# Patient Record
Sex: MALE | Race: WHITE | Employment: OTHER | URBAN - NONMETROPOLITAN AREA
[De-identification: names, ages, dates, MRNs, and addresses within clinical notes are randomized per-mention and may not be internally consistent; named-entity substitution may affect disease eponyms.]

---

## 2020-10-12 ENCOUNTER — HOSPITAL ENCOUNTER (OUTPATIENT)
Dept: MRI IMAGING | Age: 50
Discharge: HOME | End: 2020-10-12

## 2020-10-12 ENCOUNTER — HOSPITAL ENCOUNTER (OUTPATIENT)
Dept: GENERAL RADIOLOGY | Age: 50
Discharge: HOME | End: 2020-10-12

## 2020-10-19 ENCOUNTER — OFFICE VISIT (OUTPATIENT)
Dept: PHYSICAL MEDICINE AND REHAB | Age: 50
End: 2020-10-19
Payer: MEDICAID

## 2020-10-19 VITALS
WEIGHT: 169.5 LBS | SYSTOLIC BLOOD PRESSURE: 130 MMHG | BODY MASS INDEX: 23.73 KG/M2 | DIASTOLIC BLOOD PRESSURE: 78 MMHG | HEIGHT: 71 IN

## 2020-10-19 PROCEDURE — 99244 OFF/OP CNSLTJ NEW/EST MOD 40: CPT | Performed by: NURSE PRACTITIONER

## 2020-10-19 RX ORDER — BACLOFEN 10 MG/1
10 TABLET ORAL 3 TIMES DAILY
COMMUNITY
Start: 2020-09-17 | End: 2021-10-20

## 2020-10-19 RX ORDER — ZOLPIDEM TARTRATE 5 MG/1
5 TABLET ORAL NIGHTLY
Status: ON HOLD | COMMUNITY
Start: 2020-10-06 | End: 2021-05-26

## 2020-10-19 RX ORDER — ATORVASTATIN CALCIUM 20 MG/1
TABLET, FILM COATED ORAL
Status: ON HOLD | COMMUNITY
Start: 2020-09-29 | End: 2021-03-26

## 2020-10-19 RX ORDER — ETODOLAC 400 MG/1
400 TABLET, FILM COATED ORAL 3 TIMES DAILY
COMMUNITY
Start: 2020-09-17 | End: 2020-12-10

## 2020-10-19 RX ORDER — TAMSULOSIN HYDROCHLORIDE 0.4 MG/1
0.4 CAPSULE ORAL 2 TIMES DAILY
COMMUNITY
Start: 2020-08-20 | End: 2022-05-05 | Stop reason: ALTCHOICE

## 2020-10-19 ASSESSMENT — ENCOUNTER SYMPTOMS
EYE ITCHING: 0
APNEA: 1
VOMITING: 0
CHEST TIGHTNESS: 0
NAUSEA: 0
COUGH: 0
BACK PAIN: 1
EYE REDNESS: 0
SINUS PRESSURE: 0
SHORTNESS OF BREATH: 0
RHINORRHEA: 0
ABDOMINAL PAIN: 0
CONSTIPATION: 0
COLOR CHANGE: 0
DIARRHEA: 0
SINUS PAIN: 0
EYE PAIN: 0
SORE THROAT: 0

## 2020-10-19 NOTE — PROGRESS NOTES
[facility]  [address]  Dept: [phone]  Dept Fax: [phone]: [phone]    Visit Date: 10/19/2020    Maynor Bruce is a 45 y.o. male who is referred for pain management evaluation and treatment per Dr. Jadiel Rosen. CAGE and CAGE-AID Questions   1. In the last three months, have you felt you should cut down or stop drinking or using drugs? Yes []        No [x]     2. In the last three months, has anyone annoyed you or gotten on your nerves by telling you to cut down or stop drinking or using drugs? Yes []        No [x]     3. In the last three months, have you felt guilty or bad about how much you drink or use drugs? Yes []        No [x]     4. In the last three months, have you been waking up wanting to have an alcoholic drink or use drugs? Yes []        No [x]        Opioid Risk Tool:  Clinician Form       1. Family History of Substance Abuse: Female Male    Alcohol   []1   []3    Illegal drugs   []2   [x]3    Prescription drugs     []4   []4   2. Personal History of Substance Abuse:          Alcohol   []3   []3    Illegal drugs   []4   [x]4    Prescription drugs     []5   []5   3. Age (haley box if between 12 and 39):     []1   []1   4. History of Preadolescent Sexual Abuse:     []3   []0   5. Psychological Disease:      Attention deficit disorder, obsessive-compulsive disorder, bipolar, schizophrenia   []2   []2      Depression, Intermittent explosive disorder     []1   [x]1    Scoring Totals  8     Total Score  Low Risk  Moderate Risk  High Risk   Risk Category   0 - 3   4 - 7   8 or Above      Patient states symptoms interfere with:  A.  General Activity:  yes   B. Mood: yes    C. Walking Ability:   yes   D. Normal Work (Includes both work outside the home and housework):   yes    E.  Relations with Other People:  yes   F. Sleep:   yes   G.  Enjoyment of Life:  yes HPI:     ChiefComplaint: Low back pain, Right leg pain and Left leg pain    HPI    Patient here today for new patient evaluation for low back and leg pain. Patient is ACTIVE with Dr Consuelo Gross, pain management, in [address]. Patient with one visit to PT, 9/15/2020, which made his pain worse. Patient currently on baclofen and etodolac from Dr Consuelo Gross. Xr and MRI reviewed from [facility]. Patient is to have steroid injection tomorrow but is unsure what it is. Patient doesn't feel that he is being listened to as he has had injections in the past and they haven't worked. Patient has seen Dr Mariana Palencia, neurosurgery at [facility], and is set up for some testing for his back next week. Patient with previous pain management with a Dr Sreekanth Crisostomo in [place], [place] with procedures at [facility]. Will request these records also. Patient with previous CVA in 2003, patient states memory issues were progressively worse after the CVA, but did do drugs when he was younger and unsure if that may contribute also. Patient presents for evaluation of low back and leg pain. Symptoms have been present for 30 years. Patient reports inury at 22years old with a fork lift bruce to the back. Patient was incarcerated at the time and on work release and was not taken to receive care. Patient describes symptoms as pain in low back and posterior legs (aching, sharp, shooting and bilateral leg cramping in character; 9/10 in severity). Pain at best is 3-4 out of 10. Symptoms are worst: activity. Alleviating factors identifiable by patient are changing positions. Exacerbating factors identifiable by patient are bending forwards, standing, walking and one position for too long. Patient states that that th feeling he gets in the low back is like when you touch a battery to your tongue. Saw Dr Anastasia Turk maybe 30 years ago for his back.   Treatments so far initiated by patient include injections, TENS, baclofen, menthol cream, Physical therapy, ice, heat, home exercises, NSAIDs and tylenol. Treatments that were helpful were short term releiv with : Medical marijuana, heat, NSAIDs and tylenol. Last THC use was a couple weeks ago. EXAM: MRI LUMBAR SPINE WITHOUT CONTRAST    HISTORY: Back pain, > 6wks conservative tx, persistent-progressive sx, surgical candidate; ;COMMENT: History of annular tear on previous MRI. COMPARISON: Lumbar spine x-ray from 6/5/2020. No previous MRIs available. .    TECHNIQUE: MRI of the lumbar spine without contrast was performed. FINDINGS:    Number of non-rib bearing lumbar vertebral bodies: 5. Alignment: Normal lordosis. Soft tissues/paraspinal muscles: No acute abnormalities. Conus/cauda equina: Normal conus. No clumping of cauda equina nerve roots. Vertebrae/bones: No fractures. Degenerative changes:  L1-L2, L2-L3 and L3-L4: Normal disc and facets. No spinal canal or foraminal stenosis. L4-L5: Small symmetric 2 mm AP dimension disc bulge and mild facet arthropathy with trace facet effusions. No spinal canal stenosis. Mild bilateral foraminal stenosis. L5-S1: Mild disc degeneration and small 3 mm AP dimension asymmetric right disc bulge. The disc contacts the bilateral traversing S1 nerves, slightly more than the right than the left where there may be slight compression of the traversing right S1   nerve. Mild facet arthropathy. No foraminal stenosis. IMPRESSION  IMPRESSION:  1. Mild degenerative changes in the lower lumbar spine. 2. An asymmetric right disc bulge at L5-S1 contacts the bilateral traversing S1 nerves, more so on the right than the left. EMG with Dr Iona Rose 2/11/2020  EMG of bilateral lower extremities and nerve conduction studies of 6 nerves were performed. ICD-10-CM   1. Chronic bilateral low back pain with bilateral sciatica  Please refer to the EMG/NCS report for details of this study.    The study of bilateral lower extremities was normal. There was no evidence of focal neuropathy, radiculopathy or myopathy. There were prominent pain behaviors. Mechanical pain would be high in the differential.         The patient has No Known Allergies. PastMedical History  Karina Manriquez  has no past medical history on file. Past Surgical History  The patient  has no past surgical history on file. Family History  This patient's family history is not on file. Social History  Karina Manriquez  reports that he has been smoking. He has never used smokeless tobacco. He reports current alcohol use. Medications    Current Outpatient Medications:     baclofen (LIORESAL) 10 MG tablet, Take 10 mg by mouth 3 times daily, Disp: , Rfl:     etodolac (LODINE) 400 MG tablet, Take 400 mg by mouth 3 times daily, Disp: , Rfl:     tamsulosin (FLOMAX) 0.4 MG capsule, Take 0.4 mg by mouth daily, Disp: , Rfl:     zolpidem (AMBIEN) 5 MG tablet, Take 5 mg by mouth once., Disp: , Rfl:     atorvastatin (LIPITOR) 20 MG tablet, , Disp: , Rfl:     Subjective:      Review of Systems   Constitutional: Positive for activity change and fatigue. Negative for chills, diaphoresis and fever. HENT: Negative for congestion, ear discharge, ear pain, mouth sores, nosebleeds, postnasal drip, rhinorrhea, sinus pressure, sinus pain and sore throat. Eyes: Negative for pain, redness and itching. Respiratory: Positive for apnea (getting work up). Negative for cough, chest tightness and shortness of breath. Cardiovascular: Positive for chest pain. Negative for palpitations and leg swelling. Gastrointestinal: Negative for abdominal pain, constipation, diarrhea, nausea and vomiting. Endocrine: Negative for cold intolerance and heat intolerance. Genitourinary: Positive for difficulty urinating and urgency. Negative for frequency. Musculoskeletal: Positive for arthralgias, back pain and myalgias. Negative for gait problem, neck pain and neck stiffness.    Skin: Positive for wound (small scab on right forearm). Negative for color change and rash. Allergic/Immunologic: Negative for environmental allergies and food allergies. Neurological: Positive for seizures (was told in grade school ), light-headedness and numbness. Negative for dizziness and headaches. Hematological: Does not bruise/bleed easily. Psychiatric/Behavioral: Positive for sleep disturbance. The patient is nervous/anxious. Objective:     Vitals:    10/19/20 1330   BP: 130/78   Weight: 169 lb 8 oz (76.9 kg)   Height: 5' 11\" (1.803 m)       Physical Exam  Vitals signs reviewed. Constitutional:       General: He is not in acute distress. Appearance: He is well-developed. He is not diaphoretic. HENT:      Head: Normocephalic and atraumatic. Not macrocephalic and not microcephalic. Right Ear: External ear normal.      Left Ear: External ear normal.   Eyes:      General:         Right eye: No discharge. Left eye: No discharge. Conjunctiva/sclera: Conjunctivae normal.   Neck:      Trachea: No tracheal deviation. Pulmonary:      Effort: Pulmonary effort is normal. No respiratory distress. Musculoskeletal:         General: Tenderness present. Lumbar back: He exhibits decreased range of motion, tenderness, pain and spasm. Back:         Legs:    Skin:     General: Skin is warm and dry. Coloration: Skin is not pale. Findings: No rash. Neurological:      Mental Status: He is alert and oriented to person, place, and time. Cranial Nerves: No cranial nerve deficit. Psychiatric:         Attention and Perception: He is attentive. Speech: Speech normal.         Behavior: Behavior normal.         Thought Content: Thought content normal.         Judgment: Judgment normal.          Assessment:     1. Facet arthropathy of spine    2. Lumbar foraminal stenosis    3. Chronic pain syndrome    4. Spasm of muscle    5. Lumbar pain    6.  Lumbar radiculopathy            Plan:      · Patient read and signed orientation and opioid agreement. · OARRS reviewed. Current MED: 0  · Patient was not offered naloxone for home. · Discussed long term side effects of medications, tolerance, dependency and addiction. · UDS preformed today. likely THC a couple weeks ago  · Patient told can not receive any pain medications from any other source. · No evidence of abuse, diversion or aberrant behavior. · Prescription Needs: No prescription pain medications at this time   Medications and/or procedures to improve function and quality of life- patient understanding with this and that may not be pain free   Discussed possible weaning of medication dosing dependent on treatment/procedure results.  Discussed with patient about safe storage of medications at home   Testing: none - Dr Bronwyn Wallace myelogram next week   Procedures: await outcome with Dr Elisa Issa tomorrow   Discussed Facet JESS ROMERO TFESI    Will request records from [place] and Dr Coco Hamilton Discussed with patient about risks with procedure including infection, reaction to medication, increased pain, or bleeding.  Medications: none at this time. Patient to decide whether he wishes to follow thorough with our office. Discussed with patient about trial gabapentin in future   No narcotics - THC use, history drug uses when younger. Previous Treatments tried:  · PT: Yes,  any benefit? No, how many weeks? 2+, last date done: 2020  · NSAIDs: Yes,  any benefit? No  · Chiropractic: No  · Muscle relaxants: Yes,  any benefit? No  · Narcotics: No  · Spine surgeon consult: Yes  · Any Implants: No    Meds. Prescribed:   No orders of the defined types were placed in this encounter. Return if would like to proceed with our office. Time spent with patient was 60 minutes more than 50% was spent  Counseling/coordinated the patient'scare.     Electronically signed by ANGELINA Sorensen CNP on 10/19/2020 at 2:51 PM

## 2020-10-19 NOTE — PATIENT INSTRUCTIONS
 Testing: none - Dr Arvind Irvin CT myelogram next week - request these once completed   Procedures: await outcome with Dr Lefty Davalos Will request records from [place] and Dr Bi Toure Medications: none at this time. Patient to decide whether he wishes to follow thorough with our office. Discussed with patient about trial gabapentin in future   No narcotics - THC use, history drug uses when younger.

## 2020-10-28 RX ORDER — SODIUM CHLORIDE 450 MG/100ML
INJECTION, SOLUTION INTRAVENOUS CONTINUOUS
Status: CANCELLED | OUTPATIENT
Start: 2020-10-28

## 2020-10-29 ENCOUNTER — HOSPITAL ENCOUNTER (OUTPATIENT)
Dept: CT IMAGING | Age: 50
Discharge: HOME | End: 2020-10-29
Payer: MEDICAID

## 2020-10-29 ENCOUNTER — HOSPITAL ENCOUNTER (OUTPATIENT)
Dept: INTERVENTIONAL RADIOLOGY/VASCULAR | Age: 50
Discharge: HOME | End: 2020-10-29
Payer: MEDICAID

## 2020-10-29 VITALS
DIASTOLIC BLOOD PRESSURE: 86 MMHG | BODY MASS INDEX: 23.32 KG/M2 | SYSTOLIC BLOOD PRESSURE: 150 MMHG | OXYGEN SATURATION: 99 % | HEART RATE: 68 BPM | WEIGHT: 167.2 LBS | RESPIRATION RATE: 18 BRPM | TEMPERATURE: 98.7 F

## 2020-10-29 LAB — INR BLD: 1.03 (ref 0.85–1.13)

## 2020-10-29 PROCEDURE — 6360000004 HC RX CONTRAST MEDICATION: Performed by: RADIOLOGY

## 2020-10-29 PROCEDURE — 36415 COLL VENOUS BLD VENIPUNCTURE: CPT

## 2020-10-29 PROCEDURE — 2500000003 HC RX 250 WO HCPCS

## 2020-10-29 PROCEDURE — 2709999900 HC NON-CHARGEABLE SUPPLY

## 2020-10-29 PROCEDURE — 6370000000 HC RX 637 (ALT 250 FOR IP)

## 2020-10-29 PROCEDURE — 85610 PROTHROMBIN TIME: CPT

## 2020-10-29 PROCEDURE — 6360000002 HC RX W HCPCS

## 2020-10-29 PROCEDURE — 2580000003 HC RX 258: Performed by: RADIOLOGY

## 2020-10-29 PROCEDURE — 62290 NJX PX DISCOGRAPHY LUMBAR: CPT | Performed by: RADIOLOGY

## 2020-10-29 PROCEDURE — 72132 CT LUMBAR SPINE W/DYE: CPT

## 2020-10-29 PROCEDURE — 72295 X-RAY OF LOWER SPINE DISK: CPT | Performed by: RADIOLOGY

## 2020-10-29 RX ORDER — BACITRACIN, NEOMYCIN, POLYMYXIN B 400; 3.5; 5 [USP'U]/G; MG/G; [USP'U]/G
OINTMENT TOPICAL ONCE
Status: COMPLETED | OUTPATIENT
Start: 2020-10-29 | End: 2020-10-29

## 2020-10-29 RX ORDER — SODIUM CHLORIDE 450 MG/100ML
INJECTION, SOLUTION INTRAVENOUS CONTINUOUS
Status: DISCONTINUED | OUTPATIENT
Start: 2020-10-29 | End: 2020-10-30 | Stop reason: HOSPADM

## 2020-10-29 RX ORDER — FENTANYL CITRATE 50 UG/ML
50 INJECTION, SOLUTION INTRAMUSCULAR; INTRAVENOUS ONCE
Status: COMPLETED | OUTPATIENT
Start: 2020-10-29 | End: 2020-10-29

## 2020-10-29 RX ORDER — ONDANSETRON 2 MG/ML
4 INJECTION INTRAMUSCULAR; INTRAVENOUS EVERY 6 HOURS PRN
Status: DISCONTINUED | OUTPATIENT
Start: 2020-10-29 | End: 2020-10-30 | Stop reason: HOSPADM

## 2020-10-29 RX ADMIN — FENTANYL CITRATE 50 MCG: 50 INJECTION, SOLUTION INTRAMUSCULAR; INTRAVENOUS at 08:41

## 2020-10-29 RX ADMIN — IOHEXOL 4 ML: 180 INJECTION INTRAVENOUS at 08:40

## 2020-10-29 RX ADMIN — SODIUM CHLORIDE: 4.5 INJECTION, SOLUTION INTRAVENOUS at 07:08

## 2020-10-29 RX ADMIN — BACITRACIN, NEOMYCIN, POLYMYXIN B 0.9 G: 400; 3.5; 5 OINTMENT TOPICAL at 08:44

## 2020-10-29 ASSESSMENT — PAIN DESCRIPTION - LOCATION
LOCATION: BACK

## 2020-10-29 ASSESSMENT — PAIN SCALES - GENERAL
PAINLEVEL_OUTOF10: 8

## 2020-10-29 ASSESSMENT — PAIN DESCRIPTION - DIRECTION
RADIATING_TOWARDS: LEFT LEG

## 2020-10-29 ASSESSMENT — PAIN DESCRIPTION - DESCRIPTORS: DESCRIPTORS: CRAMPING

## 2020-10-29 ASSESSMENT — PAIN - FUNCTIONAL ASSESSMENT: PAIN_FUNCTIONAL_ASSESSMENT: 0-10

## 2020-10-29 ASSESSMENT — PAIN DESCRIPTION - ORIENTATION
ORIENTATION: LEFT;LOWER
ORIENTATION: LEFT;LOWER
ORIENTATION: LOWER;LEFT

## 2020-10-29 ASSESSMENT — PAIN DESCRIPTION - PAIN TYPE: TYPE: CHRONIC PAIN

## 2020-10-29 NOTE — PROGRESS NOTES
[address] ADMITTED FOR DISCOGRAM PROCEDURE REVIEWED WITH PT VERBALIZED UNDERSTANDING. Pt rights and responsibilities offered to pt to read. PT TAKES NO BLOOD THINNERS. HAND GRASP STRONG AND EQUAL BILATERALLY.  PEDAL PUSH AND PULL STRONG AND EQUAL BILATERALLY.    __M__ Safety:       (Environmental)   [place] to environment   Ensure ID band is correct and in place/ allergy band as needed   Assess for fall risk   Initiate fall precautions as applicable (fall band, side rails, etc.)   Call light within reach   Bed in low position/ wheels locked    __M__ Pain:        Assess pain level and characteristics   Administer analgesics as ordered   Assess effectiveness of pain management and report to MD as needed    _M___ Knowledge Deficit:   Assess baseline knowledge   Provide teaching at level of understanding   Provide teaching via preferred learning method   Evaluate teaching effectiveness    __M__ Hemodynamic/Respiratory Status:       (Pre and Post Procedure Monitoring)   Assess/Monitor vital signs and LOC   Assess Baseline SpO2 prior to any sedation   Obtain weight/height   Assess vital signs/ LOC until patient meets discharge criteria   Monitor procedure site and notify MD of any issues    ___

## 2020-10-29 NOTE — H&P
Formulation and discussion of sedation / procedure plans, risks, benefits, side effects and alternatives with patient and/or responsible adult completed.     Electronically signed by Bhargavi Case MD on 10/29/2020 at 8:43 AM

## 2020-10-29 NOTE — OP NOTE
Department of Radiology  Post Procedure Progress Note      Pre-Procedure Diagnosis:  Back pain    Procedure Performed:  discogram    Anesthesia: local / fentanyl post procedure    Findings: positive at L5-S1     Immediate Complications:  None    Estimated Blood Loss: minimal    SEE DICTATED PROCEDURE NOTE FOR COMPLETE DETAILS.     Bhargavi Case MD   10/29/2020 8:44 AM

## 2020-10-29 NOTE — PROGRESS NOTES
0930 NO CHANGE IN PAIN.  0945 NO CHANGE IN PAIN.   1000 INJECTION SITE SOFT AND DRY. PAIN REMAINS AT 8. GAIT STEADY WHEN UP. HOME INSTRUCTIONS TO PT VERBALIZED UNDERSTANDING. 0440 Blake Hernandez WITH FAMILY.

## 2020-10-29 NOTE — PROGRESS NOTES
0759 Pt in specials radiology for discogram. Explained procedure to pt and pt verbalizes understanding. Consent signed. 0323 Dr Jake Aguiar to speak to pt.  0840 Discogram complete. Pt tolerated well. 0844 Pt positioned on cart for comfort. Triple antibiotic ointment applied to sites with band-aids. Sites without redness, swelling or hematoma. 0850 Pt transferred to CT per cart. Report called [place].

## 2020-10-29 NOTE — H&P
[address] [address]  Sedation/Analgesia History & Physical    Pt Name: Frida Chew  MRN: [id number]  YOB: 1970  Provider Performing Procedure: Montez Saunders MD  Primary Care Physician: ANGELINA Gupta NP    PRE-PROCEDURE   DNR-CCA/DNR-CC []Yes [x]No  Brief History/Pre-Procedure Diagnosis: back pain          MEDICAL HISTORY  []CAD/Valve  []Liver Disease  []Lung Disease []Diabetes  []Hypertension []Renal Disease  []Additional information:       has a past medical history of Cerebral artery occlusion with cerebral infarction [facility]), DDD (degenerative disc disease), lumbar, Hyperlipidemia, Hypertension, Kidney stones, and Lumbago. SURGICAL HISTORY   has a past surgical history that includes rhinoplasty (2017); Colonoscopy; and Lithotripsy. Additional information:       ALLERGIES   Allergies as of 10/29/2020    (No Known Allergies)     Additional information:       MEDICATIONS   Coumadin Use Last 5 Days [x]No []Yes  Antiplatelet drug therapy use last 5 days  [x]No []Yes  Other anticoagulant use last 5 days  [x]No []Yes    Current Outpatient Medications:     baclofen (LIORESAL) 10 MG tablet, Take 10 mg by mouth 3 times daily, Disp: , Rfl:     etodolac (LODINE) 400 MG tablet, Take 400 mg by mouth 3 times daily, Disp: , Rfl:     tamsulosin (FLOMAX) 0.4 MG capsule, Take 0.4 mg by mouth daily, Disp: , Rfl:     atorvastatin (LIPITOR) 20 MG tablet, , Disp: , Rfl:     zolpidem (AMBIEN) 5 MG tablet, Take 5 mg by mouth once., Disp: , Rfl:     Current Facility-Administered Medications:     0.45 % sodium chloride infusion, , Intravenous, Continuous, Juanjose Dukes MD, Last Rate: 20 mL/hr at 10/29/20 0708    iohexol (OMNIPAQUE 180) injection 20 mL, 20 mL, Other, Once, Juanjose Dukes MD    fentaNYL (SUBLIMAZE) injection 50 mcg, 50 mcg, Intravenous, Once, Juanjose Dukes MD  Prior to Admission medications    Medication Sig Start Date End Date Taking?  Authorizing Provider   baclofen (LIORESAL) 10 responsible adult completed. [x]Patient examined immediately prior to the procedure.  (Refer to nursing sedation/analgesia documentation record)    Hermelinda Lindquist MD  Electronically signed 10/29/2020 at 8:43 AM

## 2020-12-10 ENCOUNTER — OFFICE VISIT (OUTPATIENT)
Dept: PHYSICAL MEDICINE AND REHAB | Age: 50
End: 2020-12-10
Payer: MEDICAID

## 2020-12-10 VITALS
SYSTOLIC BLOOD PRESSURE: 138 MMHG | TEMPERATURE: 97.6 F | BODY MASS INDEX: 23.38 KG/M2 | HEIGHT: 71 IN | DIASTOLIC BLOOD PRESSURE: 78 MMHG | WEIGHT: 167 LBS

## 2020-12-10 PROCEDURE — G8427 DOCREV CUR MEDS BY ELIG CLIN: HCPCS | Performed by: NURSE PRACTITIONER

## 2020-12-10 PROCEDURE — G8484 FLU IMMUNIZE NO ADMIN: HCPCS | Performed by: NURSE PRACTITIONER

## 2020-12-10 PROCEDURE — 99214 OFFICE O/P EST MOD 30 MIN: CPT | Performed by: NURSE PRACTITIONER

## 2020-12-10 PROCEDURE — 4004F PT TOBACCO SCREEN RCVD TLK: CPT | Performed by: NURSE PRACTITIONER

## 2020-12-10 PROCEDURE — 3017F COLORECTAL CA SCREEN DOC REV: CPT | Performed by: NURSE PRACTITIONER

## 2020-12-10 PROCEDURE — G8420 CALC BMI NORM PARAMETERS: HCPCS | Performed by: NURSE PRACTITIONER

## 2020-12-10 RX ORDER — ETODOLAC 400 MG/1
400 TABLET, FILM COATED ORAL 3 TIMES DAILY
Qty: 90 TABLET | Refills: 2 | Status: SHIPPED | OUTPATIENT
Start: 2020-12-10 | End: 2021-03-15

## 2020-12-10 ASSESSMENT — ENCOUNTER SYMPTOMS
COUGH: 0
SHORTNESS OF BREATH: 0
SORE THROAT: 0
CONSTIPATION: 0
SINUS PRESSURE: 0
ABDOMINAL PAIN: 0
EYE ITCHING: 0
EYE REDNESS: 0
DIARRHEA: 0
VOMITING: 0
BACK PAIN: 1
EYE PAIN: 0
RHINORRHEA: 0
APNEA: 1
NAUSEA: 0
COLOR CHANGE: 0
SINUS PAIN: 0
CHEST TIGHTNESS: 0

## 2020-12-10 NOTE — PROGRESS NOTES
[address] [facility]  [address]  Dept: [phone]  Dept Fax: [phone]: [phone]    Visit Date: 12/10/2020    Functionality Assessment/Goals Worksheet     On a scale of 0 (Does not Interfere) to 10 (Completely Interferes)     1. Which number describes how during the past week pain has interfered with       the following:  A. General Activity:  9  B. Mood: 8  C. Walking Ability:  10  D. Normal Work (Includes both work outside the home and housework):  10  E. Relations with Other People:   8  F. Sleep:   9  G. Enjoyment of Life:   9    2. Patient Prefers to Take their Pain Medications:     []  On a regular basis   [x]  Only when necessary    []  Does not take pain medications    3. What are the Patient's Goals/Expectations for Visiting Pain Management? [x]  Learn about my pain    []  Receive Medication   []  Physical Therapy     []  Treat Depression   []  Receive Injections    []  Treat Sleep   [x]  Deal with Anxiety and Stress   []  Treat Opoid Dependence/Addiction   [x]  Other:  Options other than surgery      HPI:   Amaury Brar is a 45 y.o. male is here today for    Chief Complaint: Low back pain, Right leg pain and Left leg pain    HPI     Patient here for follow up. Patient follows with Dr Claire Valladares in [address] [place] [address] [address]. Also sees Dr Teresa Martinez, surgeon. Patient with TFNB bilateral S1 with Dr THEODORE without good results. Previous PT unsuccessful. Patient here today for second opinion for other options than surgery. Patient with follow up coming up with Dr Teresa Martinez. Reviewed CT lumbar spine today. Patient with ongoing low back pain with radicular symptoms.  Discussed LESI at L5      PROCEDURE: CT LUMBAR SPINE W CONTRAST         CLINICAL INFORMATION: disc degeneration lumbar.         COMPARISON: MRI lumbar spine dated 7/29/2020.         TECHNIQUE: Helical CT of the lumbar spine following discography at the L3-4 through L5-S1 levels with sagittal and coronal reconstructions. Angled images were reconstructed through the L3-4, L4-5 and L5-S1 disc levels.         All CT scans at this facility use dose modulation, iterative reconstruction, and/or weight-based dosing when appropriate to reduce radiation dose to as low as reasonably achievable.         FINDINGS:         There is anatomic vertebral body height and alignment. No fracture is evident. There is disc space narrowing at the L5-S1 level, similar to prior MRI. There is contrast material within the central aspects of the L3-4 through L5-S1 intervertebral discs    from preceding discography. There is communication of contrast in the nucleus pulposus at L5-S1  with the posterior annulus fibrosis over a broad posterior area as evidence for an annular fissure. There is also small communication between the nucleus    pulposus and annulus fibrosis at the left neural foramen at L4-5. There is small amount of contrast in the epidural space of L3-4 and L4-5 likely from spillage of contrast from the discogram. Paraspinal soft tissues are unremarkable. At T12-L1 through L2-3 there is no significant spinal canal or neuroforaminal stenosis. At L3-4 there is no significant spinal canal stenosis. There is mild bilateral neuroforaminal stenosis in association with mild facet hypertrophy and ligamentum flavum thickening. At L4-5 there is a minimal disc bulge without significant spinal canal stenosis and mild bilateral neural foraminal stenosis in association with facet hypertrophy and ligamentum flavum thickening.     At L5-S1 there is a broad-based protrusion which contributes to mild spinal canal stenosis and mild bilateral neural foraminal stenosis in association with facet hypertrophy and ligament flavum thickening.                   Impression         Post discogram CT of the lumbar spine demonstrating small left foraminal annular fissure at L4-5 and large posterior annular fissure at L5-S1. Medications reviewed. UDS + THC and previous hx of drug use      The patienthas No Known Allergies. Past Medical History  [address]  has a past medical history of Cerebral artery occlusion with cerebral infarction ([address] DDD (degenerative disc disease), lumbar, Hyperlipidemia, Hypertension, Kidney stones, and Lumbago. Past Surgical History  The patient  has a past surgical history that includes rhinoplasty (2017); Colonoscopy; and Lithotripsy. Family History  This patient's family history is not on file. Social History  [address]  reports that he has been smoking. He has never used smokeless tobacco. He reports current alcohol use. Medications    Current Outpatient Medications:     etodolac (LODINE) 400 MG tablet, Take 1 tablet by mouth 3 times daily, Disp: 90 tablet, Rfl: 2    atorvastatin (LIPITOR) 20 MG tablet, , Disp: , Rfl:     baclofen (LIORESAL) 10 MG tablet, Take 10 mg by mouth 3 times daily, Disp: , Rfl:     tamsulosin (FLOMAX) 0.4 MG capsule, Take 0.4 mg by mouth daily, Disp: , Rfl:     zolpidem (AMBIEN) 5 MG tablet, Take 5 mg by mouth once., Disp: , Rfl:     Subjective:      Review of Systems   Constitutional: Positive for activity change and fatigue. Negative for chills, diaphoresis and fever. HENT: Negative for congestion, ear discharge, ear pain, mouth sores, nosebleeds, postnasal drip, rhinorrhea, sinus pressure, sinus pain and sore throat. Eyes: Negative for pain, redness and itching. Respiratory: Positive for apnea (getting work up). Negative for cough, chest tightness and shortness of breath. Cardiovascular: Positive for chest pain. Negative for palpitations and leg swelling. Gastrointestinal: Negative for abdominal pain, constipation, diarrhea, nausea and vomiting. Endocrine: Negative for cold intolerance and heat intolerance. Genitourinary: Positive for difficulty urinating and urgency. Negative for frequency. Musculoskeletal: Positive for arthralgias, back pain and myalgias. Negative for gait problem, neck pain and neck stiffness. Skin: Positive for wound (small scab on right forearm). Negative for color change and rash. Allergic/Immunologic: Negative for environmental allergies and food allergies. Neurological: Positive for seizures (was told in grade school ), light-headedness and numbness. Negative for dizziness and headaches. Hematological: Does not bruise/bleed easily. Psychiatric/Behavioral: Positive for sleep disturbance. The patient is nervous/anxious. Objective:     Vitals:    12/10/20 1009   BP: 138/78   Temp: 97.6 °F (36.4 °C)   Weight: 167 lb (75.8 kg)   Height: 5' 11\" (1.803 m)       Physical Exam  Vitals signs reviewed. Constitutional:       General: He is not in acute distress. Appearance: He is well-developed. He is not diaphoretic. HENT:      Head: Normocephalic and atraumatic. Not macrocephalic and not microcephalic. Right Ear: External ear normal.      Left Ear: External ear normal.   Eyes:      General:         Right eye: No discharge. Left eye: No discharge. Conjunctiva/sclera: Conjunctivae normal.   Neck:      Trachea: No tracheal deviation. Pulmonary:      Effort: Pulmonary effort is normal. No respiratory distress. Musculoskeletal:         General: Tenderness present. Lumbar back: He exhibits decreased range of motion, tenderness, pain and spasm. Back:         Legs:    Skin:     General: Skin is warm and dry. Coloration: Skin is not pale. Findings: No rash. Neurological:      Mental Status: He is alert and oriented to person, place, and time. Cranial Nerves: No cranial nerve deficit. Psychiatric:         Attention and Perception: He is attentive. Speech: Speech normal.         Behavior: Behavior normal.         Thought Content:  Thought content normal.         Judgment: Judgment normal.            Assessment: 1. Annular tear of lumbar disc    2. Lumbar stenosis with neurogenic claudication    3. Lumbar foraminal stenosis    4. Lumbar radiculopathy    5. Lumbar pain    6. Facet arthropathy of spine    7. Chronic pain syndrome    8. Spasm of muscle            Plan:      · OARRS reviewed. Current MED: 0  · Patient was not offered naloxone for home. · Discussed long term side effects of medications, tolerance, dependency and addiction. · Previous UDS reviewed  · Patient told can not receive any pain medications from any other source. · No evidence of abuse, diversion or aberrant behavior.  Medications and/or procedures to improve function and quality of life- patient understanding with this and that may not be pain free   Discussed with patient about safe storage of medications at home   Discussed possible weaning of medication dosing dependent on treatment/procedure results.  Testing: none    Procedures: LESI L5 #1   Discussed with patient about risks with procedure including infection, reaction to medication, increased pain, or bleeding.  Medications: etodolac 400 mg TID PRN - goal to decrease after LESI       Meds. Prescribed:   Orders Placed This Encounter   Medications    etodolac (LODINE) 400 MG tablet     Sig: Take 1 tablet by mouth 3 times daily     Dispense:  90 tablet     Refill:  2       Return for LESI L5 #1, follow up after procedure with [facility].            Electronically signed by ANGELINA Chapman CNP on12/10/2020 at 10:42 AM

## 2020-12-10 NOTE — PATIENT INSTRUCTIONS
· Testing: none   · Procedures: LESI L5 #1  · Discussed with patient about risks with procedure including infection, reaction to medication, increased pain, or bleeding.   · Medications: etodolac 400 mg TID PRN - goal to decrease after LESI

## 2021-01-05 ENCOUNTER — APPOINTMENT (OUTPATIENT)
Dept: GENERAL RADIOLOGY | Age: 51
End: 2021-01-05
Attending: PAIN MEDICINE
Payer: MEDICAID

## 2021-01-05 ENCOUNTER — HOSPITAL ENCOUNTER (OUTPATIENT)
Age: 51
Setting detail: OUTPATIENT SURGERY
Discharge: HOME | End: 2021-01-05
Attending: PAIN MEDICINE | Admitting: PAIN MEDICINE
Payer: MEDICAID

## 2021-01-05 ENCOUNTER — ANESTHESIA (OUTPATIENT)
Dept: OPERATING ROOM | Age: 51
End: 2021-01-05
Payer: MEDICAID

## 2021-01-05 ENCOUNTER — ANESTHESIA EVENT (OUTPATIENT)
Dept: OPERATING ROOM | Age: 51
End: 2021-01-05
Payer: MEDICAID

## 2021-01-05 VITALS
BODY MASS INDEX: 24.92 KG/M2 | OXYGEN SATURATION: 96 % | DIASTOLIC BLOOD PRESSURE: 58 MMHG | SYSTOLIC BLOOD PRESSURE: 99 MMHG | HEART RATE: 60 BPM | HEIGHT: 71 IN | RESPIRATION RATE: 16 BRPM | WEIGHT: 178 LBS | TEMPERATURE: 96.9 F

## 2021-01-05 VITALS
SYSTOLIC BLOOD PRESSURE: 121 MMHG | OXYGEN SATURATION: 99 % | DIASTOLIC BLOOD PRESSURE: 77 MMHG | RESPIRATION RATE: 18 BRPM

## 2021-01-05 PROCEDURE — 6360000002 HC RX W HCPCS: Performed by: PAIN MEDICINE

## 2021-01-05 PROCEDURE — 7100000011 HC PHASE II RECOVERY - ADDTL 15 MIN: Performed by: PAIN MEDICINE

## 2021-01-05 PROCEDURE — 3209999900 FLUORO FOR SURGICAL PROCEDURES

## 2021-01-05 PROCEDURE — 3700000000 HC ANESTHESIA ATTENDED CARE: Performed by: PAIN MEDICINE

## 2021-01-05 PROCEDURE — 62323 NJX INTERLAMINAR LMBR/SAC: CPT | Performed by: PAIN MEDICINE

## 2021-01-05 PROCEDURE — 3600000054 HC PAIN LEVEL 3 BASE: Performed by: PAIN MEDICINE

## 2021-01-05 PROCEDURE — 6360000004 HC RX CONTRAST MEDICATION: Performed by: PAIN MEDICINE

## 2021-01-05 PROCEDURE — 6360000002 HC RX W HCPCS: Performed by: NURSE ANESTHETIST, CERTIFIED REGISTERED

## 2021-01-05 PROCEDURE — 7100000010 HC PHASE II RECOVERY - FIRST 15 MIN: Performed by: PAIN MEDICINE

## 2021-01-05 PROCEDURE — 2580000003 HC RX 258: Performed by: PAIN MEDICINE

## 2021-01-05 PROCEDURE — 2500000003 HC RX 250 WO HCPCS: Performed by: PAIN MEDICINE

## 2021-01-05 RX ORDER — LIDOCAINE HYDROCHLORIDE 10 MG/ML
INJECTION, SOLUTION INFILTRATION; PERINEURAL PRN
Status: DISCONTINUED | OUTPATIENT
Start: 2021-01-05 | End: 2021-01-05 | Stop reason: ALTCHOICE

## 2021-01-05 RX ORDER — DEXAMETHASONE SODIUM PHOSPHATE 4 MG/ML
INJECTION, SOLUTION INTRA-ARTICULAR; INTRALESIONAL; INTRAMUSCULAR; INTRAVENOUS; SOFT TISSUE PRN
Status: DISCONTINUED | OUTPATIENT
Start: 2021-01-05 | End: 2021-01-05 | Stop reason: ALTCHOICE

## 2021-01-05 RX ORDER — PROPOFOL 10 MG/ML
INJECTION, EMULSION INTRAVENOUS PRN
Status: DISCONTINUED | OUTPATIENT
Start: 2021-01-05 | End: 2021-01-05 | Stop reason: SDUPTHER

## 2021-01-05 RX ORDER — SODIUM CHLORIDE 9 MG/ML
INJECTION INTRAVENOUS PRN
Status: DISCONTINUED | OUTPATIENT
Start: 2021-01-05 | End: 2021-01-05 | Stop reason: ALTCHOICE

## 2021-01-05 RX ADMIN — PROPOFOL 50 MG: 10 INJECTION, EMULSION INTRAVENOUS at 10:56

## 2021-01-05 ASSESSMENT — PULMONARY FUNCTION TESTS
PIF_VALUE: 0

## 2021-01-05 ASSESSMENT — PAIN DESCRIPTION - DESCRIPTORS: DESCRIPTORS: OTHER (COMMENT)

## 2021-01-05 ASSESSMENT — LIFESTYLE VARIABLES: SMOKING_STATUS: 1

## 2021-01-05 ASSESSMENT — PAIN - FUNCTIONAL ASSESSMENT: PAIN_FUNCTIONAL_ASSESSMENT: 0-10

## 2021-01-05 NOTE — ANESTHESIA POSTPROCEDURE EVALUATION
Department of Anesthesiology  Postprocedure Note    Patient: Gus Brandt  MRN: [id number]  [place]: 1970  Date of evaluation: 1/5/2021  Time:  11:23 AM     Procedure Summary     Date: 01/05/21 Room / Location: [address] [place] [address] 03 [address] [facility]    Anesthesia Start: 1053 Anesthesia Stop: 1103    Procedure: LESI L5 #1 (N/A ) Diagnosis: (Annular tear of lumbar disc)    Surgeons: Rogelio Moncada MD Responsible Provider: Karissa Carreon MD    Anesthesia Type: MAC ASA Status: 2          Anesthesia Type: MAC    Gerard Phase I:      Gerard Phase II: Gerard Score: 9    Last vitals: Reviewed and per EMR flowsheets. Anesthesia Post Evaluation    Patient location during evaluation: PACU  Patient participation: complete - patient participated  Level of consciousness: awake and alert  Airway patency: patent  Nausea & Vomiting: no nausea and no vomiting  Complications: no  Cardiovascular status: hemodynamically stable  Respiratory status: acceptable  Hydration status: euvolemic      [address]  POST-ANESTHESIA NOTE       Name:  Gus Brandt                                         Age:  45 y.o.   MRN:  [id number]      Last Vitals:  BP (!) 99/58   Pulse 60   Temp 96.9 °F (36.1 °C) (Infrared)   Resp 16   Ht 5' 11\" (1.803 m)   Wt 178 lb (80.7 kg)   SpO2 96%   BMI 24.83 kg/m²   Patient Vitals for the past 4 hrs:   BP Temp Temp src Pulse Resp SpO2 Height Weight   01/05/21 1104 (!) 99/58   60  96 %     01/05/21 1101 94/60 96.9 °F (36.1 °C) Infrared 59 16 96 %     01/05/21 1026 129/72 97.2 °F (36.2 °C) Temporal 51 16 97 % 5' 11\" (1.803 m) 178 lb (80.7 kg)       Level of Consciousness:  Awake    Respiratory:  Stable    Oxygen Saturation:  Stable    Cardiovascular:  Stable    Hydration:  Adequate    PONV:  Stable    Post-op Pain:  Adequate analgesia    Post-op Assessment:  No apparent anesthetic complications    Additional Follow-Up / Treatment / Comment:  None Selena Mcfadden MD  January 5, 2021   11:23 AM

## 2021-01-05 NOTE — H&P
[facility]  History and Physical Update    Pt Name: Meche Salazar  MRN: [id number]  YOB: 1970  Date of evaluation: 1/5/2021      I have examined the patient and reviewed the H&P/Consult and there are no changes to the patient or plans.         Electronically signed by Sherrell Riley MD on 1/5/2021 at 10:18 AM

## 2021-01-05 NOTE — PROGRESS NOTES
1101- PT arrived to PACU phase 2, sleepy, Teagan WONG at bedside for report. Pt hooked up to monitor, VSS, pt still sleepy. 1104- Pt still sleeping, VSS.  1115- Pt waking up more couldn't believe he was done didn't want a snack or anything yet.    1123- IV removed  [id number] called  [phone] Pt discharged walked to car with Jill Mratinez RN

## 2021-01-05 NOTE — ANESTHESIA PRE PROCEDURE
Department of Anesthesiology  Preprocedure Note       Name:  Minoo Schwartz   Age:  45 y.o.  :  1970                                          MRN:  [id number]         Date:  2021      Surgeon: Estela Nation):  Demetra Sanders MD    Procedure: Procedure(s):  LESI L5 #1    Medications prior to admission:   Prior to Admission medications    Medication Sig Start Date End Date Taking? Authorizing Provider   atorvastatin (LIPITOR) 20 MG tablet  20  Yes Historical Provider, MD   baclofen (LIORESAL) 10 MG tablet Take 10 mg by mouth 3 times daily 20  Yes Historical Provider, MD   tamsulosin (FLOMAX) 0.4 MG capsule Take 0.4 mg by mouth daily 20  Yes Historical Provider, MD   zolpidem (AMBIEN) 5 MG tablet Take 5 mg by mouth once. 10/6/20  Yes Historical Provider, MD   etodolac (LODINE) 400 MG tablet Take 1 tablet by mouth 3 times daily 12/10/20 3/10/21  BarbaraANGELINA Munoz CNP   Elastic Bandages & Supports (LUMBAR BACK BRACE/SUPPORT PAD) MISC 1 each by Does not apply route daily as needed (for pain and lumbar support) 12/10/20   BarbaraANGELINA Munoz CNP       Current medications:    No current facility-administered medications for this encounter. Allergies:  No Known Allergies    Problem List:  There is no problem list on file for this patient.       Past Medical History:        Diagnosis Date    Cerebral artery occlusion with cerebral infarction ([facility] [address]     DDD (degenerative disc disease), lumbar     Hyperlipidemia     Hypertension     Kidney stones     Lumbago        Past Surgical History:        Procedure Laterality Date    COLONOSCOPY      LITHOTRIPSY      RHINOPLASTY  2017       Social History:    Social History     Tobacco Use    Smoking status: Current Every Day Smoker     Packs/day: 0.05     Types: Cigarettes    Smokeless tobacco: Never Used   Substance Use Topics    Alcohol use: Yes     Comment: occasional                                Ready to quit: Not Answered Counseling given: Not Answered      Vital Signs (Current):   Vitals:    01/05/21 1026   BP: 129/72   Pulse: 51   Resp: 16   Temp: 97.2 °F (36.2 °C)   TempSrc: Temporal   SpO2: 97%   Weight: 178 lb (80.7 kg)   Height: 5' 11\" (1.803 m)                                              BP Readings from Last 3 Encounters:   01/05/21 129/72   12/10/20 138/78   10/29/20 (!) 150/86       NPO Status: Time of last liquid consumption: 1930                        Time of last solid consumption: 1930                        Date of last liquid consumption: 01/04/21                        Date of last solid food consumption: 01/04/21    BMI:   Wt Readings from Last 3 Encounters:   01/05/21 178 lb (80.7 kg)   12/10/20 167 lb (75.8 kg)   10/29/20 167 lb 3.2 oz (75.8 kg)     Body mass index is 24.83 kg/m². CBC: No results found for: WBC, RBC, HGB, HCT, MCV, RDW, PLT    CMP: No results found for: NA, K, CL, CO2, BUN, CREATININE, GFRAA, AGRATIO, LABGLOM, GLUCOSE, PROT, CALCIUM, BILITOT, ALKPHOS, AST, ALT    POC Tests: No results for input(s): POCGLU, POCNA, POCK, POCCL, POCBUN, POCHEMO, POCHCT in the last 72 hours.     Coags:   Lab Results   Component Value Date    INR 1.03 10/29/2020       HCG (If Applicable): No results found for: PREGTESTUR, PREGSERUM, HCG, HCGQUANT     ABGs: No results found for: PHART, PO2ART, MRF8LMQ, PSX2VLX, BEART, V1OBQAKO     Type & Screen (If Applicable):  No results found for: LABABO, LABRH    Drug/Infectious Status (If Applicable):  No results found for: HIV, HEPCAB    COVID-19 Screening (If Applicable): No results found for: COVID19      Anesthesia Evaluation  Patient summary reviewed no history of anesthetic complications:   Airway: Mallampati: II  TM distance: >3 FB   Neck ROM: full  Mouth opening: > = 3 FB Dental:          Pulmonary:normal exam    (+) current smoker                           Cardiovascular:    (+) hypertension:, hyperlipidemia                  Neuro/Psych:   (+) CVA:, GI/Hepatic/Renal:   (+) renal disease: kidney stones,           Endo/Other:                     Abdominal:           Vascular:                                        Anesthesia Plan      MAC     ASA 2       Induction: intravenous. Anesthetic plan and risks discussed with patient.       Plan discussed with CRNA and surgical team.                  Natanael Russell MD   1/5/2021

## 2021-01-05 NOTE — H&P
H&P    Patient here for follow up. Patient follows with Dr Marlen Dover in [address] [place] [address] [address] [facility]. Also sees Dr Esteban Menjivar, surgeon. Patient with TFNB bilateral S1 with Dr THEODORE without good results. Previous PT unsuccessful. Patient here today for second opinion for other options than surgery. Patient with follow up coming up with Dr Esteban Menjivar.      Reviewed CT lumbar spine today. Patient with ongoing low back pain with radicular symptoms. Discussed LESI at L5        PROCEDURE: CT LUMBAR SPINE W CONTRAST         CLINICAL INFORMATION: disc degeneration lumbar.         COMPARISON: MRI lumbar spine dated 7/29/2020.         TECHNIQUE: Helical CT of the lumbar spine following discography at the L3-4 through L5-S1 levels with sagittal and coronal reconstructions. Angled images were reconstructed through the L3-4, L4-5 and L5-S1 disc levels.         All CT scans at this facility use dose modulation, iterative reconstruction, and/or weight-based dosing when appropriate to reduce radiation dose to as low as reasonably achievable.         FINDINGS:         There is anatomic vertebral body height and alignment. No fracture is evident. There is disc space narrowing at the L5-S1 level, similar to prior MRI. There is contrast material within the central aspects of the L3-4 through L5-S1 intervertebral discs    from preceding discography. There is communication of contrast in the nucleus pulposus at L5-S1  with the posterior annulus fibrosis over a broad posterior area as evidence for an annular fissure. There is also small communication between the nucleus    pulposus and annulus fibrosis at the left neural foramen at L4-5. There is small amount of contrast in the epidural space of L3-4 and L4-5 likely from spillage of contrast from the discogram. Paraspinal soft tissues are unremarkable. At T12-L1 through L2-3 there is no significant spinal canal or neuroforaminal stenosis. At L3-4 there is no significant spinal canal stenosis.  There is mild bilateral neuroforaminal stenosis in association with mild facet hypertrophy and ligamentum flavum thickening. At L4-5 there is a minimal disc bulge without significant spinal canal stenosis and mild bilateral neural foraminal stenosis in association with facet hypertrophy and ligamentum flavum thickening. At L5-S1 there is a broad-based protrusion which contributes to mild spinal canal stenosis and mild bilateral neural foraminal stenosis in association with facet hypertrophy and ligament flavum thickening.                   Impression         Post discogram CT of the lumbar spine demonstrating small left foraminal annular fissure at L4-5 and large posterior annular fissure at L5-S1.             Medications reviewed. UDS + THC and previous hx of drug use        The patienthas No Known Allergies.     Past Medical History  Jaleesa Alcazar  has a past medical history of Cerebral artery occlusion with cerebral infarction ([address] DDD (degenerative disc disease), lumbar, Hyperlipidemia, Hypertension, Kidney stones, and Lumbago.     Past Surgical History  The patient  has a past surgical history that includes rhinoplasty (2017); Colonoscopy; and Lithotripsy.     Family History  This patient's family history is not on file.     Social History  Jaleesa Alcazar  reports that he has been smoking. He has never used smokeless tobacco. He reports current alcohol use.     Medications    Current Medication      Current Outpatient Medications:     etodolac (LODINE) 400 MG tablet, Take 1 tablet by mouth 3 times daily, Disp: 90 tablet, Rfl: 2    atorvastatin (LIPITOR) 20 MG tablet, , Disp: , Rfl:     baclofen (LIORESAL) 10 MG tablet, Take 10 mg by mouth 3 times daily, Disp: , Rfl:     tamsulosin (FLOMAX) 0.4 MG capsule, Take 0.4 mg by mouth daily, Disp: , Rfl:     zolpidem (AMBIEN) 5 MG tablet, Take 5 mg by mouth once., Disp: , Rfl:         Subjective:      Review of Systems   Constitutional: Positive for activity change and fatigue. Negative for chills, diaphoresis and fever. HENT: Negative for congestion, ear discharge, ear pain, mouth sores, nosebleeds, postnasal drip, rhinorrhea, sinus pressure, sinus pain and sore throat. Eyes: Negative for pain, redness and itching. Respiratory: Positive for apnea (getting work up). Negative for cough, chest tightness and shortness of breath. Cardiovascular: Positive for chest pain. Negative for palpitations and leg swelling. Gastrointestinal: Negative for abdominal pain, constipation, diarrhea, nausea and vomiting. Endocrine: Negative for cold intolerance and heat intolerance. Genitourinary: Positive for difficulty urinating and urgency. Negative for frequency. Musculoskeletal: Positive for arthralgias, back pain and myalgias. Negative for gait problem, neck pain and neck stiffness. Skin: Positive for wound (small scab on right forearm). Negative for color change and rash. Allergic/Immunologic: Negative for environmental allergies and food allergies. Neurological: Positive for seizures (was told in grade school ), light-headedness and numbness. Negative for dizziness and headaches. Hematological: Does not bruise/bleed easily. Psychiatric/Behavioral: Positive for sleep disturbance. The patient is nervous/anxious.          Objective:      Vitals       Vitals:     12/10/20 1009   BP: 138/78   Temp: 97.6 °F (36.4 °C)   Weight: 167 lb (75.8 kg)   Height: 5' 11\" (1.803 m)            Physical Exam  Vitals signs reviewed. Constitutional:       General: He is not in acute distress. Appearance: He is well-developed. He is not diaphoretic. HENT:      Head: Normocephalic and atraumatic. Not macrocephalic and not microcephalic. Right Ear: External ear normal.      Left Ear: External ear normal.   Eyes:      General:         Right eye: No discharge. Left eye: No discharge. Conjunctiva/sclera: Conjunctivae normal.   Neck:      Trachea: No tracheal deviation. Pulmonary:      Effort: Pulmonary effort is normal. No respiratory distress. Musculoskeletal:         General: Tenderness present. Lumbar back: He exhibits decreased range of motion, tenderness, pain and spasm. Back:         Legs:    Skin:     General: Skin is warm and dry. Coloration: Skin is not pale. Findings: No rash. Neurological:      Mental Status: He is alert and oriented to person, place, and time. Cranial Nerves: No cranial nerve deficit. Psychiatric:         Attention and Perception: He is attentive. Speech: Speech normal.         Behavior: Behavior normal.         Thought Content: Thought content normal.         Judgment: Judgment normal.            Assessment:      1. Annular tear of lumbar disc    2. Lumbar stenosis with neurogenic claudication    3. Lumbar foraminal stenosis    4. Lumbar radiculopathy    5. Lumbar pain    6. Facet arthropathy of spine    7. Chronic pain syndrome    8. Spasm of muscle       Plan:      · OARRS reviewed. Current MED: 0  · Patient was not offered naloxone for home. · Discussed long term side effects of medications, tolerance, dependency and addiction. · Previous UDS reviewed  · Patient told can not receive any pain medications from any other source. · No evidence of abuse, diversion or aberrant behavior. · Medications and/or procedures to improve function and quality of life- patient understanding with this and that may not be pain free  · Discussed with patient about safe storage of medications at home  · Discussed possible weaning of medication dosing dependent on treatment/procedure results. · Testing: none   · Procedures: LESI L5 #1  · Discussed with patient about risks with procedure including infection, reaction to medication, increased pain, or bleeding. · Medications: etodolac 400 mg TID PRN - goal to decrease after LESI                 Return for LESI L5 #1, follow up after procedure with [facility]

## 2021-01-05 NOTE — PROGRESS NOTES
Resting quietly in bed with call light in reach. Updated on plan of care and discharge instructions. Voiced understanding.

## 2021-02-11 ENCOUNTER — OFFICE VISIT (OUTPATIENT)
Dept: PHYSICAL MEDICINE AND REHAB | Age: 51
End: 2021-02-11
Payer: MEDICAID

## 2021-02-11 ENCOUNTER — TELEPHONE (OUTPATIENT)
Dept: PHYSICAL MEDICINE AND REHAB | Age: 51
End: 2021-02-11

## 2021-02-11 VITALS
SYSTOLIC BLOOD PRESSURE: 122 MMHG | BODY MASS INDEX: 24.92 KG/M2 | HEIGHT: 71 IN | TEMPERATURE: 97.6 F | WEIGHT: 178 LBS | DIASTOLIC BLOOD PRESSURE: 64 MMHG

## 2021-02-11 DIAGNOSIS — M51.36 ANNULAR TEAR OF LUMBAR DISC: ICD-10-CM

## 2021-02-11 DIAGNOSIS — G89.4 CHRONIC PAIN SYNDROME: ICD-10-CM

## 2021-02-11 DIAGNOSIS — M48.062 LUMBAR STENOSIS WITH NEUROGENIC CLAUDICATION: ICD-10-CM

## 2021-02-11 DIAGNOSIS — M48.061 LUMBAR FORAMINAL STENOSIS: ICD-10-CM

## 2021-02-11 DIAGNOSIS — M47.819 FACET ARTHROPATHY OF SPINE: ICD-10-CM

## 2021-02-11 DIAGNOSIS — M54.50 LUMBAR PAIN: ICD-10-CM

## 2021-02-11 DIAGNOSIS — M54.17 LUMBOSACRAL RADICULITIS: Primary | ICD-10-CM

## 2021-02-11 PROCEDURE — 4004F PT TOBACCO SCREEN RCVD TLK: CPT | Performed by: NURSE PRACTITIONER

## 2021-02-11 PROCEDURE — 99214 OFFICE O/P EST MOD 30 MIN: CPT | Performed by: NURSE PRACTITIONER

## 2021-02-11 PROCEDURE — 3017F COLORECTAL CA SCREEN DOC REV: CPT | Performed by: NURSE PRACTITIONER

## 2021-02-11 PROCEDURE — G8427 DOCREV CUR MEDS BY ELIG CLIN: HCPCS | Performed by: NURSE PRACTITIONER

## 2021-02-11 PROCEDURE — G8420 CALC BMI NORM PARAMETERS: HCPCS | Performed by: NURSE PRACTITIONER

## 2021-02-11 PROCEDURE — G8484 FLU IMMUNIZE NO ADMIN: HCPCS | Performed by: NURSE PRACTITIONER

## 2021-02-11 RX ORDER — GABAPENTIN 300 MG/1
300 CAPSULE ORAL 3 TIMES DAILY
Qty: 90 CAPSULE | Refills: 0 | Status: SHIPPED | OUTPATIENT
Start: 2021-02-11 | End: 2021-03-29 | Stop reason: SDUPTHER

## 2021-02-11 ASSESSMENT — ENCOUNTER SYMPTOMS: BACK PAIN: 1

## 2021-02-11 NOTE — PROGRESS NOTES
[facility] [address]  Dept: [phone]  Dept Fax: [phone]: [phone]    Visit Date: 2/11/2021    Functionality Assessment/Goals Worksheet     On a scale of 0 (Does not Interfere) to 10 (Completely Interferes)     1. Which number describes how during the past week pain has interfered with       the following:  A. General Activity:  9  B. Mood: 9  C. Walking Ability:  10  D. Normal Work (Includes both work outside the home and housework):  10  E. Relations with Other People:   8  F. Sleep:   9  G. Enjoyment of Life:   10    2. Patient Prefers to Take their Pain Medications:     []  On a regular basis   [x]  Only when necessary    []  Does not take pain medications    3. What are the Patient's Goals/Expectations for Visiting Pain Management? []  Learn about my pain    []  Receive Medication   []  Physical Therapy     []  Treat Depression   []  Receive Injections    []  Treat Sleep   []  Deal with Anxiety and Stress   []  Treat Opoid Dependence/Addiction   []  Other:      HPI:   Syeda Sin is a 45 y.o. male is here today for    Chief Complaint: Low back pain, leg pain     HPI   FU from LESI # 1 from 1/5/2021. Received 75% relief of of low back an dleg pain but less than 24 hours and then pain came right back. Pain startes in lower back and radiates across and down bilateral legs posterior to toes- pain in lower back is \"electrical charge, burning, aching\" and in legs - tearing, and burning     Pain is more bothersome in legs  Continues to use THC      Continues home exercises and tens unit   Medications reviewed. Patient denies side effects with medications. Patient states he is taking medications as prescribed. Hedenies receiving pain medications from other sources. He denies any ER visits since last visit.     Pain scale with out pain medications or at its worst is 3-9/10. The patienthas No Known Allergies. Past Medical History  Dion Bassett  has a past medical history of Cerebral artery occlusion with cerebral infarction ([address] DDD (degenerative disc disease), lumbar, Hyperlipidemia, Hypertension, Kidney stones, and Lumbago. Past Surgical History  The patient  has a past surgical history that includes rhinoplasty (2017); Colonoscopy; Lithotripsy; and Pain management procedure (N/A, 1/5/2021). Family History  This patient's family history is not on file. Social History  Dion Bassett  reports that he has been smoking cigarettes. He has been smoking about 0.05 packs per day. He has never used smokeless tobacco. He reports current alcohol use. He reports current drug use. Drug: Marijuana. Medications    Current Outpatient Medications:     gabapentin (NEURONTIN) 300 MG capsule, Take 1 capsule by mouth 3 times daily for 30 days. , Disp: 90 capsule, Rfl: 0    etodolac (LODINE) 400 MG tablet, Take 1 tablet by mouth 3 times daily, Disp: 90 tablet, Rfl: 2    Elastic Bandages & Supports (LUMBAR BACK BRACE/SUPPORT PAD) MISC, 1 each by Does not apply route daily as needed (for pain and lumbar support), Disp: 1 each, Rfl: 0    atorvastatin (LIPITOR) 20 MG tablet, , Disp: , Rfl:     baclofen (LIORESAL) 10 MG tablet, Take 10 mg by mouth 3 times daily, Disp: , Rfl:     tamsulosin (FLOMAX) 0.4 MG capsule, Take 0.4 mg by mouth daily, Disp: , Rfl:     zolpidem (AMBIEN) 5 MG tablet, Take 5 mg by mouth once., Disp: , Rfl:     Subjective:      Review of Systems   Constitutional: Negative. Musculoskeletal: Positive for arthralgias, back pain and myalgias. Neurological: Positive for weakness and numbness. Psychiatric/Behavioral: Positive for sleep disturbance. The patient is nervous/anxious. Objective:     Vitals:    02/11/21 0848   BP: 122/64   Temp: 97.6 °F (36.4 °C)   Weight: 178 lb (80.7 kg)   Height: 5' 11\" (1.803 m)       Physical Exam  Vitals signs reviewed. Constitutional:       General: He is not in acute distress. Appearance: He is well-developed. He is not diaphoretic. HENT:      Head: Normocephalic and atraumatic. Not macrocephalic and not microcephalic. Right Ear: External ear normal.      Left Ear: External ear normal.      Mouth/Throat:      Mouth: Mucous membranes are moist.   Eyes:      General:         Right eye: No discharge. Left eye: No discharge. Conjunctiva/sclera: Conjunctivae normal.   Neck:      Musculoskeletal: Muscular tenderness present. Trachea: No tracheal deviation. Cardiovascular:      Rate and Rhythm: Normal rate and regular rhythm. Pulses: Normal pulses. Heart sounds: Normal heart sounds. Pulmonary:      Effort: Pulmonary effort is normal. No respiratory distress. Abdominal:      General: Abdomen is flat. Palpations: Abdomen is soft. Musculoskeletal:         General: Tenderness present. Lumbar back: He exhibits decreased range of motion, tenderness, pain and spasm. Back:       Right upper leg: He exhibits tenderness and bony tenderness. Left upper leg: He exhibits tenderness and bony tenderness. Right lower leg: He exhibits tenderness and bony tenderness. Left lower leg: He exhibits tenderness and bony tenderness. Legs:    Skin:     General: Skin is warm and dry. Coloration: Skin is not pale. Findings: No rash. Neurological:      Mental Status: He is alert and oriented to person, place, and time. Cranial Nerves: No cranial nerve deficit. Sensory: Sensory deficit present. Motor: Weakness present. Comments: 4/5 bilateral lower extremity   + Bilateral leg SLR at 60 degrees    Psychiatric:         Attention and Perception: He is attentive. Speech: Speech normal.         Behavior: Behavior normal.         Thought Content:  Thought content normal.         Judgment: Judgment normal.       GUMARO test: +   Yeomans test: + Gaenslen test: +      Assessment:     1. Lumbosacral radiculitis    2. Lumbar stenosis with neurogenic claudication    3. Lumbar foraminal stenosis    4. Annular tear of lumbar disc    5. Lumbar pain    6. Facet arthropathy of spine    7. Chronic pain syndrome            Plan:      OARRS reviewed. Current MED: 0  Patient was not offered naloxone for home. Discussed long term side effects of medications, tolerance, dependency and addiction. Previous UDS reviewed  UDS preformed today for compliance. Patient told can not receive any pain medications from any other source. No evidence of abuse, diversion or aberrant behavior. Medications and/or procedures to improve function and quality of life- patient understanding with this and that may not be pain free  Discussed with patient about safe storage of medications at home  Discussed possible weaning of medication dosing dependent on treatment/procedure results. Discussed with patient about risks with procedure including infection, reaction to medication, increased pain, or bleeding. Procedure notes reveiwed in detail  Only 75% relief of LESI for 1 day  Plan LESI # 2 @ L4-5. Procedure and risks discussed in detail with patient. Reviewed L-MRI and CT scan   Discussed possible TFLESI, SI MBB and L-facet MBB in future   Resume Neurontin 300 mg TID. No opioids       Meds. Prescribed:   Orders Placed This Encounter   Medications    gabapentin (NEURONTIN) 300 MG capsule     Sig: Take 1 capsule by mouth 3 times daily for 30 days. Dispense:  90 capsule     Refill:  0       Return for LESI # 2 @ L4-5. , follow up after procedure.                Electronically signed by ANGELINA Cook CNP on2/11/2021 at 9:19 AM

## 2021-03-02 ENCOUNTER — TELEPHONE (OUTPATIENT)
Dept: PHYSICAL MEDICINE AND REHAB | Age: 51
End: 2021-03-02

## 2021-03-05 ENCOUNTER — TELEPHONE (OUTPATIENT)
Dept: PHYSICAL MEDICINE AND REHAB | Age: 51
End: 2021-03-05

## 2021-03-05 ENCOUNTER — ANESTHESIA EVENT (OUTPATIENT)
Dept: OPERATING ROOM | Age: 51
End: 2021-03-05
Payer: MEDICAID

## 2021-03-05 ENCOUNTER — ANESTHESIA (OUTPATIENT)
Dept: OPERATING ROOM | Age: 51
End: 2021-03-05
Payer: MEDICAID

## 2021-03-05 NOTE — TELEPHONE ENCOUNTER
Pt. Called office. States that he had a flat tire this morning and is unable to come to todays procedure. Procedure and follow up rescheduled.

## 2021-03-10 ENCOUNTER — HOSPITAL ENCOUNTER (OUTPATIENT)
Age: 51
Setting detail: SPECIMEN
Discharge: HOME | End: 2021-03-10
Payer: MEDICAID

## 2021-03-10 LAB
ABSOLUTE EOS #: 0.25 K/UL (ref 0–0.44)
ABSOLUTE IMMATURE GRANULOCYTE: 0.03 K/UL (ref 0–0.3)
ABSOLUTE LYMPH #: 2.35 K/UL (ref 1.1–3.7)
ABSOLUTE MONO #: 0.62 K/UL (ref 0.1–1.2)
ALBUMIN SERPL-MCNC: 4.2 G/DL (ref 3.5–5.2)
ALBUMIN/GLOBULIN RATIO: 1.7 (ref 1–2.5)
ALP BLD-CCNC: 62 U/L (ref 40–129)
ALT SERPL-CCNC: 13 U/L (ref 5–41)
ANION GAP SERPL CALCULATED.3IONS-SCNC: 10 MMOL/L (ref 9–17)
AST SERPL-CCNC: 17 U/L
BASOPHILS # BLD: 0 % (ref 0–2)
BASOPHILS ABSOLUTE: 0.03 K/UL (ref 0–0.2)
BILIRUB SERPL-MCNC: 1.04 MG/DL (ref 0.3–1.2)
BUN BLDV-MCNC: 12 MG/DL (ref 6–20)
BUN/CREAT BLD: NORMAL (ref 9–20)
CALCIUM SERPL-MCNC: 9.2 MG/DL (ref 8.6–10.4)
CHLORIDE BLD-SCNC: 103 MMOL/L (ref 98–107)
CHOLESTEROL/HDL RATIO: 3.5
CHOLESTEROL: 156 MG/DL
CO2: 23 MMOL/L (ref 20–31)
CREAT SERPL-MCNC: 1.1 MG/DL (ref 0.7–1.2)
DIFFERENTIAL TYPE: ABNORMAL
EOSINOPHILS RELATIVE PERCENT: 3 % (ref 1–4)
GFR AFRICAN AMERICAN: >60 ML/MIN
GFR NON-AFRICAN AMERICAN: >60 ML/MIN
GFR SERPL CREATININE-BSD FRML MDRD: NORMAL ML/MIN/{1.73_M2}
GFR SERPL CREATININE-BSD FRML MDRD: NORMAL ML/MIN/{1.73_M2}
GLUCOSE BLD-MCNC: 72 MG/DL (ref 70–99)
HCT VFR BLD CALC: 45.1 % (ref 40.7–50.3)
HDLC SERPL-MCNC: 44 MG/DL
HEMOGLOBIN: 14.3 G/DL (ref 13–17)
IMMATURE GRANULOCYTES: 0 %
LDL CHOLESTEROL: 97 MG/DL (ref 0–130)
LYMPHOCYTES # BLD: 23 % (ref 24–43)
MCH RBC QN AUTO: 30 PG (ref 25.2–33.5)
MCHC RBC AUTO-ENTMCNC: 31.7 G/DL (ref 28.4–34.8)
MCV RBC AUTO: 94.7 FL (ref 82.6–102.9)
MONOCYTES # BLD: 6 % (ref 3–12)
NRBC AUTOMATED: 0 PER 100 WBC
PDW BLD-RTO: 12.6 % (ref 11.8–14.4)
PLATELET # BLD: 275 K/UL (ref 138–453)
PLATELET ESTIMATE: ABNORMAL
PMV BLD AUTO: 10.5 FL (ref 8.1–13.5)
POTASSIUM SERPL-SCNC: 4.6 MMOL/L (ref 3.7–5.3)
RBC # BLD: 4.76 M/UL (ref 4.21–5.77)
RBC # BLD: ABNORMAL 10*6/UL
SEG NEUTROPHILS: 68 % (ref 36–65)
SEGMENTED NEUTROPHILS ABSOLUTE COUNT: 6.75 K/UL (ref 1.5–8.1)
SODIUM BLD-SCNC: 136 MMOL/L (ref 135–144)
TOTAL PROTEIN: 6.7 G/DL (ref 6.4–8.3)
TRIGL SERPL-MCNC: 75 MG/DL
VLDLC SERPL CALC-MCNC: NORMAL MG/DL (ref 1–30)
WBC # BLD: 10 K/UL (ref 3.5–11.3)
WBC # BLD: ABNORMAL 10*3/UL

## 2021-03-15 RX ORDER — ETODOLAC 400 MG/1
400 TABLET, FILM COATED ORAL 3 TIMES DAILY
Qty: 90 TABLET | Refills: 0 | Status: SHIPPED | OUTPATIENT
Start: 2021-03-15 | End: 2021-04-16

## 2021-03-15 NOTE — TELEPHONE ENCOUNTER
OARRS reviewed. UDS: + for  THC- 11-nor delta 9 carboxy.      Last seen: 12/10/2020    Follow-up: 4/12/2021

## 2021-03-25 ENCOUNTER — TELEPHONE (OUTPATIENT)
Dept: PHYSICAL MEDICINE AND REHAB | Age: 51
End: 2021-03-25

## 2021-03-26 ENCOUNTER — HOSPITAL ENCOUNTER (OUTPATIENT)
Age: 51
Setting detail: OUTPATIENT SURGERY
Discharge: HOME | End: 2021-03-26
Attending: PAIN MEDICINE | Admitting: PAIN MEDICINE
Payer: MEDICAID

## 2021-03-26 ENCOUNTER — APPOINTMENT (OUTPATIENT)
Dept: GENERAL RADIOLOGY | Age: 51
End: 2021-03-26
Attending: PAIN MEDICINE
Payer: MEDICAID

## 2021-03-26 VITALS
RESPIRATION RATE: 17 BRPM | OXYGEN SATURATION: 99 % | DIASTOLIC BLOOD PRESSURE: 64 MMHG | SYSTOLIC BLOOD PRESSURE: 105 MMHG

## 2021-03-26 VITALS
RESPIRATION RATE: 14 BRPM | OXYGEN SATURATION: 99 % | SYSTOLIC BLOOD PRESSURE: 105 MMHG | HEART RATE: 72 BPM | DIASTOLIC BLOOD PRESSURE: 70 MMHG | TEMPERATURE: 98.6 F

## 2021-03-26 PROCEDURE — 7100000011 HC PHASE II RECOVERY - ADDTL 15 MIN: Performed by: PAIN MEDICINE

## 2021-03-26 PROCEDURE — 62323 NJX INTERLAMINAR LMBR/SAC: CPT | Performed by: PAIN MEDICINE

## 2021-03-26 PROCEDURE — 6360000002 HC RX W HCPCS: Performed by: PAIN MEDICINE

## 2021-03-26 PROCEDURE — 2580000003 HC RX 258: Performed by: PAIN MEDICINE

## 2021-03-26 PROCEDURE — 2500000003 HC RX 250 WO HCPCS: Performed by: NURSE ANESTHETIST, CERTIFIED REGISTERED

## 2021-03-26 PROCEDURE — 6360000004 HC RX CONTRAST MEDICATION: Performed by: PAIN MEDICINE

## 2021-03-26 PROCEDURE — 7100000010 HC PHASE II RECOVERY - FIRST 15 MIN: Performed by: PAIN MEDICINE

## 2021-03-26 PROCEDURE — 2709999900 HC NON-CHARGEABLE SUPPLY: Performed by: PAIN MEDICINE

## 2021-03-26 PROCEDURE — 3700000000 HC ANESTHESIA ATTENDED CARE: Performed by: PAIN MEDICINE

## 2021-03-26 PROCEDURE — 2500000003 HC RX 250 WO HCPCS: Performed by: PAIN MEDICINE

## 2021-03-26 PROCEDURE — 3209999900 FLUORO FOR SURGICAL PROCEDURES

## 2021-03-26 PROCEDURE — 6360000002 HC RX W HCPCS: Performed by: NURSE ANESTHETIST, CERTIFIED REGISTERED

## 2021-03-26 PROCEDURE — 3600000054 HC PAIN LEVEL 3 BASE: Performed by: PAIN MEDICINE

## 2021-03-26 RX ORDER — LIDOCAINE HYDROCHLORIDE 20 MG/ML
INJECTION, SOLUTION EPIDURAL; INFILTRATION; INTRACAUDAL; PERINEURAL PRN
Status: DISCONTINUED | OUTPATIENT
Start: 2021-03-26 | End: 2021-03-26 | Stop reason: SDUPTHER

## 2021-03-26 RX ORDER — SODIUM CHLORIDE 9 MG/ML
INJECTION INTRAVENOUS PRN
Status: DISCONTINUED | OUTPATIENT
Start: 2021-03-26 | End: 2021-03-26 | Stop reason: ALTCHOICE

## 2021-03-26 RX ORDER — DEXAMETHASONE SODIUM PHOSPHATE 4 MG/ML
INJECTION, SOLUTION INTRA-ARTICULAR; INTRALESIONAL; INTRAMUSCULAR; INTRAVENOUS; SOFT TISSUE PRN
Status: DISCONTINUED | OUTPATIENT
Start: 2021-03-26 | End: 2021-03-26 | Stop reason: ALTCHOICE

## 2021-03-26 RX ORDER — LIDOCAINE HYDROCHLORIDE 10 MG/ML
INJECTION, SOLUTION INFILTRATION; PERINEURAL PRN
Status: DISCONTINUED | OUTPATIENT
Start: 2021-03-26 | End: 2021-03-26 | Stop reason: ALTCHOICE

## 2021-03-26 RX ORDER — PROPOFOL 10 MG/ML
INJECTION, EMULSION INTRAVENOUS PRN
Status: DISCONTINUED | OUTPATIENT
Start: 2021-03-26 | End: 2021-03-26 | Stop reason: SDUPTHER

## 2021-03-26 RX ADMIN — PROPOFOL 20 MG: 10 INJECTION, EMULSION INTRAVENOUS at 11:24

## 2021-03-26 RX ADMIN — LIDOCAINE HYDROCHLORIDE 50 MG: 20 INJECTION, SOLUTION EPIDURAL; INFILTRATION; INTRACAUDAL; PERINEURAL at 11:23

## 2021-03-26 RX ADMIN — PROPOFOL 80 MG: 10 INJECTION, EMULSION INTRAVENOUS at 11:23

## 2021-03-26 ASSESSMENT — PAIN DESCRIPTION - PROGRESSION: CLINICAL_PROGRESSION: GRADUALLY IMPROVING

## 2021-03-26 ASSESSMENT — PULMONARY FUNCTION TESTS
PIF_VALUE: 0

## 2021-03-26 ASSESSMENT — PAIN DESCRIPTION - DESCRIPTORS: DESCRIPTORS: ACHING;CONSTANT

## 2021-03-26 ASSESSMENT — LIFESTYLE VARIABLES: SMOKING_STATUS: 1

## 2021-03-26 ASSESSMENT — PAIN DESCRIPTION - LOCATION: LOCATION: BACK

## 2021-03-26 ASSESSMENT — PAIN DESCRIPTION - FREQUENCY: FREQUENCY: CONTINUOUS

## 2021-03-26 NOTE — H&P
[facility]  History and Physical Update    Pt Name: Zack Soria  MRN: [id number]  YOB: 1970  Date of evaluation: 3/26/2021      I have examined the patient and reviewed the H&P/Consult and there are no changes to the patient or plans.         Electronically signed by Syd Sheth MD on 3/26/2021 at 10:22 AM

## 2021-03-26 NOTE — PROGRESS NOTES
1129 Denies needs or c/o severe pain. Pleasant. 1134 Snacks given, patient hurried to leave. [facility] for discharge. Matthew called.

## 2021-03-26 NOTE — ANESTHESIA PRE PROCEDURE
Department of Anesthesiology  Preprocedure Note       Name:  Gabriele Dupont   Age:  45 y.o.  :  1970                                          MRN:  [id number]         Date:  3/26/2021      Surgeon: Tim Cook):  Titus Mora MD    Procedure: Procedure(s):  LESI # 2 @ L4-5    Medications prior to admission:   Prior to Admission medications    Medication Sig Start Date End Date Taking? Authorizing Provider   etodolac (LODINE) 400 MG tablet Take 1 tablet by mouth 3 times daily 3/15/21 4/14/21  ANGELINA Loredo CNP   gabapentin (NEURONTIN) 300 MG capsule Take 1 capsule by mouth 3 times daily for 30 days. 2/11/21 3/13/21  ANGELINA Loredo CNP   Elastic Bandages & Supports (LUMBAR BACK BRACE/SUPPORT PAD) MISC 1 each by Does not apply route daily as needed (for pain and lumbar support) 12/10/20   ANGELINA Saldana CNP   atorvastatin (LIPITOR) 20 MG tablet  20   Historical Provider, MD   baclofen (LIORESAL) 10 MG tablet Take 10 mg by mouth 3 times daily 20   Historical Provider, MD   tamsulosin (FLOMAX) 0.4 MG capsule Take 0.4 mg by mouth daily 20   Historical Provider, MD   zolpidem (AMBIEN) 5 MG tablet Take 5 mg by mouth once. 10/6/20   Historical Provider, MD       Current medications:    No current outpatient medications on file. No current facility-administered medications for this visit.         Allergies:  No Known Allergies    Problem List:    Patient Active Problem List   Diagnosis Code    Lumbar radiculopathy M54.16    Spinal stenosis of lumbar region with neurogenic claudication M48.062       Past Medical History:        Diagnosis Date    [address]erebral artery occlusion with cerebral infarction ([facility] [address]     DDD (degenerative disc disease), lumbar     Hyperlipidemia     Hypertension     Kidney stones     Lumbago        Past Surgical History:        Procedure Laterality Date    COLONOSCOPY      LITHOTRIPSY      PAIN MANAGEMENT PROCEDURE N/A 2021    LESI L5 #1 performed by Jose Fitzpatrick MD at [place] [address]  2017       Social History:    Social History     Tobacco Use    Smoking status: Current Every Day Smoker     Packs/day: 0.05     Types: Cigarettes    Smokeless tobacco: Never Used   Substance Use Topics    Alcohol use: Yes     Comment: occasional                                Ready to quit: Not Answered  Counseling given: Not Answered      Vital Signs (Current): There were no vitals filed for this visit. BP Readings from Last 3 Encounters:   02/11/21 122/64   01/05/21 (!) 99/58   01/05/21 121/77       NPO Status:                                                                                 BMI:   Wt Readings from Last 3 Encounters:   02/11/21 178 lb (80.7 kg)   01/05/21 178 lb (80.7 kg)   12/10/20 167 lb (75.8 kg)     There is no height or weight on file to calculate BMI.    CBC:   Lab Results   Component Value Date    WBC 10.0 03/10/2021    RBC 4.76 03/10/2021    HGB 14.3 03/10/2021    HCT 45.1 03/10/2021    MCV 94.7 03/10/2021    RDW 12.6 03/10/2021     03/10/2021       CMP:   Lab Results   Component Value Date     03/10/2021    K 4.6 03/10/2021     03/10/2021    CO2 23 03/10/2021    BUN 12 03/10/2021    CREATININE 1.10 03/10/2021    GFRAA >60 03/10/2021    LABGLOM >60 03/10/2021    GLUCOSE 72 03/10/2021    PROT 6.7 03/10/2021    CALCIUM 9.2 03/10/2021    BILITOT 1.04 03/10/2021    ALKPHOS 62 03/10/2021    AST 17 03/10/2021    ALT 13 03/10/2021       POC Tests: No results for input(s): POCGLU, POCNA, POCK, POCCL, POCBUN, POCHEMO, POCHCT in the last 72 hours.     Coags:   Lab Results   Component Value Date    INR 1.03 10/29/2020       HCG (If Applicable): No results found for: PREGTESTUR, PREGSERUM, HCG, HCGQUANT     ABGs: No results found for: PHART, PO2ART, KGG1NUE, XWD0EMW, BEART, M8DJEUWK     Type & Screen (If Applicable):  No results found for: Coco Gregory Drug/Infectious Status (If Applicable):  No results found for: HIV, HEPCAB    COVID-19 Screening (If Applicable): No results found for: COVID19      Anesthesia Evaluation  Patient summary reviewed no history of anesthetic complications:   Airway: Mallampati: II  TM distance: >3 FB   Neck ROM: full  Mouth opening: > = 3 FB Dental:          Pulmonary:normal exam    (+) current smoker                           Cardiovascular:    (+) hypertension:, hyperlipidemia                  Neuro/Psych:   (+) CVA:, neuromuscular disease:,             GI/Hepatic/Renal:   (+) renal disease: kidney stones,           Endo/Other:                     Abdominal:           Vascular:                                          Anesthesia Plan      MAC     ASA 2       Induction: intravenous. Anesthetic plan and risks discussed with patient.       Plan discussed with CRNA and surgical team.                  Zach Kruger DO   3/26/2021

## 2021-03-26 NOTE — H&P
H&P    FU from [address] # 1 from 1/5/2021. Received 75% relief of of low back an dleg pain but less than 24 hours and then pain came right back. Pain startes in lower back and radiates across and down bilateral legs posterior to toes- pain in lower back is \"electrical charge, burning, aching\" and in legs - tearing, and burning      Pain is more bothersome in legs  Continues to use THC       Continues home exercises and tens unit   Medications reviewed. Patient denies side effects with medications. Patient states he is taking medications as prescribed. Hedenies receiving pain medications from other sources. He denies any ER visits since last visit.     Pain scale with out pain medications or at its worst is 3-9/10.     The patienthas No Known Allergies.     Past Medical History  Dale  has a past medical history of Cerebral artery occlusion with cerebral infarction [facility]), DDD (degenerative disc disease), lumbar, Hyperlipidemia, Hypertension, Kidney stones, and Lumbago.     Past Surgical History  The patient  has a past surgical history that includes rhinoplasty (2017); Colonoscopy; Lithotripsy; and Pain management procedure (N/A, 1/5/2021).    Family History  This patient's family history is not on file.     Social History  Dale  reports that he has been smoking cigarettes. He has been smoking about 0.05 packs per day. He has never used smokeless tobacco. He reports current alcohol use. He reports current drug use. Drug: Marijuana.     Medications    Current Medication      Current Outpatient Medications:     gabapentin (NEURONTIN) 300 MG capsule, Take 1 capsule by mouth 3 times daily for 30 days. , Disp: 90 capsule, Rfl: 0    etodolac (LODINE) 400 MG tablet, Take 1 tablet by mouth 3 times daily, Disp: 90 tablet, Rfl: 2    Elastic Bandages & Supports (LUMBAR BACK BRACE/SUPPORT PAD) MISC, 1 each by Does not apply route daily as needed (for pain and lumbar support), Disp: 1 each, Rfl: 0    atorvastatin (LIPITOR) 20 MG tablet, , Disp: , Rfl:     baclofen (LIORESAL) 10 MG tablet, Take 10 mg by mouth 3 times daily, Disp: , Rfl:     tamsulosin (FLOMAX) 0.4 MG capsule, Take 0.4 mg by mouth daily, Disp: , Rfl:     zolpidem (AMBIEN) 5 MG tablet, Take 5 mg by mouth once., Disp: , Rfl:         Subjective:      Review of Systems   Constitutional: Negative. Musculoskeletal: Positive for arthralgias, back pain and myalgias. Neurological: Positive for weakness and numbness. Psychiatric/Behavioral: Positive for sleep disturbance. The patient is nervous/anxious.          Objective:      Vitals       Vitals:     02/11/21 0848   BP: 122/64   Temp: 97.6 °F (36.4 °C)   Weight: 178 lb (80.7 kg)   Height: 5' 11\" (1.803 m)            Physical Exam  Vitals signs reviewed. Constitutional:       General: He is not in acute distress. Appearance: He is well-developed. He is not diaphoretic. HENT:      Head: Normocephalic and atraumatic. Not macrocephalic and not microcephalic. Right Ear: External ear normal.      Left Ear: External ear normal.      Mouth/Throat:      Mouth: Mucous membranes are moist.   Eyes:      General:         Right eye: No discharge. Left eye: No discharge. Conjunctiva/sclera: Conjunctivae normal.   Neck:      Musculoskeletal: Muscular tenderness present. Trachea: No tracheal deviation. Cardiovascular:      Rate and Rhythm: Normal rate and regular rhythm. Pulses: Normal pulses. Heart sounds: Normal heart sounds. Pulmonary:      Effort: Pulmonary effort is normal. No respiratory distress. Abdominal:      General: Abdomen is flat. Palpations: Abdomen is soft. Musculoskeletal:         General: Tenderness present. Lumbar back: He exhibits decreased range of motion, tenderness, pain and spasm. Back:       Right upper leg: He exhibits tenderness and bony tenderness. Left upper leg: He exhibits tenderness and bony tenderness.       Right lower leg: He exhibits tenderness and bony tenderness. Left lower leg: He exhibits tenderness and bony tenderness. Legs:    Skin:     General: Skin is warm and dry. Coloration: Skin is not pale. Findings: No rash. Neurological:      Mental Status: He is alert and oriented to person, place, and time. Cranial Nerves: No cranial nerve deficit. Sensory: Sensory deficit present. Motor: Weakness present. Comments: 4/5 bilateral lower extremity   + Bilateral leg SLR at 60 degrees    Psychiatric:         Attention and Perception: He is attentive. Speech: Speech normal.         Behavior: Behavior normal.         Thought Content: Thought content normal.         Judgment: Judgment normal.         GUMARO test: +   Yeomans test: +   Gaenslen test: +   Assessment:      1. Lumbosacral radiculitis    2. Lumbar stenosis with neurogenic claudication    3. Lumbar foraminal stenosis    4. Annular tear of lumbar disc    5. Lumbar pain    6. Facet arthropathy of spine    7. Chronic pain syndrome       Plan:      · OARRS reviewed. Current MED: 0  · Patient was not offered naloxone for home. · Discussed long term side effects of medications, tolerance, dependency and addiction. · Previous UDS reviewed  · UDS preformed today for compliance. · Patient told can not receive any pain medications from any other source. · No evidence of abuse, diversion or aberrant behavior. · Medications and/or procedures to improve function and quality of life- patient understanding with this and that may not be pain free  · Discussed with patient about safe storage of medications at home  · Discussed possible weaning of medication dosing dependent on treatment/procedure results. · Discussed with patient about risks with procedure including infection, reaction to medication, increased pain, or bleeding. · Procedure notes reveiwed in detail  · Only 75% relief of LESI for 1 day  · Plan LESI # 2 @ L4-5.  Procedure and risks discussed in detail with patient. · Reviewed L-MRI and CT scan   · Discussed possible TFLESI, SI MBB and L-facet MBB in future   · Resume Neurontin 300 mg TID.    · No opioids               Return for LESI # 2 @ L4-5. , follow up after procedure.

## 2021-03-26 NOTE — OP NOTE
Operative Note  Pre-Procedure Note    Patient Name: Yesenia Landa   YOB: 1970  Medical Record Number: [id number]  Date: 3/26/21       Indication:  L-spinal stenosis  Consent: On file. Vital Signs: There were no vitals filed for this visit. Past Medical History:   has a past medical history of Cerebral artery occlusion with cerebral infarction ([address] DDD (degenerative disc disease), lumbar, Hyperlipidemia, Hypertension, Kidney stones, and Lumbago. Past Surgical History:   has a past surgical history that includes rhinoplasty (2017); Colonoscopy; Lithotripsy; and Pain management procedure (N/A, 1/5/2021). Pre-Sedation Documentation and Exam:   Vital signs have been reviewed (see flow sheet for vitals). Sedation/Anesthesia Plan:   MAC    Medications Planned:   Per Anesthesia. Patient is an appropriate candidate for plan of sedation: yes       Preoperative Diagnosis:  L-spinal stenosis, L-radiculopathy     Post-Op Dx:      Procedure Performed:  Lumbar epidural steroid injection under fluoroscopy guidance # 2.     Indication for the Procedure: The patient failed conservative management  for pain in the low back radiating to lower extremities. The patient is undergoing lumbar epidural steroid injection. As the patient is not responding to conservative management and it is interfering with activities of daily living we decided to proceed with lumbar epidural steroid injection. The procedure and risks were discussed with the patient and an informed consent was obtained     Procedure:     The patient is placed in prone position. Skin over the back was prepped and draped in sterile manner. Then using fluoroscopy the L4 interspace was observed and the skin and deep tissues in the  paramedian area were infiltrated with 3 ml of 1% lidocaine. The #20-gauge, 3-1/2 inch Tuohy needle was inserted through the skin wheal and the epidural space was identified using loss of resistance technique .  This was confirmed with AP and lateral views using fluoroscopy after injecting about 2 ml of Omnipaque-180 and observing the spread of the contrast in the epidural space. Then after negative aspiration a total of 12 mg of dexamethasone with 4 ml of normal saline was injected into the epidural space. The needle is removed and a Band-Aid was placed over the needle insertion site. No paresthesia. EBL-0  Patient's vital signs remained stable and the patient tolerated the procedure well. The patient will be discharged home in stable condition and will be followed in the office in the next few weeks for further planning.     Electronically signed by Titus Mora MD on 3/26/21 at 11:21 AM EDT

## 2021-03-29 RX ORDER — GABAPENTIN 300 MG/1
300 CAPSULE ORAL 3 TIMES DAILY
Qty: 90 CAPSULE | Refills: 0 | Status: SHIPPED | OUTPATIENT
Start: 2021-03-29 | End: 2021-05-19 | Stop reason: DRUGHIGH

## 2021-04-07 ENCOUNTER — OFFICE VISIT (OUTPATIENT)
Dept: UROLOGY | Age: 51
End: 2021-04-07
Payer: MEDICAID

## 2021-04-07 VITALS
TEMPERATURE: 97.3 F | HEIGHT: 71 IN | SYSTOLIC BLOOD PRESSURE: 124 MMHG | BODY MASS INDEX: 25.66 KG/M2 | WEIGHT: 183.3 LBS | DIASTOLIC BLOOD PRESSURE: 72 MMHG

## 2021-04-07 DIAGNOSIS — N40.1 HYPERTROPHY OF PROSTATE WITH URINARY OBSTRUCTION: ICD-10-CM

## 2021-04-07 DIAGNOSIS — R30.0 DYSURIA: Primary | ICD-10-CM

## 2021-04-07 DIAGNOSIS — N13.8 HYPERTROPHY OF PROSTATE WITH URINARY OBSTRUCTION: ICD-10-CM

## 2021-04-07 LAB
BILIRUBIN URINE: ABNORMAL
BLOOD URINE, POC: NEGATIVE
CHARACTER, URINE: CLEAR
COLOR, URINE: YELLOW
GLUCOSE URINE: NEGATIVE MG/DL
KETONES, URINE: NEGATIVE
LEUKOCYTE CLUMPS, URINE: ABNORMAL
NITRITE, URINE: NEGATIVE
PH, URINE: 6 (ref 5–9)
PROTEIN, URINE: NEGATIVE MG/DL
SPECIFIC GRAVITY, URINE: 1.02 (ref 1–1.03)
UROBILINOGEN, URINE: 0.2 EU/DL (ref 0–1)

## 2021-04-07 PROCEDURE — 99204 OFFICE O/P NEW MOD 45 MIN: CPT | Performed by: UROLOGY

## 2021-04-07 PROCEDURE — 4004F PT TOBACCO SCREEN RCVD TLK: CPT | Performed by: UROLOGY

## 2021-04-07 PROCEDURE — 81003 URINALYSIS AUTO W/O SCOPE: CPT | Performed by: UROLOGY

## 2021-04-07 PROCEDURE — G8427 DOCREV CUR MEDS BY ELIG CLIN: HCPCS | Performed by: UROLOGY

## 2021-04-07 PROCEDURE — G8419 CALC BMI OUT NRM PARAM NOF/U: HCPCS | Performed by: UROLOGY

## 2021-04-07 PROCEDURE — 3017F COLORECTAL CA SCREEN DOC REV: CPT | Performed by: UROLOGY

## 2021-04-07 RX ORDER — TRAZODONE HYDROCHLORIDE 50 MG/1
50 TABLET ORAL NIGHTLY PRN
COMMUNITY
Start: 2021-04-06

## 2021-04-07 NOTE — PROGRESS NOTES
Dr. Ilir Keller MD  [facility])  Urology Clinic  New Patient Visit      Patient:  Emilie Killian  YOB: 1970  Date: 4/7/2021    HISTORY OF PRESENT ILLNESS:   The patient is a 45 y.o. male who presents today for evaluation of the following problem(s): Severe BPH requiring 10 minutes to go. On Flomax and this isnt helping much. Overall the problem(s) : are worsening. Associated Symptoms: No dysuria, gross hematuria. Pain Severity: 0/10    Summary of old records:   Took flomax. Imaging/Labs reviewed during today's visit:  I have independently reviewed and verified the following films during today's visit.   None    Last several PSA's:  No results found for: PSA    Last total testosterone:  No results found for: TESTOSTERONE    Urinalysis today:  Results for POC orders placed in visit on 04/07/21   POCT Urinalysis No Micro (Auto)   Result Value Ref Range    Glucose, Ur Negative NEGATIVE mg/dl    Bilirubin Urine Large (A)     Ketones, Urine Negative NEGATIVE    Specific Gravity, Urine 1.025 1.002 - 1.030    Blood, UA POC Negative NEGATIVE    pH, Urine 6.00 5.0 - 9.0    Protein, Urine Negative NEGATIVE mg/dl    Urobilinogen, Urine 0.20 0.0 - 1.0 eu/dl    Nitrite, Urine Negative NEGATIVE    Leukocyte Clumps, Urine Small (A) NEGATIVE    Color, Urine Yellow YELLOW-STRAW    Character, Urine Clear [id number].CLOUD         Last BUN and creatinine:  Lab Results   Component Value Date    BUN 12 03/10/2021     Lab Results   Component Value Date    CREATININE 1.10 03/10/2021       Imaging Reviewed during this Office Visit:   (results were independently reviewed by physician and radiology report verified)    PAST MEDICAL, FAMILY AND SOCIAL HISTORY:  Past Medical History:   Diagnosis Date    Cerebral artery occlusion with cerebral infarction ([address]     DDD (degenerative disc disease), lumbar     Hyperlipidemia     Hypertension     Kidney stones     Lumbago      Past Surgical History:   Procedure Laterality Date    COLONOSCOPY      LITHOTRIPSY      PAIN MANAGEMENT PROCEDURE N/A 1/5/2021    LESI L5 #1 performed by Mell Syed MD at [place] [address] N/A 3/26/2021    LESI # 2 @ L4 performed by Mell Syed MD at [facility]  2017     No family history on file. Outpatient Medications Marked as Taking for the 4/7/21 encounter (Office Visit) with Lorrie Bruner MD   Medication Sig Dispense Refill    traZODone (DESYREL) 50 MG tablet Take 50 mg by mouth nightly       gabapentin (NEURONTIN) 300 MG capsule Take 1 capsule by mouth 3 times daily for 30 days. 90 capsule 0    etodolac (LODINE) 400 MG tablet Take 1 tablet by mouth 3 times daily 90 tablet 0    Elastic Bandages & Supports (LUMBAR BACK BRACE/SUPPORT PAD) MISC 1 each by Does not apply route daily as needed (for pain and lumbar support) 1 each 0    baclofen (LIORESAL) 10 MG tablet Take 10 mg by mouth 3 times daily      tamsulosin (FLOMAX) 0.4 MG capsule Take 0.4 mg by mouth daily      zolpidem (AMBIEN) 5 MG tablet Take 5 mg by mouth once. Patient has no known allergies. Social History     Tobacco Use   Smoking Status Current Every Day Smoker    Packs/day: 0.05    Types: Cigarettes   Smokeless Tobacco Never Used       Social History     Substance and Sexual Activity   Alcohol Use Yes    Comment: occasional       REVIEW OF SYSTEMS:  Constitutional: negative  Eyes: negative  Respiratory: negative  Cardiovascular: negative  Gastrointestinal: negative  Musculoskeletal: negative  Genitourinary: negative except for what is in HPI  Skin: negative   Neurological: negative  Hematological/Lymphatic: negative  Psychological: negative    Physical Exam:    This a 45 y.o. male   Vitals:    04/07/21 1144   BP: 124/72   Temp: 97.3 °F (36.3 °C)     Constitutional: Patient in no acute distress; Neuro: alert and oriented to person place and time.     Psych: Mood and affect normal.  Skin: Normal  Lungs: Respiratory effort normal  Cardiovascular:  Normal peripheral pulses  Abdomen: Soft, non-tender, non-distended with no CVA, flank pain, hepatosplenomegaly or hernia. Kidneys normal.  Bladder non-tender and not distended. Lymphatics: no palpable lymphadenopathy      Assessment and Plan      1. Dysuria    2. Hypertrophy of prostate with urinary obstruction           Plan:      No follow-ups on file. Schedule for cystoscopy. Worsening BPH on Flomax.          Dr. Joy Bull MD

## 2021-04-07 NOTE — PATIENT INSTRUCTIONS
Patient Education        Stopping Smoking: Care Instructions  Your Care Instructions     Cigarette smokers crave the nicotine in cigarettes. Giving it up is much harder than simply changing a habit. Your body has to stop craving the nicotine. It is hard to quit, but you can do it. There are many tools that people use to quit smoking. You may find that combining tools works best for you. There are several steps to quitting. First you get ready to quit. Then you get support to help you. After that, you learn new skills and behaviors to become a nonsmoker. For many people, a necessary step is getting and using medicine. Your doctor will help you set up the plan that best meets your needs. You may want to attend a smoking cessation program to help you quit smoking. When you choose a program, look for one that has proven success. Ask your doctor for ideas. You will greatly increase your chances of success if you take medicine as well as get counseling or join a cessation program.  Some of the changes you feel when you first quit tobacco are uncomfortable. Your body will miss the nicotine at first, and you may feel short-tempered and grumpy. You may have trouble sleeping or concentrating. Medicine can help you deal with these symptoms. You may struggle with changing your smoking habits and rituals. The last step is the tricky one: Be prepared for the smoking urge to continue for a time. This is a lot to deal with, but keep at it. You will feel better. Follow-up care is a key part of your treatment and safety. Be sure to make and go to all appointments, and call your doctor if you are having problems. It's also a good idea to know your test results and keep a list of the medicines you take. How can you care for yourself at home? · Ask your family, friends, and coworkers for support. You have a better chance of quitting if you have help and support.   · Join a support group, such as Nicotine Anonymous, for people who are trying to quit smoking. · Consider signing up for a smoking cessation program, such as the American Lung Association's Freedom from Smoking program.  · Get text messaging support. Go to the website at www.smokefree. gov to sign up for the [facility] program.  · Set a quit date. Pick your date carefully so that it is not right in the middle of a big deadline or stressful time. Once you quit, do not even take a puff. Get rid of all ashtrays and lighters after your last cigarette. Clean your house and your clothes so that they do not smell of smoke. · Learn how to be a nonsmoker. Think about ways you can avoid those things that make you reach for a cigarette. ? Avoid situations that put you at greatest risk for smoking. For some people, it is hard to have a drink with friends without smoking. For others, they might skip a coffee break with coworkers who smoke. ? Change your daily routine. Take a different route to work or eat a meal in a different place. · Cut down on stress. Calm yourself or release tension by doing an activity you enjoy, such as reading a book, taking a hot bath, or gardening. · Talk to your doctor or pharmacist about nicotine replacement therapy, which replaces the nicotine in your body. You still get nicotine but you do not use tobacco. Nicotine replacement products help you slowly reduce the amount of nicotine you need. These products come in several forms, many of them available over-the-counter:  ? Nicotine patches  ? Nicotine gum and lozenges  ? Nicotine inhaler  · Ask your doctor about bupropion (Wellbutrin) or varenicline (Chantix), which are prescription medicines. They do not contain nicotine. They help you by reducing withdrawal symptoms, such as stress and anxiety. · Some people find hypnosis, acupuncture, and massage helpful for ending the smoking habit. · Eat a healthy diet and get regular exercise.  Having healthy habits will help your body move past its craving for nicotine. · Be prepared to keep trying. Most people are not successful the first few times they try to quit. Do not get mad at yourself if you smoke again. Make a list of things you learned and think about when you want to try again, such as next week, next month, or next year. Where can you learn more? Go to https://PublicEarthpekarenewdania.AeroFS. org and sign in to your [employer] account. Enter O484 in the Jobyourlife box to learn more about \"Stopping Smoking: Care Instructions. \"     If you do not have an account, please click on the \"Sign Up Now\" link. Current as of: March 12, 2020               Content Version: 12.8  © 2006-2021 Healthwise, Incorporated. Care instructions adapted under license by [facility] ([facility]). If you have questions about a medical condition or this instruction, always ask your healthcare professional. [address] any warranty or liability for your use of this information.

## 2021-04-12 ENCOUNTER — OFFICE VISIT (OUTPATIENT)
Dept: PHYSICAL MEDICINE AND REHAB | Age: 51
End: 2021-04-12
Payer: MEDICAID

## 2021-04-12 VITALS
SYSTOLIC BLOOD PRESSURE: 124 MMHG | HEIGHT: 71 IN | BODY MASS INDEX: 25.62 KG/M2 | WEIGHT: 183 LBS | DIASTOLIC BLOOD PRESSURE: 70 MMHG

## 2021-04-12 DIAGNOSIS — M54.50 LUMBAR PAIN: ICD-10-CM

## 2021-04-12 DIAGNOSIS — M48.061 LUMBAR FORAMINAL STENOSIS: ICD-10-CM

## 2021-04-12 DIAGNOSIS — G89.4 CHRONIC PAIN SYNDROME: ICD-10-CM

## 2021-04-12 DIAGNOSIS — M47.816 SPONDYLOSIS OF LUMBAR REGION WITHOUT MYELOPATHY OR RADICULOPATHY: Primary | ICD-10-CM

## 2021-04-12 DIAGNOSIS — M48.062 LUMBAR STENOSIS WITH NEUROGENIC CLAUDICATION: ICD-10-CM

## 2021-04-12 DIAGNOSIS — M47.819 FACET ARTHROPATHY OF SPINE: ICD-10-CM

## 2021-04-12 DIAGNOSIS — M51.36 ANNULAR TEAR OF LUMBAR DISC: ICD-10-CM

## 2021-04-12 DIAGNOSIS — M54.17 LUMBOSACRAL RADICULITIS: ICD-10-CM

## 2021-04-12 PROCEDURE — G8419 CALC BMI OUT NRM PARAM NOF/U: HCPCS | Performed by: NURSE PRACTITIONER

## 2021-04-12 PROCEDURE — G8427 DOCREV CUR MEDS BY ELIG CLIN: HCPCS | Performed by: NURSE PRACTITIONER

## 2021-04-12 PROCEDURE — 4004F PT TOBACCO SCREEN RCVD TLK: CPT | Performed by: NURSE PRACTITIONER

## 2021-04-12 PROCEDURE — 99214 OFFICE O/P EST MOD 30 MIN: CPT | Performed by: NURSE PRACTITIONER

## 2021-04-12 PROCEDURE — 3017F COLORECTAL CA SCREEN DOC REV: CPT | Performed by: NURSE PRACTITIONER

## 2021-04-12 ASSESSMENT — ENCOUNTER SYMPTOMS: BACK PAIN: 1

## 2021-04-12 NOTE — PROGRESS NOTES
[address] [facility] [address]  Dept: [phone]  Dept Fax: [phone]: [phone]    Visit Date: 4/12/2021    Functionality Assessment/Goals Worksheet     On a scale of 0 (Does not Interfere) to 10 (Completely Interferes)     1. Which number describes how during the past week pain has interfered with       the following:  A. General Activity:  8  B. Mood: 9  C. Walking Ability:  9  D. Normal Work (Includes both work outside the home and housework):  4  E. Relations with Other People:   9  F. Sleep:   9  G. Enjoyment of Life:   9    2. Patient Prefers to Take their Pain Medications:     []  On a regular basis   [x]  Only when necessary    []  Does not take pain medications    3. What are the Patient's Goals/Expectations for Visiting Pain Management? [x]  Learn about my pain    [x]  Receive Medication   []  Physical Therapy     [x]  Treat Depression   []  Receive Injections    []  Treat Sleep   [x]  Deal with Anxiety and Stress   []  Treat Opoid Dependence/Addiction   []  Other:      HPI:   Javid Murphy is a 45 y.o. male is here today for    Chief Complaint: Low back pain and leg pain     HPI   FU from LESI # 2 from 3/26/2021. Again only received 1 days of relief from LESI # 2. Continues to have pain in low back radiating down bilateral legs posterior to toes- pain in lower back is \"electrical charge, burning, aching\" and in legs - tearing, and burning. \"It feels like there is a fish hook in my back and when I walk someone is tugging on it\". Pain remains more bothersome in legs. Pain is 60% bothersome in legs compared to 40% in the back   Neurontin does help   Medications reviewed. Patient denies side effects with medications. Patient states he is taking medications as prescribed. Hedenies receiving pain medications from other sources.  He denies any ER visits since last visit. Pain scale with out pain medications or at its worst is 7-8/10. Last dose of Neuorntin was today  Drug screen reviewed from 10/19/2020 and was + for [facility]    The patienthas No Known Allergies. Subjective:      Review of Systems   Constitutional: Negative. Musculoskeletal: Positive for arthralgias, back pain and myalgias. Neurological: Positive for weakness and numbness. Psychiatric/Behavioral: Positive for sleep disturbance. The patient is nervous/anxious. Objective:     Vitals:    04/12/21 1140   BP: 124/70   Weight: 183 lb (83 kg)   Height: 5' 11\" (1.803 m)       Physical Exam  Vitals signs reviewed. Constitutional:       General: He is not in acute distress. Appearance: He is well-developed. He is not diaphoretic. HENT:      Head: Normocephalic and atraumatic. Not macrocephalic and not microcephalic. Right Ear: External ear normal.      Left Ear: External ear normal.      Mouth/Throat:      Mouth: Mucous membranes are moist.   Eyes:      General:         Right eye: No discharge. Left eye: No discharge. Conjunctiva/sclera: Conjunctivae normal.   Neck:      Musculoskeletal: Muscular tenderness present. Trachea: No tracheal deviation. Cardiovascular:      Rate and Rhythm: Normal rate and regular rhythm. Pulses: Normal pulses. Heart sounds: Normal heart sounds. Pulmonary:      Effort: Pulmonary effort is normal. No respiratory distress. Abdominal:      General: Abdomen is flat. Palpations: Abdomen is soft. Musculoskeletal:         General: Tenderness present. Lumbar back: He exhibits decreased range of motion, tenderness, pain and spasm. Back:       Right upper leg: He exhibits tenderness and bony tenderness. Left upper leg: He exhibits tenderness and bony tenderness. Right lower leg: He exhibits tenderness and bony tenderness. Left lower leg: He exhibits tenderness and bony tenderness.         Legs:    Skin: General: Skin is warm and dry. Coloration: Skin is not pale. Findings: No rash. Neurological:      Mental Status: He is alert and oriented to person, place, and time. Cranial Nerves: No cranial nerve deficit. Sensory: Sensory deficit present. Motor: Weakness present. Comments: 4/5 bilateral lower extremity   + Bilateral leg SLR at 60 degrees    Psychiatric:         Attention and Perception: He is attentive. Speech: Speech normal.         Behavior: Behavior normal.         Thought Content: Thought content normal.         Judgment: Judgment normal.       GUMARO test: + bilaterally   Yeomans test: + bilaterally   Gaenslen test: + bilaterally      Assessment:     1. Spondylosis of lumbar region without myelopathy or radiculopathy    2. Facet arthropathy of spine    3. Lumbosacral radiculitis    4. Lumbar stenosis with neurogenic claudication    5. Lumbar foraminal stenosis    6. Annular tear of lumbar disc    7. Lumbar pain    8. Chronic pain syndrome            Plan:      OARRS reviewed. Current MED: 0  Patient was not offered naloxone for home. Discussed long term side effects of medications, tolerance, dependency and addiction. Previous UDS reviewed  UDS preformed today for compliance. Patient told can not receive any pain medications from any other source. No evidence of abuse, diversion or aberrant behavior. Medications and/or procedures to improve function and quality of life- patient understanding with this and that may not be pain free  Discussed with patient about safe storage of medications at home  Discussed possible weaning of medication dosing dependent on treatment/procedure results. Discussed with patient about risks with procedure including infection, reaction to medication, increased pain, or bleeding. Procedure notes reveiwed in detail  Only one day a relief from LESI X 2.    Reviewed L-MRI and CT scan   Plan bilateral L-facet MBB # 1 @ L3-4, L4-5, and

## 2021-04-16 RX ORDER — ETODOLAC 400 MG/1
TABLET, FILM COATED ORAL
Qty: 90 TABLET | Refills: 0 | Status: SHIPPED | OUTPATIENT
Start: 2021-04-16 | End: 2021-05-10

## 2021-04-16 NOTE — TELEPHONE ENCOUNTER
OARRS reviewed. UDS: Inconsistent THC- 11-nor delta 9 carboxy. Last seen: 4/12/2021.  Follow-up:   Future Appointments   Date Time Provider Pooja Jimenezi   4/21/2021 11:45 AM Mike Keita MD [address]   5/20/2021 10:00 AM ANGELINA Waters - CNP [facility]

## 2021-04-19 ENCOUNTER — TELEPHONE (OUTPATIENT)
Dept: PHYSICAL MEDICINE AND REHAB | Age: 51
End: 2021-04-19

## 2021-04-21 ENCOUNTER — PROCEDURE VISIT (OUTPATIENT)
Dept: UROLOGY | Age: 51
End: 2021-04-21
Payer: MEDICAID

## 2021-04-21 ENCOUNTER — TELEPHONE (OUTPATIENT)
Dept: UROLOGY | Age: 51
End: 2021-04-21

## 2021-04-21 VITALS
HEIGHT: 71 IN | DIASTOLIC BLOOD PRESSURE: 70 MMHG | SYSTOLIC BLOOD PRESSURE: 138 MMHG | BODY MASS INDEX: 25.34 KG/M2 | WEIGHT: 181 LBS

## 2021-04-21 DIAGNOSIS — R30.0 DYSURIA: ICD-10-CM

## 2021-04-21 DIAGNOSIS — N40.1 HYPERTROPHY OF PROSTATE WITH URINARY OBSTRUCTION: Primary | ICD-10-CM

## 2021-04-21 DIAGNOSIS — N13.8 HYPERTROPHY OF PROSTATE WITH URINARY OBSTRUCTION: Primary | ICD-10-CM

## 2021-04-21 LAB
BILIRUBIN URINE: ABNORMAL
BLOOD URINE, POC: NEGATIVE
CHARACTER, URINE: CLEAR
COLOR, URINE: YELLOW
GLUCOSE URINE: NEGATIVE MG/DL
KETONES, URINE: NEGATIVE
LEUKOCYTE CLUMPS, URINE: ABNORMAL
NITRITE, URINE: NEGATIVE
PH, URINE: 6.5 (ref 5–9)
PROTEIN, URINE: NEGATIVE MG/DL
SPECIFIC GRAVITY, URINE: 1.02 (ref 1–1.03)
UROBILINOGEN, URINE: 0.2 EU/DL (ref 0–1)

## 2021-04-21 PROCEDURE — 81003 URINALYSIS AUTO W/O SCOPE: CPT | Performed by: UROLOGY

## 2021-04-21 PROCEDURE — 52000 CYSTOURETHROSCOPY: CPT | Performed by: UROLOGY

## 2021-04-21 NOTE — PROGRESS NOTES
Dr. Sobia Nunn MD  Urologic Surgery        [address] [place] [address]. [place]  04/21/21    Patient:  Trini Aguiar  MRN: [id number]  YOB: 1970    Surgeon: Dr. Sobia Nunn MD  Assistant: None    Pre-op Diagnosis: Severe BPH with outlet obstruction. Post-op Diagnosis: Same    Procedure:   Cystoscopy. Anesthesia: Local  Complications: None  OR Blood Loss: Minimal  Fluids: Cystalloids  Specimens: None    Indication:  48-year-old male on Flomax with worsening urinary symptoms. Here today for cystoscopy. Narrative of the Procedure:    After informed consent was obtained in the preoperative area, the patient was taken back to the operating room and remained on the hospital gurClarence. The patient was prepped and draped in a sterile manner. A time out occurred in which two patient identifiers were used. The flexible scope was carefully placed into the bladder. Findings:  Urethra: Normal.  No stricture or stenosis. Prostate: Bilateral BPH. No median lobe. Bladder Neck: Normal.  Papillary lesions: None. Trabeculations: Moderate  Cellules/Diverticula: None  Bladder Stones: None  Ureteral Orifices: Normal position with clear reflux bilaterally. OVERALL IMPRESSION: Bilateral BPH. Follow-Up: Discussed surgical options. Discussed TURP greenlight and UroLift. Discussed risks and benefits. Discussed retrograde ejaculation. This is not worrisome to the patient. We will proceed with TURP.     Sobia Nunn  Electronically signed on 4/21/2021 at 12:09 PM

## 2021-04-21 NOTE — PATIENT INSTRUCTIONS
You may receive a survey regarding the care you received during your visit. Your input is valuable to us. We encourage you to complete and return your survey. We hope you will choose us in the future for your healthcare needs.   [id number]

## 2021-04-23 ENCOUNTER — TELEPHONE (OUTPATIENT)
Dept: UROLOGY | Age: 51
End: 2021-04-23

## 2021-04-23 DIAGNOSIS — N39.0 URINARY TRACT INFECTION WITHOUT HEMATURIA, SITE UNSPECIFIED: Primary | ICD-10-CM

## 2021-04-23 LAB
ORGANISM: ABNORMAL
URINE CULTURE, ROUTINE: ABNORMAL

## 2021-04-23 RX ORDER — AMPICILLIN 500 MG/1
500 CAPSULE ORAL 4 TIMES DAILY
Qty: 28 CAPSULE | Refills: 0 | Status: SHIPPED | OUTPATIENT
Start: 2021-04-23 | End: 2021-04-30

## 2021-04-23 NOTE — TELEPHONE ENCOUNTER
----- Message from ANGELINA Sousa CNP sent at 4/23/2021  8:02 AM EDT -----  Urine culture positive for infection. Please let them know I have sent ampicillin to their pharmacy. Thanks. Will need another urine culture prior to OR schedule.

## 2021-04-23 NOTE — TELEPHONE ENCOUNTER
I called and notified the patient that his urine is positive for an infection and an antibiotic was sent to his pharmacy. Take abx until completed and he is to have his urine checked again prior OR schedule. Urine culture order mailed to patient.

## 2021-04-30 ENCOUNTER — TELEPHONE (OUTPATIENT)
Dept: PHYSICAL MEDICINE AND REHAB | Age: 51
End: 2021-04-30

## 2021-05-03 ENCOUNTER — APPOINTMENT (OUTPATIENT)
Dept: GENERAL RADIOLOGY | Age: 51
End: 2021-05-03
Attending: PAIN MEDICINE
Payer: MEDICAID

## 2021-05-03 ENCOUNTER — ANESTHESIA EVENT (OUTPATIENT)
Dept: OPERATING ROOM | Age: 51
End: 2021-05-03
Payer: MEDICAID

## 2021-05-03 ENCOUNTER — HOSPITAL ENCOUNTER (OUTPATIENT)
Age: 51
Setting detail: OUTPATIENT SURGERY
Discharge: HOME | End: 2021-05-03
Attending: PAIN MEDICINE | Admitting: PAIN MEDICINE
Payer: MEDICAID

## 2021-05-03 ENCOUNTER — ANESTHESIA (OUTPATIENT)
Dept: OPERATING ROOM | Age: 51
End: 2021-05-03
Payer: MEDICAID

## 2021-05-03 VITALS
TEMPERATURE: 97.4 F | SYSTOLIC BLOOD PRESSURE: 105 MMHG | HEART RATE: 68 BPM | DIASTOLIC BLOOD PRESSURE: 61 MMHG | HEIGHT: 71 IN | OXYGEN SATURATION: 95 % | WEIGHT: 186.6 LBS | BODY MASS INDEX: 26.12 KG/M2 | RESPIRATION RATE: 18 BRPM

## 2021-05-03 VITALS
RESPIRATION RATE: 19 BRPM | DIASTOLIC BLOOD PRESSURE: 67 MMHG | OXYGEN SATURATION: 96 % | SYSTOLIC BLOOD PRESSURE: 120 MMHG

## 2021-05-03 PROCEDURE — 6360000002 HC RX W HCPCS: Performed by: NURSE ANESTHETIST, CERTIFIED REGISTERED

## 2021-05-03 PROCEDURE — 2709999900 HC NON-CHARGEABLE SUPPLY: Performed by: PAIN MEDICINE

## 2021-05-03 PROCEDURE — 64493 INJ PARAVERT F JNT L/S 1 LEV: CPT | Performed by: PAIN MEDICINE

## 2021-05-03 PROCEDURE — 3700000000 HC ANESTHESIA ATTENDED CARE: Performed by: PAIN MEDICINE

## 2021-05-03 PROCEDURE — 7100000011 HC PHASE II RECOVERY - ADDTL 15 MIN: Performed by: PAIN MEDICINE

## 2021-05-03 PROCEDURE — 3209999900 FLUORO FOR SURGICAL PROCEDURES

## 2021-05-03 PROCEDURE — 64494 INJ PARAVERT F JNT L/S 2 LEV: CPT | Performed by: PAIN MEDICINE

## 2021-05-03 PROCEDURE — 3600000054 HC PAIN LEVEL 3 BASE: Performed by: PAIN MEDICINE

## 2021-05-03 PROCEDURE — 6360000002 HC RX W HCPCS: Performed by: PAIN MEDICINE

## 2021-05-03 PROCEDURE — 2500000003 HC RX 250 WO HCPCS: Performed by: PAIN MEDICINE

## 2021-05-03 PROCEDURE — 7100000010 HC PHASE II RECOVERY - FIRST 15 MIN: Performed by: PAIN MEDICINE

## 2021-05-03 RX ORDER — METHYLPREDNISOLONE ACETATE 80 MG/ML
INJECTION, SUSPENSION INTRA-ARTICULAR; INTRALESIONAL; INTRAMUSCULAR; SOFT TISSUE PRN
Status: DISCONTINUED | OUTPATIENT
Start: 2021-05-03 | End: 2021-05-03 | Stop reason: ALTCHOICE

## 2021-05-03 RX ORDER — BUPIVACAINE HYDROCHLORIDE 2.5 MG/ML
INJECTION, SOLUTION EPIDURAL; INFILTRATION; INTRACAUDAL PRN
Status: DISCONTINUED | OUTPATIENT
Start: 2021-05-03 | End: 2021-05-03 | Stop reason: ALTCHOICE

## 2021-05-03 RX ORDER — LIDOCAINE HYDROCHLORIDE 10 MG/ML
INJECTION, SOLUTION INFILTRATION; PERINEURAL PRN
Status: DISCONTINUED | OUTPATIENT
Start: 2021-05-03 | End: 2021-05-03 | Stop reason: ALTCHOICE

## 2021-05-03 RX ORDER — PROPOFOL 10 MG/ML
INJECTION, EMULSION INTRAVENOUS PRN
Status: DISCONTINUED | OUTPATIENT
Start: 2021-05-03 | End: 2021-05-03 | Stop reason: SDUPTHER

## 2021-05-03 RX ADMIN — PROPOFOL 50 MG: 10 INJECTION, EMULSION INTRAVENOUS at 09:49

## 2021-05-03 RX ADMIN — PROPOFOL 50 MG: 10 INJECTION, EMULSION INTRAVENOUS at 09:46

## 2021-05-03 ASSESSMENT — PULMONARY FUNCTION TESTS
PIF_VALUE: 0

## 2021-05-03 ASSESSMENT — PAIN - FUNCTIONAL ASSESSMENT: PAIN_FUNCTIONAL_ASSESSMENT: 0-10

## 2021-05-03 ASSESSMENT — LIFESTYLE VARIABLES: SMOKING_STATUS: 1

## 2021-05-03 NOTE — PROGRESS NOTES
6120 Patient to Phase 2, very drowsy, does not open eyes to name. Report received from Heather, [facility]. Vitals obtained, stable. 1003 Patient arouses to name, snack and drink provided. 1008 Patient calling his ride, instructed to tell them to pull up to doors in approx 5 mins. 1013 IV taken out and patient instructed to get dressed. 1017 Patient discharged, walked out with this RN. Patient lef [employer] cell phone and all belongings.

## 2021-05-03 NOTE — H&P
[facility]  History and Physical Update    Pt Name: Vanessa Ulloa  MRN: [id number]  YOB: 1970  Date of evaluation: 5/3/2021      I have examined the patient and reviewed the H&P/Consult and there are no changes to the patient or plans.         Electronically signed by Rosa Maria Oswald MD on 5/3/2021 at 8:55 AM

## 2021-05-03 NOTE — ANESTHESIA PRE PROCEDURE
Department of Anesthesiology  Preprocedure Note       Name:  Kerrin Bernheim   Age:  45 y.o.  :  1970                                          MRN:  [id number]         Date:  5/3/2021      Surgeon: Patricia Caro):  Pamela Menendez MD    Procedure: Procedure(s):  bilateral L-facet MBB # 1 @ L3-4, L4-5 Only first two levels are approved [id number], [id number]    Medications prior to admission:   Prior to Admission medications    Medication Sig Start Date End Date Taking? Authorizing Provider   etodolac (LODINE) 400 MG tablet TAKE 1 TABLET BY MOUTH THREE TIMES A DAY 21   ANGELINA Lai CNP   traZODone (DESYREL) 50 MG tablet Take 50 mg by mouth nightly  21   Historical Provider, MD   gabapentin (NEURONTIN) 300 MG capsule Take 1 capsule by mouth 3 times daily for 30 days. 3/29/21 4/28/21  ANGELINA Lai CNP   Elastic Bandages & Supports (LUMBAR BACK BRACE/SUPPORT PAD) MISC 1 each by Does not apply route daily as needed (for pain and lumbar support) 12/10/20   Barbara L Kiach, APRN - CNP   baclofen (LIORESAL) 10 MG tablet Take 10 mg by mouth 3 times daily 20   Historical Provider, MD   tamsulosin (FLOMAX) 0.4 MG capsule Take 0.4 mg by mouth daily 20   Historical Provider, MD   zolpidem (AMBIEN) 5 MG tablet Take 5 mg by mouth once. 10/6/20   Historical Provider, MD       Current medications:    No current facility-administered medications for this encounter.         Allergies:  No Known Allergies    Problem List:    Patient Active Problem List   Diagnosis Code    Lumbar radiculopathy M54.16    Spinal stenosis of lumbar region without neurogenic claudication M48.061       Past Medical History:        Diagnosis Date    Cerebral artery occlusion with cerebral infarction ([facility] [address]     DDD (degenerative disc disease), lumbar     Hyperlipidemia     Hypertension     Kidney stones     Lumbago        Past Surgical History:        Procedure Laterality Date    COLONOSCOPY      LITHOTRIPSY  PAIN MANAGEMENT PROCEDURE N/A 1/5/2021    LESI L5 #1 performed by Rusty Miramontes MD at [place] [address] N/A 3/26/2021    LESI # 2 @ L4 performed by Rusty Miramontes MD at [facility]  2017       Social History:    Social History     Tobacco Use    Smoking status: Current Every Day Smoker     Packs/day: 0.05     Types: Cigarettes    Smokeless tobacco: Never Used   Substance Use Topics    Alcohol use: Yes     Comment: occasional                                Ready to quit: Not Answered  Counseling given: Not Answered      Vital Signs (Current): There were no vitals filed for this visit. BP Readings from Last 3 Encounters:   04/21/21 138/70   04/12/21 124/70   04/07/21 124/72       NPO Status:                                                                                 BMI:   Wt Readings from Last 3 Encounters:   04/21/21 181 lb (82.1 kg)   04/12/21 183 lb (83 kg)   04/07/21 183 lb 4.8 oz (83.1 kg)     There is no height or weight on file to calculate BMI.    CBC:   Lab Results   Component Value Date    WBC 10.0 03/10/2021    RBC 4.76 03/10/2021    HGB 14.3 03/10/2021    HCT 45.1 03/10/2021    MCV 94.7 03/10/2021    RDW 12.6 03/10/2021     03/10/2021       CMP:   Lab Results   Component Value Date     03/10/2021    K 4.6 03/10/2021     03/10/2021    CO2 23 03/10/2021    BUN 12 03/10/2021    CREATININE 1.10 03/10/2021    GFRAA >60 03/10/2021    LABGLOM >60 03/10/2021    GLUCOSE 72 03/10/2021    PROT 6.7 03/10/2021    CALCIUM 9.2 03/10/2021    BILITOT 1.04 03/10/2021    ALKPHOS 62 03/10/2021    AST 17 03/10/2021    ALT 13 03/10/2021       POC Tests: No results for input(s): POCGLU, POCNA, POCK, POCCL, POCBUN, POCHEMO, POCHCT in the last 72 hours.     Coags:   Lab Results   Component Value Date    INR 1.03 10/29/2020       HCG (If Applicable): No results found for: PREGTESTUR, PREGSERUM, HCG, HCGQUANT     ABGs: No results found for: PHART, PO2ART, HDM0TUS, ZKD4QJI, BEART, P4OELOYS     Type & Screen (If Applicable):  No results found for: LABABO, LABRH    Drug/Infectious Status (If Applicable):  No results found for: HIV, HEPCAB    COVID-19 Screening (If Applicable): No results found for: COVID19        Anesthesia Evaluation  Patient summary reviewed and Nursing notes reviewed no history of anesthetic complications:   Airway: Mallampati: II  TM distance: >3 FB   Neck ROM: full  Mouth opening: > = 3 FB Dental:          Pulmonary:Negative Pulmonary ROS and normal exam  breath sounds clear to auscultation  (+) current smoker          Patient did not smoke on day of surgery. Cardiovascular:  Exercise tolerance: good (>4 METS),   (+) hypertension:,                   Neuro/Psych:   (+) CVA:, neuromuscular disease:,             GI/Hepatic/Renal: Neg GI/Hepatic/Renal ROS            Endo/Other: Negative Endo/Other ROS             Pt had no PAT visit       Abdominal:           Vascular: negative vascular ROS. Anesthesia Plan      MAC     ASA 3       Induction: intravenous. Anesthetic plan and risks discussed with patient. Plan discussed with CRNA.                   [address] Pandey, DO   5/3/2021

## 2021-05-03 NOTE — H&P
H&P    FU from [facility]ole Mkceon # 2 from 3/26/2021. Again only received 1 days of relief from LESI # 2. Continues to have pain in low back radiating down bilateral legs posterior to toes- pain in lower back is \"electrical charge, burning, aching\" and in legs - tearing, and burning. \"It feels like there is a fish hook in my back and when I walk someone is tugging on it\".      Pain remains more bothersome in legs. Pain is 60% bothersome in legs compared to 40% in the back   Neurontin does help   Medications reviewed. Patient denies side effects with medications. Patient states he is taking medications as prescribed. Hedenies receiving pain medications from other sources. He denies any ER visits since last visit.     Pain scale with out pain medications or at its worst is 7-8/10. Last dose of Neuorntin was today  Drug screen reviewed from 10/19/2020 and was + for [facility]     The patienthas No Known Allergies.        Subjective:      Review of Systems   Constitutional: Negative. Musculoskeletal: Positive for arthralgias, back pain and myalgias. Neurological: Positive for weakness and numbness. Psychiatric/Behavioral: Positive for sleep disturbance. The patient is nervous/anxious.          Objective:      Vitals       Vitals:     04/12/21 1140   BP: 124/70   Weight: 183 lb (83 kg)   Height: 5' 11\" (1.803 m)            Physical Exam  Vitals signs reviewed. Constitutional:       General: He is not in acute distress. Appearance: He is well-developed. He is not diaphoretic. HENT:      Head: Normocephalic and atraumatic. Not macrocephalic and not microcephalic. Right Ear: External ear normal.      Left Ear: External ear normal.      Mouth/Throat:      Mouth: Mucous membranes are moist.   Eyes:      General:         Right eye: No discharge. Left eye: No discharge. Conjunctiva/sclera: Conjunctivae normal.   Neck:      Musculoskeletal: Muscular tenderness present. Trachea: No tracheal deviation. Cardiovascular:      Rate and Rhythm: Normal rate and regular rhythm. Pulses: Normal pulses. Heart sounds: Normal heart sounds. Pulmonary:      Effort: Pulmonary effort is normal. No respiratory distress. Abdominal:      General: Abdomen is flat. Palpations: Abdomen is soft. Musculoskeletal:         General: Tenderness present. Lumbar back: He exhibits decreased range of motion, tenderness, pain and spasm. Back:       Right upper leg: He exhibits tenderness and bony tenderness. Left upper leg: He exhibits tenderness and bony tenderness. Right lower leg: He exhibits tenderness and bony tenderness. Left lower leg: He exhibits tenderness and bony tenderness. Legs:    Skin:     General: Skin is warm and dry. Coloration: Skin is not pale. Findings: No rash. Neurological:      Mental Status: He is alert and oriented to person, place, and time. Cranial Nerves: No cranial nerve deficit. Sensory: Sensory deficit present. Motor: Weakness present. Comments: 4/5 bilateral lower extremity   + Bilateral leg SLR at 60 degrees    Psychiatric:         Attention and Perception: He is attentive. Speech: Speech normal.         Behavior: Behavior normal.         Thought Content: Thought content normal.         Judgment: Judgment normal.         GUMARO test: + bilaterally   Yeomans test: + bilaterally   Gaenslen test: + bilaterally   Assessment:      1. Spondylosis of lumbar region without myelopathy or radiculopathy    2. Facet arthropathy of spine    3. Lumbosacral radiculitis    4. Lumbar stenosis with neurogenic claudication    5. Lumbar foraminal stenosis    6. Annular tear of lumbar disc    7. Lumbar pain    8. Chronic pain syndrome       Plan:      · OARRS reviewed. Current MED: 0  · Patient was not offered naloxone for home. · Discussed long term side effects of medications, tolerance, dependency and addiction.   · Previous UDS reviewed  · UDS preformed today for compliance. · Patient told can not receive any pain medications from any other source. · No evidence of abuse, diversion or aberrant behavior. · Medications and/or procedures to improve function and quality of life- patient understanding with this and that may not be pain free  · Discussed with patient about safe storage of medications at home  · Discussed possible weaning of medication dosing dependent on treatment/procedure results. · Discussed with patient about risks with procedure including infection, reaction to medication, increased pain, or bleeding. · Procedure notes reveiwed in detail  · Only one day a relief from LESI X 2.   · Reviewed L-MRI and CT scan   · Plan bilateral L-facet MBB # 1 @ L3-4, L4-5, and L5-S1 for diagnostic purpose. Procedure and risks discussed in detail with patient. · Ordered referral to Dr. Warden Yoo for bilateral lower extremity EMG. · Continue Neurontin 300 mg TID- filled 3/29/2021- still plenty         Meds.  Prescribed:     Encounter Medications    No orders of the defined types were placed in this encounter.         Return for bilateral L-facet MBB # 1 @ L3-4, L4-5, and L5-S1. , follow up after procedure.

## 2021-05-03 NOTE — OP NOTE
Operative Note    Pre-Procedure Note    Patient Name: Javid Murphy   YOB: 1970  Room/Bed: [address] [place] [address] [facility]  Medical Record Number: [id number]  Date: 5/3/21      Indication:  Lower back pain  Consent: On file. Vital Signs:   Vitals:    05/03/21 0854   BP: (!) 136/56   Pulse: 64   Resp: 16   Temp: 97.3 °F (36.3 °C)   SpO2: 94%       Pre-Sedation Documentation and Exam:   Vital signs have been reviewed (see flow sheet for vitals). Sedation/ Anesthesia Plan:   MAC    Patient is an appropriate candidate for plan of sedation: yes    Preoperative Diagnosis:   L-spondylosis    Postoperative Diagnosis:   As above    Procedure Performed:  Diagnostic/Confirmatory median branch blocks at the levels of L3-4 and L4-5 bilateral under fluoroscopic guidance # 1. Indication for the Procedure: The patient has a history of chronic low back pain that is not responding well to the conservative treatment. The patient's pain is mostly axial in nature. Pain is interfering with the activities of daily living. Physical examination revealed facet tenderness and facet loading is positive. The procedure and risks  were discussed with the patient and an informed consent was obtained. Procedure: Bilateral    Patient is placed in prone position and skin over  the back was prepped and draped in sterile manner. Then using fluoroscopy the junction of the transverse process of the vertebra with the superior process of the facet joint was observed and the view was optimized. The skin and deep tissues posterior were infiltrated with 10 ml of 1% lidocaine. Then a #22-gauge 3-1/2  inch spinal needle was introduced through the skin wheal under fluoroscopy guidance such that the tip of the needle lies at the junction of the transverse process with the superior processes of the facet joint.  Then after negative aspiration a total of 8 ml of 0.25% Marcaine and depomedrol  (total 80 mg) was injected through the needles in divided doses.  EBL-0  Patient's vital signs and neurological status remained stable through  the procedure and post procedural  Period. Patient was instructed to keep track of pain for next 24 hours. every hour and bring it with next visit. Patient tollerated the procedure well and was discharged home in stable condition.     Electronically signed by Kimmy Roach MD on 5/3/21 at 9:44 AM EDT

## 2021-05-04 ENCOUNTER — TELEPHONE (OUTPATIENT)
Dept: UROLOGY | Age: 51
End: 2021-05-04

## 2021-05-04 NOTE — TELEPHONE ENCOUNTER
Patient advised of the urine results. He has a hard time starting the stream and knows he has an enlarge prostate. He does have some burning. He denies fever or chills. He states he is retaining fluid in his hands and feet. Patient advised to update his PCP for fluid retention. Thank you.

## 2021-05-04 NOTE — TELEPHONE ENCOUNTER
----- Message from ANGELINA Waite CNP sent at 5/4/2021 10:19 AM EDT -----  Is his surgery scheduled?

## 2021-05-04 NOTE — TELEPHONE ENCOUNTER
Will hold off on antibiotics, if symptoms worsen please call.  Call when he is ready to schedule surgery

## 2021-05-10 RX ORDER — ETODOLAC 400 MG/1
TABLET, FILM COATED ORAL
Qty: 90 TABLET | Refills: 0 | Status: SHIPPED | OUTPATIENT
Start: 2021-05-16 | End: 2021-06-04

## 2021-05-14 ENCOUNTER — PREP FOR PROCEDURE (OUTPATIENT)
Dept: UROLOGY | Age: 51
End: 2021-05-14

## 2021-05-14 ENCOUNTER — TELEPHONE (OUTPATIENT)
Dept: UROLOGY | Age: 51
End: 2021-05-14

## 2021-05-14 DIAGNOSIS — N13.8 HYPERTROPHY OF PROSTATE WITH URINARY OBSTRUCTION: Primary | ICD-10-CM

## 2021-05-14 DIAGNOSIS — Z01.818 PRE-OP TESTING: ICD-10-CM

## 2021-05-14 DIAGNOSIS — N40.1 HYPERTROPHY OF PROSTATE WITH URINARY OBSTRUCTION: Primary | ICD-10-CM

## 2021-05-14 RX ORDER — SODIUM CHLORIDE 9 MG/ML
INJECTION, SOLUTION INTRAVENOUS CONTINUOUS
Status: CANCELLED | OUTPATIENT
Start: 2021-05-26

## 2021-05-14 NOTE — TELEPHONE ENCOUNTER
Patient scheduled for surgery with Dr Sami Aragon on 5/26/21. Surgery consent on arrival. Patient to do pre op urine culture, ekg,chest xray on 5/17/21. Covid to be done on 5/19/21 which will be done at [facility]( # [phone], Fax # [phone]) Surgery instructions gone over verbally.

## 2021-05-14 NOTE — TELEPHONE ENCOUNTER
SURGERY [address] [facility] [address] [facility], [address]      Phone *[phone] *[phone]   Surgical Scheduling Direct Line Phone *[phone] Fax *[phone]      Sid Sandifer 1970 male    [address] [address] [address]   Marital Status:          Home Phone: [phone]      Cell Phone:    Telephone Information:   Mobile [phone]          Surgeon: Dr. Everardo Sawyer  Surgery Date: 5/26/21   Time: 10:30 am    Procedure: Cystoscopy,Transurethreal resection of the prostate     Diagnosis: Hypertrophy of prostate with urinary obstruction    Important Medical History: In [facility]    Special Inst/Equip:     CPT Codes:    33715,06814  Latex Allergy:  No     Cardiac Device:  No     Anesthesia:  Anesthesiologist (General/Spinal)          Admission Type:  Same Day                             Admit Prior to Day of Surgery: No    Case Location:  Main OR           Preadmission Testing:Phone Call              PAT Date and Time:______________________________________________________    PAT Confirmation #: ______________________________________________________    Post Op Visit: ___________________________________________________________    Need Preop Cardiac Clearance: No    Does Patient have Cardiologist/physician?      None    Surgery Confirmation #: __________________________________________________    : ________________________   Date: __________________________     Insurance Company Name: [employer]

## 2021-05-14 NOTE — TELEPHONE ENCOUNTER
DO NOT TAKE ASPIRIN, PLAVIX, FISH OIL, COUMADIN, IBUPROFEN, MOTRIN-LIKE DRUGS AND ANY MULTIVITAMINS OR OVER THE COUNTER SUPPLEMENTS 5 DAYS PRIOR TO SURGERY. Roy Darrell 1970 Diagnosis:    Surgical Physician: Dr. Elmira Severance have been scheduled for the procedure marked below:      Surgery: Cystoscopy, Transurethral resection of the prostate          Date: 5/26/21     Anesthesia: Anesthesiologist (General/Spinal)     Place of Service: [address] [place] [address] Second Floor Same Day Surgery           Arrive to same day surgery by:  8:30 am  (Surgery time is subject to change)        INSTRUCTIONS AS MARKED BELOW:    1.  DO NOT eat or drink anything after midnight before surgery. 2.  We prefer you shower or bathe with an antibacterial soap (Dial) the morning of surgery. 3.  Plan to spend the overnight   4. Please bring a current medication list, photo ID and insurance card(s) with you  5. Okay to take Tylenol  6. If you take Glucophage, Metformin or Janumet, hold 48-hours prior to surgery  7. Take blood pressure or heart medication as directed, if taken in the morning take with a small sip of water  8. The office will call you in 1-2 days after your procedure to schedule a follow up. DATE SENSITIVE TESTING    1. Do the pre op urine culture,chest xray,and ekg on 5/17/21. Order faxed  2. Do the Covid 19 test on 5/19/21. Order faxed.       (Covid-19 screening is date sensitive and can only be done 5 to 7 days prior to your procedure)    Date: 5/14/2021

## 2021-05-18 ENCOUNTER — TELEPHONE (OUTPATIENT)
Dept: UROLOGY | Age: 51
End: 2021-05-18

## 2021-05-18 RX ORDER — AMPICILLIN 500 MG/1
500 CAPSULE ORAL 4 TIMES DAILY
Qty: 40 CAPSULE | Refills: 0 | Status: SHIPPED | OUTPATIENT
Start: 2021-05-18 | End: 2021-05-28

## 2021-05-18 NOTE — TELEPHONE ENCOUNTER
Pt's urine significant for infection with Enterococcus. Script for Ampicillin sent to pharmacy. Please notify pt to start antibiotic and take to completion.

## 2021-05-19 RX ORDER — GABAPENTIN 300 MG/1
300 CAPSULE ORAL 3 TIMES DAILY
COMMUNITY
End: 2021-05-20 | Stop reason: SDUPTHER

## 2021-05-19 NOTE — PROGRESS NOTES
Following instructions given to patient, who states understanding:    NPO after midnight  [employer] and 's license  Wear comfortable clean clothing  Do not bring jewelry   Shower night before and morning of surgery with a liquid antibacterial soap  Bring medications in original bottles  Follow all instructions given by your physician   needed at discharge  Call [phone] for any questions  Report to SDS on 2nd floor  If you would become ill prior to surgery, please call the surgeon  May have a visitor with you, we request that you limit to 2 visitors in pre-op area  Please bring and wear mask  You will be receiving a phone call one or two days before surgery to review covid screening exposure

## 2021-05-19 NOTE — PROGRESS NOTES
In preparation for their surgical procedure above patient was screened for Obstructive Sleep Apnea (URI) using the STOP-Bang Questionnaire by the Pre-Admission Testing department. This is a pre-surgical screening tool for patient safety and serves as a recommendation, this WILL NOT cause cancellation of surgery. STOP-Bang Questionnaire  * Do you currently see a pulmonologist?  No     If yes STOP, do not complete. Patient follows with Dr.     1.  Do you snore loudly (able to be heard in the next room)? No    2. Do you often feel tired or sleepy during the daytime? No       3. Has anyone ever told you that you stop breathing during your sleep? No    4. Do you have or are you being treated for high blood pressure? No      5. BMI more than 35? BMI (Calculated): 25.2        No    6. Age over 48 years? 45 y.o. Yes    7. Neck Circumference greater than 17 inches for male or 16 inches for female? Measured           (visits only)            Not Applicable    8. Gender Male? Yes      TOTAL SCORE: 2    URI - Low Risk : Yes to 0 - 2 questions  URI - Intermediate Risk : Yes to 3 - 4 questions  URI - High Risk : Yes to 5 - 8 questions    Adapted from:   STOP Questionnaire: A Tool to Screen Patients for Obstructive Sleep Apnea   Raydell Paget, F.R.C.P.C., Rigo Escalante M.B.B.S., Arlet Moody M.D., Leesa Monday. Jeannie Malone, Ph.D., Jason Cintron M.B.B.S., Hai Yepez, M.Sc., Mo Rawls M.D., Dorenda Appl. ELLIE Piña.P.C.    Anesthesiology 2008; 215:647-41 Copyright 2008, the [address] of [employer]est[employer], [address].   ----------------------------------------------------------------------------------------------------------------

## 2021-05-19 NOTE — PROGRESS NOTES
States he had covid test done 5/19 at [facility]  Covid screening questionnaire complete and negative for symptoms or exposure see chart for documentation.   Please bring vaccine card if you have had both covid shots  Please limit your exposure to the public after you have your covid test  Please call your doctor immediately if you develop any symptoms of covid prior to your surgery

## 2021-05-20 ENCOUNTER — OFFICE VISIT (OUTPATIENT)
Dept: PHYSICAL MEDICINE AND REHAB | Age: 51
End: 2021-05-20
Payer: MEDICAID

## 2021-05-20 ENCOUNTER — TELEPHONE (OUTPATIENT)
Dept: UROLOGY | Age: 51
End: 2021-05-20

## 2021-05-20 VITALS
DIASTOLIC BLOOD PRESSURE: 64 MMHG | WEIGHT: 180 LBS | BODY MASS INDEX: 25.2 KG/M2 | SYSTOLIC BLOOD PRESSURE: 124 MMHG | HEART RATE: 70 BPM | HEIGHT: 71 IN

## 2021-05-20 DIAGNOSIS — M47.819 FACET ARTHROPATHY OF SPINE: ICD-10-CM

## 2021-05-20 DIAGNOSIS — M48.062 LUMBAR STENOSIS WITH NEUROGENIC CLAUDICATION: ICD-10-CM

## 2021-05-20 DIAGNOSIS — M51.36 ANNULAR TEAR OF LUMBAR DISC: ICD-10-CM

## 2021-05-20 DIAGNOSIS — M54.50 LUMBAR PAIN: ICD-10-CM

## 2021-05-20 DIAGNOSIS — M48.061 LUMBAR FORAMINAL STENOSIS: ICD-10-CM

## 2021-05-20 DIAGNOSIS — M47.816 SPONDYLOSIS OF LUMBAR REGION WITHOUT MYELOPATHY OR RADICULOPATHY: Primary | ICD-10-CM

## 2021-05-20 DIAGNOSIS — M54.17 LUMBOSACRAL RADICULITIS: ICD-10-CM

## 2021-05-20 DIAGNOSIS — G89.4 CHRONIC PAIN SYNDROME: ICD-10-CM

## 2021-05-20 PROCEDURE — 3017F COLORECTAL CA SCREEN DOC REV: CPT | Performed by: NURSE PRACTITIONER

## 2021-05-20 PROCEDURE — G8427 DOCREV CUR MEDS BY ELIG CLIN: HCPCS | Performed by: NURSE PRACTITIONER

## 2021-05-20 PROCEDURE — 99213 OFFICE O/P EST LOW 20 MIN: CPT | Performed by: NURSE PRACTITIONER

## 2021-05-20 PROCEDURE — 4004F PT TOBACCO SCREEN RCVD TLK: CPT | Performed by: NURSE PRACTITIONER

## 2021-05-20 PROCEDURE — G8419 CALC BMI OUT NRM PARAM NOF/U: HCPCS | Performed by: NURSE PRACTITIONER

## 2021-05-20 RX ORDER — DULOXETIN HYDROCHLORIDE 20 MG/1
20 CAPSULE, DELAYED RELEASE ORAL DAILY
COMMUNITY
End: 2021-11-04

## 2021-05-20 RX ORDER — GABAPENTIN 300 MG/1
300 CAPSULE ORAL 3 TIMES DAILY
Qty: 90 CAPSULE | Refills: 0 | Status: SHIPPED | OUTPATIENT
Start: 2021-05-20 | End: 2021-06-28 | Stop reason: SDUPTHER

## 2021-05-20 ASSESSMENT — ENCOUNTER SYMPTOMS: BACK PAIN: 1

## 2021-05-20 NOTE — PROGRESS NOTES
medications as prescribed. Hedenies receiving pain medications from other sources. He denies any ER visits since last visit. Pain scale with out pain medications or at its worst is 3-8/10. Last dose of Neurontin was today   Drug screen reviewed from 10/19/2020 and was + for [facility]    The patienthas No Known Allergies. Subjective:      Review of Systems   Constitutional: Negative. Genitourinary: Positive for difficulty urinating. Musculoskeletal: Positive for arthralgias, back pain and myalgias. Neurological: Positive for weakness and numbness. Psychiatric/Behavioral: Positive for sleep disturbance. The patient is nervous/anxious. Objective:     Vitals:    05/20/21 0947   BP: 124/64   Site: Left Upper Arm   Position: Sitting   Cuff Size: Medium Adult   Pulse: 70   Weight: 180 lb (81.6 kg)   Height: 5' 11\" (1.803 m)       Physical Exam  Vitals reviewed. Constitutional:       General: He is not in acute distress. Appearance: He is well-developed. He is not diaphoretic. HENT:      Head: Normocephalic and atraumatic. Not macrocephalic and not microcephalic. Right Ear: External ear normal.      Left Ear: External ear normal.      Mouth/Throat:      Mouth: Mucous membranes are moist.   Eyes:      General:         Right eye: No discharge. Left eye: No discharge. Conjunctiva/sclera: Conjunctivae normal.   Neck:      Trachea: No tracheal deviation. Cardiovascular:      Rate and Rhythm: Normal rate and regular rhythm. Pulses: Normal pulses. Heart sounds: Normal heart sounds. Pulmonary:      Effort: Pulmonary effort is normal. No respiratory distress. Abdominal:      General: Abdomen is flat. Palpations: Abdomen is soft. Musculoskeletal:         General: Tenderness present. Cervical back: Muscular tenderness present. Lumbar back: Spasms and tenderness present. Decreased range of motion.         Back:       Right upper leg: Tenderness and bony including infection, reaction to medication, increased pain, or bleeding. Procedure notes reveiwed in detail  Received 80% relief of pain across lower back pain for over 1 week from bilateral L-facet MBB # 1 with improvement in mobility. Plan bilateral L-facet MBB # 2 @ L3-4, L4-5 for diagnostic purpose. Procedure and risks discussed in detail with patient. States he has a prostates surgery/ procedure next week- may have to wait on procedure till healed. Will be off antibiotics in 4 days    Awaiting appointment with Dr. Liza Ortega for for bilateral lower extremity EMG- will need to check on status   Continue Neurontin 300 mg TID- ordered refill   Uses THC no opioids         Meds. Prescribed:   Orders Placed This Encounter   Medications    gabapentin (NEURONTIN) 300 MG capsule     Sig: Take 1 capsule by mouth 3 times daily for 30 days. Dispense:  90 capsule     Refill:  0       Return for  bilateral L-facet MBB # 2 @ L3-4, L4-5. , follow up after procedure.                Electronically signed by ANGELINA Gage CNP on5/20/2021 at 10:22 AM

## 2021-05-21 ENCOUNTER — PREP FOR PROCEDURE (OUTPATIENT)
Dept: UROLOGY | Age: 51
End: 2021-05-21

## 2021-05-26 ENCOUNTER — HOSPITAL ENCOUNTER (INPATIENT)
Age: 51
LOS: 1 days | Discharge: HOME | DRG: 482 | End: 2021-05-27
Attending: UROLOGY | Admitting: UROLOGY
Payer: MEDICAID

## 2021-05-26 ENCOUNTER — ANESTHESIA EVENT (OUTPATIENT)
Dept: OPERATING ROOM | Age: 51
DRG: 482 | End: 2021-05-26
Payer: MEDICAID

## 2021-05-26 ENCOUNTER — ANESTHESIA (OUTPATIENT)
Dept: OPERATING ROOM | Age: 51
DRG: 482 | End: 2021-05-26
Payer: MEDICAID

## 2021-05-26 ENCOUNTER — APPOINTMENT (OUTPATIENT)
Dept: GENERAL RADIOLOGY | Age: 51
DRG: 482 | End: 2021-05-26
Attending: UROLOGY
Payer: MEDICAID

## 2021-05-26 VITALS
DIASTOLIC BLOOD PRESSURE: 70 MMHG | SYSTOLIC BLOOD PRESSURE: 111 MMHG | OXYGEN SATURATION: 99 % | RESPIRATION RATE: 15 BRPM | TEMPERATURE: 98 F

## 2021-05-26 DIAGNOSIS — G89.18 POST-OP PAIN: Primary | ICD-10-CM

## 2021-05-26 PROBLEM — R31.9 HEMATURIA: Status: ACTIVE | Noted: 2021-05-26

## 2021-05-26 PROBLEM — N40.1 HYPERTROPHY OF PROSTATE WITH URINARY OBSTRUCTION: Status: ACTIVE | Noted: 2021-05-26

## 2021-05-26 PROBLEM — N13.8 HYPERTROPHY OF PROSTATE WITH URINARY OBSTRUCTION: Status: ACTIVE | Noted: 2021-05-26

## 2021-05-26 PROCEDURE — 7100000010 HC PHASE II RECOVERY - FIRST 15 MIN: Performed by: UROLOGY

## 2021-05-26 PROCEDURE — 71046 X-RAY EXAM CHEST 2 VIEWS: CPT

## 2021-05-26 PROCEDURE — 3700000001 HC ADD 15 MINUTES (ANESTHESIA): Performed by: UROLOGY

## 2021-05-26 PROCEDURE — 2580000003 HC RX 258: Performed by: UROLOGY

## 2021-05-26 PROCEDURE — 3600000003 HC SURGERY LEVEL 3 BASE: Performed by: UROLOGY

## 2021-05-26 PROCEDURE — G0378 HOSPITAL OBSERVATION PER HR: HCPCS

## 2021-05-26 PROCEDURE — 3600000013 HC SURGERY LEVEL 3 ADDTL 15MIN: Performed by: UROLOGY

## 2021-05-26 PROCEDURE — 6370000000 HC RX 637 (ALT 250 FOR IP): Performed by: UROLOGY

## 2021-05-26 PROCEDURE — 6360000002 HC RX W HCPCS

## 2021-05-26 PROCEDURE — 7100000000 HC PACU RECOVERY - FIRST 15 MIN: Performed by: UROLOGY

## 2021-05-26 PROCEDURE — 2720000010 HC SURG SUPPLY STERILE: Performed by: UROLOGY

## 2021-05-26 PROCEDURE — 88305 TISSUE EXAM BY PATHOLOGIST: CPT

## 2021-05-26 PROCEDURE — 6360000002 HC RX W HCPCS: Performed by: UROLOGY

## 2021-05-26 PROCEDURE — 6360000002 HC RX W HCPCS: Performed by: NURSE ANESTHETIST, CERTIFIED REGISTERED

## 2021-05-26 PROCEDURE — 6360000002 HC RX W HCPCS: Performed by: ANESTHESIOLOGY

## 2021-05-26 PROCEDURE — 2709999900 HC NON-CHARGEABLE SUPPLY: Performed by: UROLOGY

## 2021-05-26 PROCEDURE — 7100000001 HC PACU RECOVERY - ADDTL 15 MIN: Performed by: UROLOGY

## 2021-05-26 PROCEDURE — 1200000000 HC SEMI PRIVATE

## 2021-05-26 PROCEDURE — 7100000011 HC PHASE II RECOVERY - ADDTL 15 MIN: Performed by: UROLOGY

## 2021-05-26 PROCEDURE — 2500000003 HC RX 250 WO HCPCS: Performed by: NURSE ANESTHETIST, CERTIFIED REGISTERED

## 2021-05-26 PROCEDURE — 3700000000 HC ANESTHESIA ATTENDED CARE: Performed by: UROLOGY

## 2021-05-26 PROCEDURE — 0VB08ZZ EXCISION OF PROSTATE, VIA NATURAL OR ARTIFICIAL OPENING ENDOSCOPIC: ICD-10-PCS | Performed by: UROLOGY

## 2021-05-26 RX ORDER — PROMETHAZINE HYDROCHLORIDE 25 MG/ML
12.5 INJECTION, SOLUTION INTRAMUSCULAR; INTRAVENOUS
Status: DISCONTINUED | OUTPATIENT
Start: 2021-05-26 | End: 2021-05-26 | Stop reason: HOSPADM

## 2021-05-26 RX ORDER — HYDROCODONE BITARTRATE AND ACETAMINOPHEN 5; 325 MG/1; MG/1
1 TABLET ORAL EVERY 6 HOURS PRN
Qty: 15 TABLET | Refills: 0 | Status: SHIPPED | OUTPATIENT
Start: 2021-05-26 | End: 2021-05-31

## 2021-05-26 RX ORDER — ACETAMINOPHEN 325 MG/1
650 TABLET ORAL EVERY 4 HOURS PRN
Status: DISCONTINUED | OUTPATIENT
Start: 2021-05-26 | End: 2021-05-27 | Stop reason: HOSPADM

## 2021-05-26 RX ORDER — SODIUM CHLORIDE 9 MG/ML
25 INJECTION, SOLUTION INTRAVENOUS PRN
Status: DISCONTINUED | OUTPATIENT
Start: 2021-05-26 | End: 2021-05-27 | Stop reason: HOSPADM

## 2021-05-26 RX ORDER — FENTANYL CITRATE 50 UG/ML
25 INJECTION, SOLUTION INTRAMUSCULAR; INTRAVENOUS EVERY 5 MIN PRN
Status: DISCONTINUED | OUTPATIENT
Start: 2021-05-26 | End: 2021-05-26 | Stop reason: HOSPADM

## 2021-05-26 RX ORDER — ONDANSETRON 2 MG/ML
INJECTION INTRAMUSCULAR; INTRAVENOUS PRN
Status: DISCONTINUED | OUTPATIENT
Start: 2021-05-26 | End: 2021-05-26 | Stop reason: SDUPTHER

## 2021-05-26 RX ORDER — TAMSULOSIN HYDROCHLORIDE 0.4 MG/1
0.4 CAPSULE ORAL DAILY
Status: DISCONTINUED | OUTPATIENT
Start: 2021-05-26 | End: 2021-05-27 | Stop reason: HOSPADM

## 2021-05-26 RX ORDER — HYDROCODONE BITARTRATE AND ACETAMINOPHEN 5; 325 MG/1; MG/1
1 TABLET ORAL EVERY 4 HOURS PRN
Status: DISCONTINUED | OUTPATIENT
Start: 2021-05-26 | End: 2021-05-27 | Stop reason: HOSPADM

## 2021-05-26 RX ORDER — DULOXETIN HYDROCHLORIDE 20 MG/1
20 CAPSULE, DELAYED RELEASE ORAL DAILY
Status: DISCONTINUED | OUTPATIENT
Start: 2021-05-26 | End: 2021-05-27 | Stop reason: HOSPADM

## 2021-05-26 RX ORDER — ATORVASTATIN CALCIUM 20 MG/1
20 TABLET, FILM COATED ORAL DAILY
COMMUNITY

## 2021-05-26 RX ORDER — SODIUM CHLORIDE 9 MG/ML
INJECTION, SOLUTION INTRAVENOUS CONTINUOUS
Status: DISCONTINUED | OUTPATIENT
Start: 2021-05-26 | End: 2021-05-27 | Stop reason: HOSPADM

## 2021-05-26 RX ORDER — HYDROCODONE BITARTRATE AND ACETAMINOPHEN 5; 325 MG/1; MG/1
1 TABLET ORAL ONCE
Status: COMPLETED | OUTPATIENT
Start: 2021-05-26 | End: 2021-05-26

## 2021-05-26 RX ORDER — ONDANSETRON 2 MG/ML
4 INJECTION INTRAMUSCULAR; INTRAVENOUS EVERY 6 HOURS PRN
Status: DISCONTINUED | OUTPATIENT
Start: 2021-05-26 | End: 2021-05-27 | Stop reason: HOSPADM

## 2021-05-26 RX ORDER — SODIUM CHLORIDE 0.9 % (FLUSH) 0.9 %
5-40 SYRINGE (ML) INJECTION PRN
Status: DISCONTINUED | OUTPATIENT
Start: 2021-05-26 | End: 2021-05-27 | Stop reason: HOSPADM

## 2021-05-26 RX ORDER — SODIUM CHLORIDE 0.9 % (FLUSH) 0.9 %
5-40 SYRINGE (ML) INJECTION EVERY 12 HOURS SCHEDULED
Status: DISCONTINUED | OUTPATIENT
Start: 2021-05-26 | End: 2021-05-27 | Stop reason: HOSPADM

## 2021-05-26 RX ORDER — BACLOFEN 10 MG/1
10 TABLET ORAL 3 TIMES DAILY
Status: DISCONTINUED | OUTPATIENT
Start: 2021-05-26 | End: 2021-05-27 | Stop reason: HOSPADM

## 2021-05-26 RX ORDER — DEXAMETHASONE SODIUM PHOSPHATE 10 MG/ML
INJECTION, EMULSION INTRAMUSCULAR; INTRAVENOUS PRN
Status: DISCONTINUED | OUTPATIENT
Start: 2021-05-26 | End: 2021-05-26 | Stop reason: SDUPTHER

## 2021-05-26 RX ORDER — ONDANSETRON 4 MG/1
4 TABLET, ORALLY DISINTEGRATING ORAL EVERY 8 HOURS PRN
Status: DISCONTINUED | OUTPATIENT
Start: 2021-05-26 | End: 2021-05-27 | Stop reason: HOSPADM

## 2021-05-26 RX ORDER — PROPOFOL 10 MG/ML
INJECTION, EMULSION INTRAVENOUS PRN
Status: DISCONTINUED | OUTPATIENT
Start: 2021-05-26 | End: 2021-05-26 | Stop reason: SDUPTHER

## 2021-05-26 RX ORDER — HYDROCODONE BITARTRATE AND ACETAMINOPHEN 5; 325 MG/1; MG/1
2 TABLET ORAL EVERY 4 HOURS PRN
Status: DISCONTINUED | OUTPATIENT
Start: 2021-05-26 | End: 2021-05-27 | Stop reason: HOSPADM

## 2021-05-26 RX ORDER — ACYCLOVIR 200 MG/1
400 CAPSULE ORAL 2 TIMES DAILY
Qty: 40 CAPSULE | Refills: 0 | Status: SHIPPED | OUTPATIENT
Start: 2021-05-26 | End: 2021-06-05

## 2021-05-26 RX ORDER — ATORVASTATIN CALCIUM 20 MG/1
20 TABLET, FILM COATED ORAL DAILY
Status: DISCONTINUED | OUTPATIENT
Start: 2021-05-26 | End: 2021-05-27 | Stop reason: HOSPADM

## 2021-05-26 RX ORDER — FENTANYL CITRATE 50 UG/ML
50 INJECTION, SOLUTION INTRAMUSCULAR; INTRAVENOUS EVERY 5 MIN PRN
Status: DISCONTINUED | OUTPATIENT
Start: 2021-05-26 | End: 2021-05-26 | Stop reason: HOSPADM

## 2021-05-26 RX ORDER — GABAPENTIN 300 MG/1
300 CAPSULE ORAL 3 TIMES DAILY
Status: DISCONTINUED | OUTPATIENT
Start: 2021-05-26 | End: 2021-05-27 | Stop reason: HOSPADM

## 2021-05-26 RX ORDER — MIDAZOLAM HYDROCHLORIDE 1 MG/ML
INJECTION INTRAMUSCULAR; INTRAVENOUS PRN
Status: DISCONTINUED | OUTPATIENT
Start: 2021-05-26 | End: 2021-05-26 | Stop reason: SDUPTHER

## 2021-05-26 RX ORDER — FENTANYL CITRATE 50 UG/ML
INJECTION, SOLUTION INTRAMUSCULAR; INTRAVENOUS PRN
Status: DISCONTINUED | OUTPATIENT
Start: 2021-05-26 | End: 2021-05-26 | Stop reason: SDUPTHER

## 2021-05-26 RX ORDER — TRAZODONE HYDROCHLORIDE 50 MG/1
50 TABLET ORAL NIGHTLY
Status: DISCONTINUED | OUTPATIENT
Start: 2021-05-26 | End: 2021-05-27 | Stop reason: HOSPADM

## 2021-05-26 RX ORDER — IBUPROFEN 400 MG/1
400 TABLET ORAL
Status: DISCONTINUED | OUTPATIENT
Start: 2021-05-26 | End: 2021-05-27 | Stop reason: HOSPADM

## 2021-05-26 RX ORDER — CEPHALEXIN 500 MG/1
500 CAPSULE ORAL 3 TIMES DAILY
Qty: 9 CAPSULE | Refills: 0 | Status: SHIPPED | OUTPATIENT
Start: 2021-05-26 | End: 2021-05-29

## 2021-05-26 RX ORDER — LABETALOL 20 MG/4 ML (5 MG/ML) INTRAVENOUS SYRINGE
10 EVERY 10 MIN PRN
Status: DISCONTINUED | OUTPATIENT
Start: 2021-05-26 | End: 2021-05-26 | Stop reason: HOSPADM

## 2021-05-26 RX ORDER — LIDOCAINE HCL/PF 100 MG/5ML
SYRINGE (ML) INJECTION PRN
Status: DISCONTINUED | OUTPATIENT
Start: 2021-05-26 | End: 2021-05-26 | Stop reason: SDUPTHER

## 2021-05-26 RX ADMIN — HYDROCODONE BITARTRATE AND ACETAMINOPHEN 1 TABLET: 5; 325 TABLET ORAL at 14:37

## 2021-05-26 RX ADMIN — HYDROMORPHONE HYDROCHLORIDE 0.5 MG: 1 INJECTION, SOLUTION INTRAMUSCULAR; INTRAVENOUS; SUBCUTANEOUS at 13:47

## 2021-05-26 RX ADMIN — HYDROMORPHONE HYDROCHLORIDE 0.5 MG: 1 INJECTION, SOLUTION INTRAMUSCULAR; INTRAVENOUS; SUBCUTANEOUS at 13:41

## 2021-05-26 RX ADMIN — Medication 90 MG: at 12:08

## 2021-05-26 RX ADMIN — ONDANSETRON HYDROCHLORIDE 4 MG: 4 INJECTION, SOLUTION INTRAMUSCULAR; INTRAVENOUS at 12:11

## 2021-05-26 RX ADMIN — HYDROCODONE BITARTRATE AND ACETAMINOPHEN 1 TABLET: 5; 325 TABLET ORAL at 23:40

## 2021-05-26 RX ADMIN — HYDROCODONE BITARTRATE AND ACETAMINOPHEN 2 TABLET: 5; 325 TABLET ORAL at 19:29

## 2021-05-26 RX ADMIN — ATORVASTATIN CALCIUM 20 MG: 20 TABLET, FILM COATED ORAL at 19:30

## 2021-05-26 RX ADMIN — FENTANYL CITRATE 50 MCG: 50 INJECTION, SOLUTION INTRAMUSCULAR; INTRAVENOUS at 13:19

## 2021-05-26 RX ADMIN — MIDAZOLAM 2 MG: 1 INJECTION INTRAMUSCULAR; INTRAVENOUS at 12:06

## 2021-05-26 RX ADMIN — DEXAMETHASONE SODIUM PHOSPHATE 10 MG: 10 INJECTION, EMULSION INTRAMUSCULAR; INTRAVENOUS at 12:13

## 2021-05-26 RX ADMIN — FENTANYL CITRATE 100 MCG: 50 INJECTION, SOLUTION INTRAMUSCULAR; INTRAVENOUS at 12:08

## 2021-05-26 RX ADMIN — BACLOFEN 10 MG: 10 TABLET ORAL at 22:43

## 2021-05-26 RX ADMIN — FENTANYL CITRATE 50 MCG: 50 INJECTION, SOLUTION INTRAMUSCULAR; INTRAVENOUS at 13:32

## 2021-05-26 RX ADMIN — PROPOFOL 50 MG: 10 INJECTION, EMULSION INTRAVENOUS at 12:11

## 2021-05-26 RX ADMIN — SODIUM CHLORIDE: 9 INJECTION, SOLUTION INTRAVENOUS at 09:44

## 2021-05-26 RX ADMIN — TAMSULOSIN HYDROCHLORIDE 0.4 MG: 0.4 CAPSULE ORAL at 19:30

## 2021-05-26 RX ADMIN — DULOXETINE HYDROCHLORIDE 20 MG: 20 CAPSULE, DELAYED RELEASE ORAL at 19:30

## 2021-05-26 RX ADMIN — PROPOFOL 150 MG: 10 INJECTION, EMULSION INTRAVENOUS at 12:08

## 2021-05-26 RX ADMIN — IBUPROFEN 400 MG: 400 TABLET, FILM COATED ORAL at 22:43

## 2021-05-26 RX ADMIN — GABAPENTIN 300 MG: 300 CAPSULE ORAL at 19:30

## 2021-05-26 RX ADMIN — TRAZODONE HYDROCHLORIDE 50 MG: 50 TABLET ORAL at 22:44

## 2021-05-26 RX ADMIN — CEFAZOLIN 2000 MG: 10 INJECTION, POWDER, FOR SOLUTION INTRAVENOUS at 12:12

## 2021-05-26 ASSESSMENT — PAIN SCALES - GENERAL
PAINLEVEL_OUTOF10: 3
PAINLEVEL_OUTOF10: 5
PAINLEVEL_OUTOF10: 4
PAINLEVEL_OUTOF10: 3
PAINLEVEL_OUTOF10: 2
PAINLEVEL_OUTOF10: 5
PAINLEVEL_OUTOF10: 7
PAINLEVEL_OUTOF10: 4
PAINLEVEL_OUTOF10: 4
PAINLEVEL_OUTOF10: 6
PAINLEVEL_OUTOF10: 8
PAINLEVEL_OUTOF10: 7
PAINLEVEL_OUTOF10: 4

## 2021-05-26 ASSESSMENT — PAIN DESCRIPTION - ORIENTATION: ORIENTATION: MID

## 2021-05-26 ASSESSMENT — PULMONARY FUNCTION TESTS
PIF_VALUE: 16
PIF_VALUE: 17
PIF_VALUE: 16
PIF_VALUE: 17
PIF_VALUE: 1
PIF_VALUE: 6
PIF_VALUE: 16
PIF_VALUE: 16
PIF_VALUE: 17
PIF_VALUE: 16
PIF_VALUE: 18
PIF_VALUE: 1
PIF_VALUE: 16
PIF_VALUE: 2
PIF_VALUE: 16
PIF_VALUE: 2
PIF_VALUE: 18
PIF_VALUE: 2
PIF_VALUE: 17
PIF_VALUE: 18
PIF_VALUE: 16
PIF_VALUE: 18
PIF_VALUE: 17
PIF_VALUE: 16
PIF_VALUE: 1
PIF_VALUE: 18

## 2021-05-26 ASSESSMENT — PAIN DESCRIPTION - PROGRESSION
CLINICAL_PROGRESSION: NOT CHANGED

## 2021-05-26 ASSESSMENT — PAIN DESCRIPTION - FREQUENCY: FREQUENCY: CONTINUOUS

## 2021-05-26 ASSESSMENT — PAIN DESCRIPTION - PAIN TYPE
TYPE: SURGICAL PAIN
TYPE: SURGICAL PAIN

## 2021-05-26 ASSESSMENT — PAIN DESCRIPTION - DESCRIPTORS
DESCRIPTORS: PRESSURE
DESCRIPTORS: ACHING;PRESSURE
DESCRIPTORS: ACHING
DESCRIPTORS: PRESSURE

## 2021-05-26 ASSESSMENT — PAIN DESCRIPTION - LOCATION
LOCATION: PENIS;GROIN
LOCATION: PENIS

## 2021-05-26 ASSESSMENT — LIFESTYLE VARIABLES: SMOKING_STATUS: 1

## 2021-05-26 ASSESSMENT — PAIN - FUNCTIONAL ASSESSMENT: PAIN_FUNCTIONAL_ASSESSMENT: 0-10

## 2021-05-26 ASSESSMENT — PAIN DESCRIPTION - ONSET: ONSET: SUDDEN

## 2021-05-26 NOTE — ANESTHESIA PRE PROCEDURE
Department of Anesthesiology  Preprocedure Note       Name:  Elder Pinon   Age:  45 y.o.  :  1970                                          MRN:  [id number]         Date:  2021      Surgeon: Gareth Evans):  Tyler Danielle MD    Procedure: Procedure(s):  CYSTOSCOPY TRANSURETHRAL RESECTION PROSTATE    Medications prior to admission:   Prior to Admission medications    Medication Sig Start Date End Date Taking? Authorizing Provider   atorvastatin (LIPITOR) 20 MG tablet Take 20 mg by mouth daily   Yes Historical Provider, MD   DULoxetine (CYMBALTA) 20 MG extended release capsule Take 20 mg by mouth daily   Yes Historical Provider, MD   gabapentin (NEURONTIN) 300 MG capsule Take 1 capsule by mouth 3 times daily for 30 days.  21 Yes ANGELINA Irene CNP   ampicillin (PRINCIPEN) 500 MG capsule Take 1 capsule by mouth 4 times daily for 10 days 21 Yes ANGELINA Talbot - CNP   etodolac (LODINE) 400 MG tablet TAKE 1 TABLET BY MOUTH THREE TIMES A DAY 21  Yes ANGELINA Irene CNP   tamsulosin (FLOMAX) 0.4 MG capsule Take 0.4 mg by mouth daily 20  Yes Historical Provider, MD   traZODone (DESYREL) 50 MG tablet Take 50 mg by mouth nightly  21   Historical Provider, MD   baclofen (LIORESAL) 10 MG tablet Take 10 mg by mouth 3 times daily 20   Historical Provider, MD       Current medications:    Current Facility-Administered Medications   Medication Dose Route Frequency Provider Last Rate Last Admin    0.9 % sodium chloride infusion   Intravenous Continuous Tyler Danielle  mL/hr at 21 0944 New Bag at 21 0944    ceFAZolin (ANCEF) 2000 mg in dextrose 5 % 50 mL IVPB  2,000 mg Intravenous 30 Min Pre-Op Tyler Danielle MD           Allergies:  No Known Allergies    Problem List:    Patient Active Problem List   Diagnosis Code    Lumbar radiculopathy M54.16    Spinal stenosis of lumbar region without neurogenic claudication M48.061    Lumbar spondylosis M47.816       Past Medical History:        Diagnosis Date    Cerebral artery occlusion with cerebral infarction ([address]     PT STATES TIA    DDD (degenerative disc disease), lumbar     Enlarged prostate     Fibromyalgia     Hyperlipidemia     Hypertension     Kidney stones     Lumbago        Past Surgical History:        Procedure Laterality Date    COLONOSCOPY      LITHOTRIPSY      PAIN MANAGEMENT PROCEDURE N/A 1/5/2021    LESI L5 #1 performed by Ewa Velasquez MD at [place] [address] N/A 3/26/2021    LESI # 2 @ L4 performed by Ewa Velasquez MD at [place] [address] Bilateral 5/3/2021    bilateral L-facet MBB # 1 @ L3-4, L4-5 performed by Ewa Velasquez MD at [place] [address]  2017       Social History:    Social History     Tobacco Use    Smoking status: Current Every Day Smoker     Packs/day: 0.50     Types: Cigarettes    Smokeless tobacco: Never Used   Substance Use Topics    Alcohol use: Not Currently                                Ready to quit: Not Answered  Counseling given: Not Answered      Vital Signs (Current):   Vitals:    05/19/21 1312 05/26/21 0854 05/26/21 0901   BP:  116/77    Pulse:  61    Resp:  18    Temp:  97.8 °F (36.6 °C)    TempSrc:  Temporal    SpO2:  97%    Weight: 180 lb (81.6 kg)  180 lb (81.6 kg)   Height: 5' 11\" (1.803 m)  5' 11\" (1.803 m)                                              BP Readings from Last 3 Encounters:   05/26/21 116/77   05/20/21 124/64   05/03/21 105/61       NPO Status: Time of last liquid consumption: 2300                        Time of last solid consumption: 2300                        Date of last liquid consumption: 05/25/21                        Date of last solid food consumption: 05/25/21    BMI:   Wt Readings from Last 3 Encounters:   05/26/21 180 lb (81.6 kg)   05/20/21 180 lb (81.6 kg)   05/03/21 186 lb 9.6 oz (84.6 kg)     Body mass index is 25.1 kg/m². CBC:   Lab Results   Component Value Date    WBC 10.0 03/10/2021    RBC 4.76 03/10/2021    HGB 14.3 03/10/2021    HCT 45.1 03/10/2021    MCV 94.7 03/10/2021    RDW 12.6 03/10/2021     03/10/2021       CMP:   Lab Results   Component Value Date     03/10/2021    K 4.6 03/10/2021     03/10/2021    CO2 23 03/10/2021    BUN 12 03/10/2021    CREATININE 1.10 03/10/2021    GFRAA >60 03/10/2021    LABGLOM >60 03/10/2021    GLUCOSE 72 03/10/2021    PROT 6.7 03/10/2021    CALCIUM 9.2 03/10/2021    BILITOT 1.04 03/10/2021    ALKPHOS 62 03/10/2021    AST 17 03/10/2021    ALT 13 03/10/2021       POC Tests: No results for input(s): POCGLU, POCNA, POCK, POCCL, POCBUN, POCHEMO, POCHCT in the last 72 hours. Coags:   Lab Results   Component Value Date    INR 1.03 10/29/2020       HCG (If Applicable): No results found for: PREGTESTUR, PREGSERUM, HCG, HCGQUANT     ABGs: No results found for: PHART, PO2ART, FES7GNE, BRC0IRN, BEART, Q3UIQJOG     Type & Screen (If Applicable):  No results found for: LABABO, LABRH    Drug/Infectious Status (If Applicable):  No results found for: HIV, HEPCAB    COVID-19 Screening (If Applicable): No results found for: COVID19        Anesthesia Evaluation  Patient summary reviewed  Airway: Mallampati: III  TM distance: >3 FB   Neck ROM: full  Mouth opening: > = 3 FB Dental:          Pulmonary:   (+) current smoker          Patient smoked on day of surgery. Cardiovascular:    (+) hypertension:,       ECG reviewed                        Neuro/Psych:   (+) CVA: residual symptoms, neuromuscular disease:,             GI/Hepatic/Renal:             Endo/Other:                     Abdominal:           Vascular:                                        Anesthesia Plan      general     ASA 3       Induction: intravenous. MIPS: Postoperative opioids intended and Prophylactic antiemetics administered.   Anesthetic plan and risks discussed with patient and spouse. Plan discussed with CRNA. Petra Miranda.  [facility],    5/26/2021

## 2021-05-26 NOTE — ANESTHESIA POSTPROCEDURE EVALUATION
Department of Anesthesiology  Postprocedure Note    Patient: Trudi Finnegan  MRN: [id number]  YOB: 1970  Date of evaluation: 5/26/2021  Time:  4:07 PM     Procedure Summary     Date: 05/26/21 Room / Location: [facility]    Anesthesia Start: 2426 Anesthesia Stop: 1300    Procedure: CYSTOSCOPY TRANSURETHRAL RESECTION PROSTATE (N/A ) Diagnosis: (HYPERTROPHY OF PROSTATE WITH URINARY OBSTRUCTION)    Surgeons: Ilir Keller MD Responsible Provider: Mariam Quintanilla DO    Anesthesia Type: general ASA Status: 3          Anesthesia Type: general    Gerard Phase I: Gerard Score: 10    Gerard Phase II: Gerard Score: 10    Last vitals: Reviewed and per EMR flowsheets.        Anesthesia Post Evaluation    Patient location during evaluation: floor  Patient participation: waiting for patient participation  Level of consciousness: awake  Airway patency: patent  Nausea & Vomiting: no vomiting and no nausea  Cardiovascular status: hemodynamically stable  Respiratory status: acceptable  Hydration status: stable

## 2021-05-26 NOTE — H&P
Dr. Maranda Cartagena MD  [facility])  Urology Clinic  New Patient Visit      Patient:  Marli Dumont  YOB: 1970  Date: 5/26/2021    HISTORY OF PRESENT ILLNESS:   The patient is a 45 y.o. male who presents today for evaluation of the following problem(s): Severe BPH requiring 10 minutes to go. On Flomax and this isnt helping much. Cystoscopy demonstrated complete prostatic obstruction with severe BPH. Options were discussed. Patient elected for TURP. He understands risks including retrograde ejaculation. Overall the problem(s) : are worsening. Associated Symptoms: No dysuria, gross hematuria. Pain Severity: 0/10    Summary of old records:   Took flomax. Imaging/Labs reviewed during today's visit:  I have independently reviewed and verified the following films during today's visit.   None    Last several PSA's:  No results found for: PSA    Last total testosterone:  No results found for: TESTOSTERONE    Urinalysis today:  Results for POC orders placed in visit on 04/07/21   POCT Urinalysis No Micro (Auto)   Result Value Ref Range    Glucose, Ur Negative NEGATIVE mg/dl    Bilirubin Urine Large (A)     Ketones, Urine Negative NEGATIVE    Specific Gravity, Urine 1.025 1.002 - 1.030    Blood, UA POC Negative NEGATIVE    pH, Urine 6.00 5.0 - 9.0    Protein, Urine Negative NEGATIVE mg/dl    Urobilinogen, Urine 0.20 0.0 - 1.0 eu/dl    Nitrite, Urine Negative NEGATIVE    Leukocyte Clumps, Urine Small (A) NEGATIVE    Color, Urine Yellow YELLOW-STRAW    Character, Urine Clear [id number].[id number]         Last BUN and creatinine:  Lab Results   Component Value Date    BUN 12 03/10/2021     Lab Results   Component Value Date    CREATININE 1.10 03/10/2021       Imaging Reviewed during this Office Visit:   (results were independently reviewed by physician and radiology report verified)    PAST MEDICAL, FAMILY AND SOCIAL HISTORY:  Past Medical History:   Diagnosis Date    Cerebral artery occlusion with cerebral infarction ([address]     PT STATES TIA    DDD (degenerative disc disease), lumbar     Enlarged prostate     Fibromyalgia     Hyperlipidemia     Hypertension     Kidney stones     Lumbago      Past Surgical History:   Procedure Laterality Date    COLONOSCOPY      LITHOTRIPSY      PAIN MANAGEMENT PROCEDURE N/A 1/5/2021    LESI L5 #1 performed by Rivera Buckner MD at [facility] N/A 3/26/2021    LESI # 2 @ L4 performed by Rivera Buckner MD at [facility] Bilateral 5/3/2021    bilateral L-facet MBB # 1 @ L3-4, L4-5 performed by Rivera Buckner MD at [place] [address]  2017     Family History   Problem Relation Age of Onset    Cancer Mother     Diabetes Mother     Heart Disease Neg Hx     High Blood Pressure Neg Hx     Stroke Neg Hx      Outpatient Medications Marked as Taking for the 5/26/21 encounter [facility] En[facility])   Medication Sig Dispense Refill    atorvastatin (LIPITOR) 20 MG tablet Take 20 mg by mouth daily      DULoxetine (CYMBALTA) 20 MG extended release capsule Take 20 mg by mouth daily      gabapentin (NEURONTIN) 300 MG capsule Take 1 capsule by mouth 3 times daily for 30 days. 90 capsule 0    [DISCONTINUED] gabapentin (NEURONTIN) 300 MG capsule Take 300 mg by mouth 3 times daily.  ampicillin (PRINCIPEN) 500 MG capsule Take 1 capsule by mouth 4 times daily for 10 days 40 capsule 0    etodolac (LODINE) 400 MG tablet TAKE 1 TABLET BY MOUTH THREE TIMES A DAY 90 tablet 0    tamsulosin (FLOMAX) 0.4 MG capsule Take 0.4 mg by mouth daily         Patient has no known allergies.   Social History     Tobacco Use   Smoking Status Current Every Day Smoker    Packs/day: 0.50    Types: Cigarettes   Smokeless Tobacco Never Used       Social History     Substance and Sexual Activity   Alcohol Use Not Currently       REVIEW OF SYSTEMS:  Constitutional: negative  Eyes: negative Respiratory: negative  Cardiovascular: negative  Gastrointestinal: negative  Musculoskeletal: negative  Genitourinary: negative except for what is in HPI  Skin: negative   Neurological: negative  Hematological/Lymphatic: negative  Psychological: negative    Physical Exam:    This a 45 y.o. male   Vitals:    05/26/21 0854   BP: 116/77   Pulse: 61   Resp: 18   Temp: 97.8 °F (36.6 °C)   SpO2: 97%     Constitutional: Patient in no acute distress; Neuro: alert and oriented to person place and time. Psych: Mood and affect normal.  Skin: Normal  Lungs: Respiratory effort normal  Cardiovascular:  Normal peripheral pulses  Abdomen: Soft, non-tender, non-distended with no CVA, flank pain, hepatosplenomegaly or hernia. Kidneys normal.  Bladder non-tender and not distended. Lymphatics: no palpable lymphadenopathy      Assessment and Plan      Severe BPH with obstruction. Plan:      No follow-ups on file. To operating room for cystoscopy with TURP.          Dr. Kinjal Regan MD

## 2021-05-26 NOTE — PROGRESS NOTES
Transported to [facility] in stable condition. RA  A/O x 4  States pain at tolerable level. CBI infusing w/o difficulty. Ortega draining w/o difficulty. Pinkish-orange colored urine in ortega tubing noted. No clots noted.

## 2021-05-26 NOTE — PROGRESS NOTES
ADMITTED TO [facility] AND ORIENTED TO UNIT. SCDS ON. FALL  BANDS ON. PT VERBALIZED APPROVAL FOR FIRST NAME, LAST INITIAL AND PHYSICIAN NAME ON UNIT WHITEBOARD.

## 2021-05-26 NOTE — PROGRESS NOTES
Admitted to PACU from OR  Report received from anesthesia. Lethargic but arouses easily. Reorientation provided to situation/time.

## 2021-05-26 NOTE — OP NOTE
Dr. Esme Peguero MD  Urologic Surgery      [address] [place] [address]. [place]  05/26/21    Patient:  Joselito Ernandez  MRN: [id number]  YOB: 1970    Surgeon: Dr. Esme Peguero MD  Assistant: None    Pre-op Diagnosis: Severe BPH with outlet obstruction. Post-op Diagnosis: Same    Procedure:   Cystoscopy with Trans-Urethral Resection of the Prostate, TURP. Anesthesia: General  Complications: None  OR Blood Loss:  Minimal  Fluids: Crystalloids  Drains: 22F 3-Way Gayle  Specimens:  ID Type Source Tests Collected by Time Destination   A : Prostate Chips Tissue Prostate SURGICAL PATHOLOGY Esme Peguero MD 5/26/2021 1221        Indication:  There are 50-year-old male with severe urinary symptoms. He was having to wait 10 minutes in order to empty his bladder at the toilet. He underwent cystoscopy and this demonstrated severe bilateral BPH. We discussed different procedures. He elected to proceed with a TURP procedure. Risks and benefits were explained including risk of retrograde ejaculation. Narrative of the Procedure:    After informed consent was obtained, the patient was taken to the operating room. He was transferred to the operating table in the supine position. EPC cuffs were place on his legs, and the machine was turned on. Anesthesia was induced and antibiotics were given. He was placed in a modified dorsal lithotomy position. He was sterilely prepped and draped in a standard fashion. A timeout occurred in which 2 patient identifiers were used. We entered his urethra with a 25F scope with the visual obturator in place. Ishaan Buchanan Urethral dilation was not required to achieve this. Once within the prostatic urethra, we changed out into our working loop. We located both the ureteral orifices. They were remote from our resection bed. We began by creating a trough at the 5 o'clock position all the way from the bladder neck to the verumontanum. We then resected the entirety of the left lateral lobe.  We then achieved hemostasis and again created a trough from the bladder neck all the way back to the verumontanum, this time at the 7 o'clock position. We then addressed the right lateral lobe all the way down to the level of the surgical capsule. We then pulled back to the level of verumontanum. The sphincter was visualized and we went proximal to this. We then did our apical dissection delicately and carefully. The sphincter was preserved. Hemostasis was achieved. The prostate chips were irrigated from the bladder. We obtained further hemostasia at this time. The bladder was then re-evaluated and both ureteral orifices were noted to be untouched and remote from our resection bed. No further prostate chips were noted in the bladder. A 22F 3-Way Gayle catheter was placed into the bladder. 30 mL of sterile water was place in the balloon. The patient was hooked up to gentle bladder irrigation. He was then awakened and transferred back to his hospital bed in good and stable condition. Follow-Up: Widely patent prostate. Gayle needs to be removed tomorrow in our office. The patient should follow-up with one of our nurse practitioners in 4 to 6 weeks.     Maranda Cartagena MD  Electronically signed on 5/26/2021 at 12:48 PM

## 2021-05-26 NOTE — PROGRESS NOTES
Pt returned to [facility] room 6. Vitals and assessment as charted. 0.9 infusing, @ 50ml to count from PACU. Pt has crackers and water. Family at the bedside. Pt and family verbalized understanding of discharge criteria and call light use. Call light in reach.

## 2021-05-27 ENCOUNTER — TELEPHONE (OUTPATIENT)
Dept: UROLOGY | Age: 51
End: 2021-05-27

## 2021-05-27 VITALS
SYSTOLIC BLOOD PRESSURE: 124 MMHG | HEART RATE: 52 BPM | OXYGEN SATURATION: 97 % | WEIGHT: 180 LBS | BODY MASS INDEX: 25.2 KG/M2 | HEIGHT: 71 IN | DIASTOLIC BLOOD PRESSURE: 91 MMHG | RESPIRATION RATE: 17 BRPM | TEMPERATURE: 97.5 F

## 2021-05-27 LAB
ANION GAP SERPL CALCULATED.3IONS-SCNC: 10 MEQ/L (ref 8–16)
BASOPHILS # BLD: 0.2 %
BASOPHILS ABSOLUTE: 0 THOU/MM3 (ref 0–0.1)
BUN BLDV-MCNC: 11 MG/DL (ref 7–22)
CALCIUM SERPL-MCNC: 9 MG/DL (ref 8.5–10.5)
CHLORIDE BLD-SCNC: 107 MEQ/L (ref 98–111)
CO2: 25 MEQ/L (ref 23–33)
CREAT SERPL-MCNC: 0.9 MG/DL (ref 0.4–1.2)
EOSINOPHIL # BLD: 0 %
EOSINOPHILS ABSOLUTE: 0 THOU/MM3 (ref 0–0.4)
ERYTHROCYTE [DISTWIDTH] IN BLOOD BY AUTOMATED COUNT: 12.3 % (ref 11.5–14.5)
ERYTHROCYTE [DISTWIDTH] IN BLOOD BY AUTOMATED COUNT: 42.4 FL (ref 35–45)
GFR SERPL CREATININE-BSD FRML MDRD: 89 ML/MIN/1.73M2
GLUCOSE BLD-MCNC: 108 MG/DL (ref 70–108)
HCT VFR BLD CALC: 47.2 % (ref 42–52)
HEMOGLOBIN: 15.1 GM/DL (ref 14–18)
IMMATURE GRANS (ABS): 0.13 THOU/MM3 (ref 0–0.07)
IMMATURE GRANULOCYTES: 0.8 %
LYMPHOCYTES # BLD: 11.3 %
LYMPHOCYTES ABSOLUTE: 1.9 THOU/MM3 (ref 1–4.8)
MCH RBC QN AUTO: 30 PG (ref 26–33)
MCHC RBC AUTO-ENTMCNC: 32 GM/DL (ref 32.2–35.5)
MCV RBC AUTO: 93.8 FL (ref 80–94)
MONOCYTES # BLD: 4.3 %
MONOCYTES ABSOLUTE: 0.7 THOU/MM3 (ref 0.4–1.3)
NUCLEATED RED BLOOD CELLS: 0 /100 WBC
PLATELET # BLD: 241 THOU/MM3 (ref 130–400)
PMV BLD AUTO: 9.9 FL (ref 9.4–12.4)
POTASSIUM REFLEX MAGNESIUM: 5.2 MEQ/L (ref 3.5–5.2)
RBC # BLD: 5.03 MILL/MM3 (ref 4.7–6.1)
SEG NEUTROPHILS: 83.4 %
SEGMENTED NEUTROPHILS ABSOLUTE COUNT: 14 THOU/MM3 (ref 1.8–7.7)
SODIUM BLD-SCNC: 142 MEQ/L (ref 135–145)
WBC # BLD: 16.8 THOU/MM3 (ref 4.8–10.8)

## 2021-05-27 PROCEDURE — G0378 HOSPITAL OBSERVATION PER HR: HCPCS

## 2021-05-27 PROCEDURE — 36415 COLL VENOUS BLD VENIPUNCTURE: CPT

## 2021-05-27 PROCEDURE — 85025 COMPLETE CBC W/AUTO DIFF WBC: CPT

## 2021-05-27 PROCEDURE — 2580000003 HC RX 258: Performed by: UROLOGY

## 2021-05-27 PROCEDURE — 51700 IRRIGATION OF BLADDER: CPT

## 2021-05-27 PROCEDURE — APPNB30 APP NON BILLABLE TIME 0-30 MINS: Performed by: UROLOGY

## 2021-05-27 PROCEDURE — 80048 BASIC METABOLIC PNL TOTAL CA: CPT

## 2021-05-27 PROCEDURE — 6370000000 HC RX 637 (ALT 250 FOR IP): Performed by: UROLOGY

## 2021-05-27 RX ADMIN — HYDROCODONE BITARTRATE AND ACETAMINOPHEN 1 TABLET: 5; 325 TABLET ORAL at 06:21

## 2021-05-27 RX ADMIN — GABAPENTIN 300 MG: 300 CAPSULE ORAL at 08:04

## 2021-05-27 RX ADMIN — DULOXETINE HYDROCHLORIDE 20 MG: 20 CAPSULE, DELAYED RELEASE ORAL at 08:04

## 2021-05-27 RX ADMIN — SODIUM CHLORIDE: 9 INJECTION, SOLUTION INTRAVENOUS at 06:21

## 2021-05-27 RX ADMIN — ATORVASTATIN CALCIUM 20 MG: 20 TABLET, FILM COATED ORAL at 08:02

## 2021-05-27 RX ADMIN — TAMSULOSIN HYDROCHLORIDE 0.4 MG: 0.4 CAPSULE ORAL at 08:05

## 2021-05-27 RX ADMIN — BACLOFEN 10 MG: 10 TABLET ORAL at 08:03

## 2021-05-27 ASSESSMENT — PAIN SCALES - GENERAL
PAINLEVEL_OUTOF10: 4
PAINLEVEL_OUTOF10: 3

## 2021-05-27 ASSESSMENT — PAIN DESCRIPTION - LOCATION: LOCATION: PENIS

## 2021-05-27 ASSESSMENT — PAIN DESCRIPTION - ORIENTATION: ORIENTATION: MID

## 2021-05-27 ASSESSMENT — PAIN DESCRIPTION - DESCRIPTORS: DESCRIPTORS: BURNING;ACHING

## 2021-05-27 ASSESSMENT — PAIN DESCRIPTION - PAIN TYPE: TYPE: SURGICAL PAIN

## 2021-05-27 NOTE — DISCHARGE SUMMARY
Urology Progress Note    Chief Complaint: Severe BPH with outlet obstruction    Subjective: \"    Patient is resting in bed, voiding yellow urine, +flatus, +BM, ambulating with assistance, tolerating regular diet, denies any nausea or vomiting. There are complaints of no pain at this time. Vitals:  BP (!) 124/91   Pulse 52   Temp 97.5 °F (36.4 °C) (Oral)   Resp 17   Ht 5' 11\" (1.803 m)   Wt 180 lb (81.6 kg)   SpO2 97%   BMI 25.10 kg/m²   Temp  Av.6 °F (36.4 °C)  Min: 97.3 °F (36.3 °C)  Max: 98 °F (36.7 °C)    Intake/Output Summary (Last 24 hours) at 2021 0843  Last data filed at 2021 0500  Gross per 24 hour   Intake 818.71 ml   Output 2550 ml   Net -1731.29 ml       Social History     Socioeconomic History    Marital status:      Spouse name: Not on file    Number of children: Not on file    Years of education: Not on file    Highest education level: Not on file   Occupational History    Not on file   Tobacco Use    Smoking status: Current Every Day Smoker     Packs/day: 0.50     Types: Cigarettes    Smokeless tobacco: Never Used   Vaping Use    Vaping Use: Never used   Substance and Sexual Activity    Alcohol use: Not Currently    Drug use: Yes     Types: Marijuana     Comment: occasional last use 21    Sexual activity: Not on file   Other Topics Concern    Not on file   Social History Narrative    Not on file     Social Determinants of Health     Financial Resource Strain:     Difficulty of Paying Living Expenses:    Food Insecurity:     Worried About Running Out of Food in the Last Year:     Ruben of Food in the Last Year:    Transportation Needs:     Lack of Transportation (Medical):      Lack of Transportation (Non-Medical):    Physical Activity:     Days of Exercise per Week:     Minutes of Exercise per Session:    Stress:     Feeling of Stress :    Social Connections:     Frequency of Communication with Friends and Family:     Frequency of Social Gatherings with Friends and Family:     Attends Judaism Services:     Active Member of Clubs or Organizations:     Attends Club or Organization Meetings:     Marital Status:    Intimate Partner Violence:     Fear of Current or Ex-Partner:     Emotionally Abused:     Physically Abused:     Sexually Abused:      Family History   Problem Relation Age of Onset    Cancer Mother     Diabetes Mother     Heart Disease Neg Hx     High Blood Pressure Neg Hx     Stroke Neg Hx      No Known Allergies      Constitutional: Alert and oriented times x3, no acute distress, and cooperative to examination with appropriate mood and affect. HEENT:   Head:         Normocephalic and atraumatic. Mucous membranes are normal.   Eyes:         EOM are normal.  Nose:    The external appearance of the nose is normal  Ears: The ears appear normal to external inspection. Cardiovascular:       Normal rate, regular rhythm. Pulmonary/Chest:  Normal respiratory rate and rhthym. No use of accessory muscles. Lungs clear bilaterally. Abdominal:          Soft. No tenderness. Active bowel sounds. Genitalia:    Ortega catheter draining yellow urine  Musculoskeletal:    Normal range of motion. He exhibits no edema or tenderness of lower extremities. Extremities:    No cyanosis, clubbing, or edema present. Neurological:    Alert and oriented.      Labs:  WBC:    Lab Results   Component Value Date    WBC 16.8 05/27/2021     Hemoglobin/Hematocrit:    Lab Results   Component Value Date    HGB 15.1 05/27/2021    HCT 47.2 05/27/2021     BMP:    Lab Results   Component Value Date     05/27/2021    K 5.2 05/27/2021     05/27/2021    CO2 25 05/27/2021    BUN 11 05/27/2021    LABALBU 4.2 03/10/2021    CREATININE 0.9 05/27/2021    CALCIUM 9.0 05/27/2021    GFRAA >60 03/10/2021    LABGLOM 89 05/27/2021           Impression:  Severe BPH with outlet obstruction      Plan:  Ok to remove ortega, void trial  Our office will schedule follow up in 4-6 weeks  POD #  1 cystoscopy with transurethral resection of the prostate, TURP by Dr General Bond, APRN - MILAGROS, APRN  21 8:43 AM  Urology    Patient Identification  Tito Pfeiffer is a 45 y.o. male. :  1970  Admit Date:  2021    Discharge date: 21                                    Disposition: home    Discharge Diagnoses:   Patient Active Problem List   Diagnosis    Lumbar radiculopathy    Spinal stenosis of lumbar region without neurogenic claudication    Lumbar spondylosis    Hypertrophy of prostate with urinary obstruction    Hematuria       Consults: none    Surgery: cystoscopy with transurethral resection of the prostate, TURP by Dr Jus Luu      Patient Instructions: Activity: no heavy lifting, pushing, pulling for 6 weeks, no driving while on analgesics. Diet: As tolerated  Follow-up with with NP in 4-6 weeks    Hospital course: Patient underwent cystoscopy with transurethral resection of the prostate, TURP by Dr Jus Luu   with no complications. Post-operatively he remained stable. Patient is tolerating diet, urinating adequately on his own, pain is minimal, ambulating well with assistance, having flatus and BM.       Time spent for discharge 20-25

## 2021-05-27 NOTE — TELEPHONE ENCOUNTER
He will be discharged today  He was a TURP with Dr Jameson Adames  He will need scheduled with NP in 4-6 weeks for follow up

## 2021-05-27 NOTE — PLAN OF CARE
Problem: Falls - Risk of:  Goal: Will remain free from falls  Description: Will remain free from falls  Outcome: Ongoing  Note: Free from falls this shift. Non skid slippers worn. Bed/chair alarm in use. Up with assistance of staff. Call light within reach. Hourly rounding continued. Problem: Falls - Risk of:  Goal: Absence of physical injury  Description: Absence of physical injury  Outcome: Ongoing     Problem: Pain:  Goal: Pain level will decrease  Description: Pain level will decrease  Outcome: Ongoing  Note: Pt is taking Norco PRN to control pain. Rating pain at 4/10 with goal of 2/10. Problem: Pain:  Goal: Control of acute pain  Description: Control of acute pain  Outcome: Ongoing     Problem: Pain:  Goal: Control of chronic pain  Description: Control of chronic pain  Outcome: Ongoing     Problem: Discharge Planning:  Goal: Patients continuum of care needs are met  Description: Patients continuum of care needs are met  Outcome: Ongoing  Note: Pt will return home upon D/C and is supposed to go to Dr. Yesenia Baumann office for catheter removal.      Problem:   Goal: Adequate urinary output  Outcome: Ongoing  Note: Patient has continuous bladder irrigation running at a slow rate with pale clear pink urine coming out. Problem:   Goal: No urinary complication  Outcome: Ongoing     Care plan reviewed with patient. Patient verbalize understanding of the plan of care and contribute to goal setting.

## 2021-05-28 ENCOUNTER — CARE COORDINATION (OUTPATIENT)
Dept: CASE MANAGEMENT | Age: 51
End: 2021-05-28

## 2021-05-28 NOTE — CARE COORDINATION
[facility] Transitions Initial Follow Up Call    Call within 2 business days of discharge: Yes    Patient: Archie Alfonso Patient : 1970   MRN: [id number]  Reason for Admission: cystoscopy with transurethral resection of the prostate, TURP by Dr Caleb Sandoval  Discharge Date: 21 RARS: Readmission Risk Score: 5      Last Discharge [facility]       Complaint Diagnosis Description Type Department Provider    21  Post-op pain Admission (Discharged) Melissa Desai MD        Advance Care Planning:   Does patient have an Advance Directive:  decision maker updated. Was this a readmission? No  Patient stated reason for admission: difficulty urinating  Patients top risk factors for readmission: medical condition-post op issues    Care Transition Nurse (CTN) contacted the patient by telephone to perform post hospital discharge assessment. Verified name and  with patient as identifiers. Provided introduction to self, and explanation of the CTN role. CTN reviewed discharge instructions, medical action plan and red flags with patient who verbalized understanding. Patient given an opportunity to ask questions and does not have any further questions or concerns at this time. Were discharge instructions available to patient? Yes. Reviewed appropriate site of care based on symptoms and resources available to patient including: PCP and Specialist. The patient agrees to contact the PCP office for questions related to their healthcare. Medication reconciliation was performed with patient, [facility]. No further follow-up call identified based on severity of symptoms and risk factors.     Non-face-to-face services provided:  Obtained and reviewed discharge summary and/or continuity of care documents    Care Transitions 24 Hour Call    Do you have any ongoing symptoms?: Yes  Patient-reported symptoms: Other (Comment: small amt blood urine)  Do you have a copy of your discharge instructions?: Yes  Do you have all of your prescriptions and are they filled?: Yes  Have you scheduled your follow up appointment?: Yes  How are you going to get to your appointment?: Car - family or friend to transport  Were you discharged with any Home Care or Post Acute Services: No  Care Transitions Interventions  No Identified Needs     Called pt for the [facility] initial F/U call. Pt stated he is doing very well. Pt stated he feels empty after voiding, goes \"frequently\" pt stated he has increased his fluid intake. Pt reported he has some red tinged urine, is lighter in color. Pt stated pain med helps with the pain, has not taken many \"don't want to get addicted\"    Pt stated he will call the PCP for  Hospital F/U    Pt denied any needs or concerns.   CTN will sign off    Follow Up  Future Appointments   Date Time Provider Pooja Chicas   6/30/2021  1:45 PM Tulio Dotson RN  Care Transition Nurse  [phone]

## 2021-06-02 ENCOUNTER — TELEPHONE (OUTPATIENT)
Dept: PHYSICAL MEDICINE AND REHAB | Age: 51
End: 2021-06-02

## 2021-06-02 NOTE — TELEPHONE ENCOUNTER
Pt. Contacted regarding procedure. Had recent TURP. F/U 6/30/2021 with Urology. Advised to call the office after follow up and cleared by Urology to schedule procedure. Verbalized understanding.

## 2021-06-04 RX ORDER — ETODOLAC 400 MG/1
TABLET, FILM COATED ORAL
Qty: 90 TABLET | Refills: 0 | Status: SHIPPED | OUTPATIENT
Start: 2021-06-04 | End: 2021-07-26 | Stop reason: SDUPTHER

## 2021-06-04 NOTE — TELEPHONE ENCOUNTER
OARRS reviewed. UDS:  Inconsistent THC- 11-nor delta 9 carboxy  Last seen: 5/20/2021.  Follow-up:   Future Appointments   Date Time Provider Pooja Chicas   6/30/2021  1:45 PM Chepe [address]

## 2021-06-08 ENCOUNTER — TELEPHONE (OUTPATIENT)
Dept: UROLOGY | Age: 51
End: 2021-06-08

## 2021-06-28 RX ORDER — GABAPENTIN 300 MG/1
300 CAPSULE ORAL 3 TIMES DAILY
Qty: 90 CAPSULE | Refills: 0 | Status: SHIPPED | OUTPATIENT
Start: 2021-06-28 | End: 2021-07-27 | Stop reason: SDUPTHER

## 2021-06-28 NOTE — TELEPHONE ENCOUNTER
OARRS reviewed. UDS: Inconsistent THC- 11-nor delta 9 carboxy  Last seen: 5/20/2021.  Follow-up:   Future Appointments   Date Time Provider Pooja Chicas   6/30/2021 10:00 AM Laura Euceda, [facility]

## 2021-06-29 NOTE — PROGRESS NOTES
[address] [facility].  [address] [place] [address]  Dept: [phone]  Loc: [phone]  Visit Date: 6/30/2021      HPI:     El Duncan is a 45 y.o. with past medical history as listed below who presents today in follow-up for BPH. Pt underwent cystoscopy,  With trans-urethral resection of prostate, TURP with Dr. Jacky Aj on 5/26/2021. Pathology revealed benign prostatic tissue with stromal and glandular hyperplasia, negative for malignancy. He reports dysuria, scrotal pain, and pink tinged urine yet. Urgency is getting better, stream is better. Denies any scrotal swelling. No nocturia  No fever or chills. He denies dysuria, hesitancy, weak stream, urgency, frequency, gross hematuria, flank pain, fever, chills, suprapubic pain,and feeling of incomplete emptying. He also denies night sweats, poor appetite, unexplained weight loss, fatigue, malaise. [facility] comes in today with wife. Hx is obtained from the patient and medical record. Current Outpatient Medications   Medication Sig Dispense Refill    ampicillin (PRINCIPEN) 500 MG capsule Take 1 capsule by mouth 4 times daily for 14 days 56 capsule 0    gabapentin (NEURONTIN) 300 MG capsule Take 1 capsule by mouth 3 times daily for 30 days. 90 capsule 0    etodolac (LODINE) 400 MG tablet TAKE 1 TABLET BY MOUTH THREE TIMES A DAY 90 tablet 0    atorvastatin (LIPITOR) 20 MG tablet Take 20 mg by mouth daily      DULoxetine (CYMBALTA) 20 MG extended release capsule Take 20 mg by mouth daily      traZODone (DESYREL) 50 MG tablet Take 50 mg by mouth nightly       baclofen (LIORESAL) 10 MG tablet Take 10 mg by mouth 3 times daily      tamsulosin (FLOMAX) 0.4 MG capsule Take 0.4 mg by mouth daily       No current facility-administered medications for this visit.        Past Medical History  [facility]  has a past medical history of Cerebral artery occlusion with cerebral infarction ([address] DDD (degenerative disc disease), lumbar, Enlarged prostate, Fibromyalgia, Hyperlipidemia, Hypertension, Kidney stones, and Lumbago. Past Surgical History  The patient  has a past surgical history that includes rhinoplasty (2017); Colonoscopy; Lithotripsy; Pain management procedure (N/A, 1/5/2021); Pain management procedure (N/A, 3/26/2021); Pain management procedure (Bilateral, 5/3/2021); and TURP (N/A, 5/26/2021). Family History  This patient's family history includes Cancer in his mother; Diabetes in his mother. Social History  Tejal Freire  reports that he has been smoking cigarettes. He has been smoking about 0.50 packs per day. He has never used smokeless tobacco. He reports previous alcohol use. He reports current drug use. Drug: Marijuana. Subjective:     Review of Systems  No problems with ears, nose or throat. No problems with eyes. No chest pain, shortness of breath, abdominal pain, extremity pain or weakness, and no neurological deficits. No rashes.  symptoms per HPI. The remainder of the review of symptoms is negative. Objective:     PE:   Vitals:    06/30/21 1001   Weight: 180 lb (81.6 kg)   Height: 5' 11\" (1.803 m)       Constitutional: Alert and oriented times 3, no acute distress and cooperative to examination with appropriate mood and affect. HENT:   Head:        Normocephalic and atraumatic. Mouth/Throat:         Mucous membranes are normal.   Eyes:         EOM are normal. No scleral icterus. PERRLA. Neck:        Supple, symmetrical, trachea midline    Pulmonary/Chest:     Normal respiratory rate and rhthym. No use of accessory muscles. Abdominal:         Soft. No tenderness. Bowel sounds present. Musculoskeletal:         Normal range of motion. No edema or tenderness of lower extremities. lumbar brace on. Extremities: No cyanosis, clubbing, or edema present. Neurological:        Alert and oriented. Psychiatric:        Normal mood and affect.       Labs   Urine dip demonstrates   Results for POC orders placed in visit on 06/30/21   poct post void residual   Result Value Ref Range    post void residual 36 ml       Patients recent PSA values are as follows  No results found for: PSA, PSADIA     Recent BUN/Creatinine:  Lab Results   Component Value Date    BUN 11 05/27/2021    CREATININE 0.9 05/27/2021         Assessment & Plan:     Maryam Demarco was seen today for post-op check. Diagnoses and all orders for this visit:    Hypertrophy of prostate with urinary obstruction  -     poct post void residual  -     Culture, Urine    Dysuria  -     Culture, Urine    Other orders  -     ampicillin (PRINCIPEN) 500 MG capsule; Take 1 capsule by mouth 4 times daily for 14 days    feels like he has UTI. Prior urine cultures with enterococcus. Unable to get urine today. Order given to patient. Can take Motrin for pain/scrotal discomfort. FU to reassess symptoms in 2 weeks. As he needs \"cleared\" in order to have lumbar injections. Return in about 2 weeks (around 7/14/2021) for fu with dr espinal, post op turp, recheck symptoms.  Afshan Rolle, APRN - CNP, APRN  Urology

## 2021-06-30 ENCOUNTER — OFFICE VISIT (OUTPATIENT)
Dept: UROLOGY | Age: 51
End: 2021-06-30
Payer: MEDICAID

## 2021-06-30 VITALS — HEIGHT: 71 IN | BODY MASS INDEX: 25.2 KG/M2 | WEIGHT: 180 LBS

## 2021-06-30 DIAGNOSIS — N13.8 HYPERTROPHY OF PROSTATE WITH URINARY OBSTRUCTION: Primary | ICD-10-CM

## 2021-06-30 DIAGNOSIS — N40.1 HYPERTROPHY OF PROSTATE WITH URINARY OBSTRUCTION: Primary | ICD-10-CM

## 2021-06-30 DIAGNOSIS — R30.0 DYSURIA: ICD-10-CM

## 2021-06-30 LAB — POST VOID RESIDUAL (PVR): 36 ML

## 2021-06-30 PROCEDURE — 51798 US URINE CAPACITY MEASURE: CPT | Performed by: NURSE PRACTITIONER

## 2021-06-30 PROCEDURE — 99024 POSTOP FOLLOW-UP VISIT: CPT | Performed by: NURSE PRACTITIONER

## 2021-06-30 RX ORDER — AMPICILLIN 500 MG/1
500 CAPSULE ORAL 4 TIMES DAILY
Qty: 56 CAPSULE | Refills: 0 | Status: SHIPPED | OUTPATIENT
Start: 2021-06-30 | End: 2021-07-14

## 2021-07-07 ENCOUNTER — TELEPHONE (OUTPATIENT)
Dept: UROLOGY | Age: 51
End: 2021-07-07

## 2021-07-21 ENCOUNTER — OFFICE VISIT (OUTPATIENT)
Dept: UROLOGY | Age: 51
End: 2021-07-21
Payer: MEDICAID

## 2021-07-21 VITALS — RESPIRATION RATE: 16 BRPM | WEIGHT: 177.9 LBS | HEIGHT: 71 IN | BODY MASS INDEX: 24.9 KG/M2

## 2021-07-21 DIAGNOSIS — N40.1 HYPERTROPHY OF PROSTATE WITH URINARY OBSTRUCTION: ICD-10-CM

## 2021-07-21 DIAGNOSIS — N13.8 HYPERTROPHY OF PROSTATE WITH URINARY OBSTRUCTION: ICD-10-CM

## 2021-07-21 DIAGNOSIS — R30.0 DYSURIA: Primary | ICD-10-CM

## 2021-07-21 LAB
BILIRUBIN URINE: NEGATIVE
BLOOD URINE, POC: ABNORMAL
CHARACTER, URINE: CLEAR
COLOR, URINE: YELLOW
GLUCOSE URINE: NEGATIVE MG/DL
KETONES, URINE: NEGATIVE
LEUKOCYTE CLUMPS, URINE: ABNORMAL
NITRITE, URINE: NEGATIVE
PH, URINE: 5.5 (ref 5–9)
PROTEIN, URINE: 100 MG/DL
SPECIFIC GRAVITY, URINE: 1.02 (ref 1–1.03)
UROBILINOGEN, URINE: 1 EU/DL (ref 0–1)

## 2021-07-21 PROCEDURE — 99024 POSTOP FOLLOW-UP VISIT: CPT | Performed by: UROLOGY

## 2021-07-21 PROCEDURE — 81003 URINALYSIS AUTO W/O SCOPE: CPT | Performed by: UROLOGY

## 2021-07-21 RX ORDER — OXYBUTYNIN CHLORIDE 10 MG/1
10 TABLET, EXTENDED RELEASE ORAL DAILY
Qty: 30 TABLET | Refills: 3 | Status: SHIPPED | OUTPATIENT
Start: 2021-07-21 | End: 2021-08-26 | Stop reason: SDUPTHER

## 2021-07-21 NOTE — PROGRESS NOTES
Dr. Sara Grullon MD  [facility])  Urology Clinic      Patient:  Star Linares  YOB: 1970  Date: 7/21/2021    HISTORY OF PRESENT ILLNESS:   The patient is a 45 y.o. male who presents today for evaluation of the following problem(s): Severe BPH requiring 10 minutes to go. On Flomax and this isnt helping much. We proceeded with TURP and he is doing better but still having some OAB. Overall the problem(s) : are stable. Associated Symptoms: No dysuria, gross hematuria. Pain Severity: 0/10    Summary of old records:   Took flomax. Imaging/Labs reviewed during today's visit:  I have independently reviewed and verified the following films during today's visit.   None    Last several PSA's:  No results found for: PSA    Last total testosterone:  No results found for: TESTOSTERONE    Urinalysis today:  Results for POC orders placed in visit on 07/21/21   POCT Urinalysis No Micro (Auto)   Result Value Ref Range    Glucose, Ur Negative NEGATIVE mg/dl    Bilirubin Urine Negative     Ketones, Urine Negative NEGATIVE    Specific Gravity, Urine 1.025 1.002 - 1.030    Blood, UA POC Large (A) NEGATIVE    pH, Urine 5.50 5.0 - 9.0    Protein, Urine 100 (A) NEGATIVE mg/dl    Urobilinogen, Urine 1.00 0.0 - 1.0 eu/dl    Nitrite, Urine Negative NEGATIVE    Leukocyte Clumps, Urine Moderate (A) NEGATIVE    Color, Urine Yellow YELLOW-STRAW    Character, Urine Clear [id number]         Last BUN and creatinine:  Lab Results   Component Value Date    BUN 11 05/27/2021     Lab Results   Component Value Date    CREATININE 0.9 05/27/2021       Imaging Reviewed during this Office Visit:   (results were independently reviewed by physician and radiology report verified)    PAST MEDICAL, FAMILY AND SOCIAL HISTORY:  Past Medical History:   Diagnosis Date    Cerebral artery occlusion with cerebral infarction [address] [address]     PT STATES TIA    DDD (degenerative disc disease), lumbar     Enlarged prostate     Fibromyalgia     Hyperlipidemia     Hypertension     Kidney stones     Lumbago      Past Surgical History:   Procedure Laterality Date    COLONOSCOPY      LITHOTRIPSY      PAIN MANAGEMENT PROCEDURE N/A 1/5/2021    LESI L5 #1 performed by Rivera Buckner MD at [place] [address] N/A 3/26/2021    LESI # 2 @ L4 performed by Rivera Buckner MD at [place] [address] Bilateral 5/3/2021    bilateral L-facet MBB # 1 @ L3-4, L4-5 performed by Rivera Buckner MD at [place] [address]    TURP N/A 5/26/2021    CYSTOSCOPY TRANSURETHRAL RESECTION PROSTATE performed by Natalie Mccallum MD at [address] [place] [address] History   Problem Relation Age of Onset    Cancer Mother     Diabetes Mother     Heart Disease Neg Hx     High Blood Pressure Neg Hx     Stroke Neg Hx      Outpatient Medications Marked as Taking for the 7/21/21 encounter (Office Visit) with Natalie Mccallum MD   Medication Sig Dispense Refill    gabapentin (NEURONTIN) 300 MG capsule Take 1 capsule by mouth 3 times daily for 30 days. 90 capsule 0    etodolac (LODINE) 400 MG tablet TAKE 1 TABLET BY MOUTH THREE TIMES A DAY 90 tablet 0    atorvastatin (LIPITOR) 20 MG tablet Take 20 mg by mouth daily      DULoxetine (CYMBALTA) 20 MG extended release capsule Take 20 mg by mouth daily      traZODone (DESYREL) 50 MG tablet Take 50 mg by mouth nightly       tamsulosin (FLOMAX) 0.4 MG capsule Take 0.4 mg by mouth daily         Patient has no known allergies.   Social History     Tobacco Use   Smoking Status Current Every Day Smoker    Packs/day: 0.50    Types: Cigarettes   Smokeless Tobacco Never Used       Social History     Substance and Sexual Activity   Alcohol Use Not Currently       REVIEW OF SYSTEMS:  Constitutional: negative  Eyes: negative  Respiratory: negative  Cardiovascular: negative  Gastrointestinal: negative  Musculoskeletal: negative  Genitourinary: negative except for what is in HPI  Skin: negative   Neurological: negative  Hematological/Lymphatic: negative  Psychological: negative    Physical Exam:    This a 45 y.o. male   Vitals:    07/21/21 1007   Resp: 16     Constitutional: Patient in no acute distress; Neuro: alert and oriented to person place and time. Psych: Mood and affect normal.  Skin: Normal  Lungs: Respiratory effort normal  Cardiovascular:  Normal peripheral pulses  Abdomen: Soft, non-tender, non-distended with no CVA, flank pain, hepatosplenomegaly or hernia. Kidneys normal.  Bladder non-tender and not distended. Lymphatics: no palpable lymphadenopathy      Assessment and Plan      1. Dysuria    2. Hypertrophy of prostate with urinary obstruction           Plan:      No follow-ups on file. Schedule for cystoscopy. Worsening BPH on Flomax. Ditropan ER 10mg daily. No coffee or soda.           Dr. Angel Conte MD

## 2021-07-26 NOTE — TELEPHONE ENCOUNTER
Anatoliy Hayden called requesting a refill on the following medications:  Requested Prescriptions     Pending Prescriptions Disp Refills    etodolac (LODINE) 400 MG tablet 90 tablet 0    gabapentin (NEURONTIN) 300 MG capsule 90 capsule 0     Sig: Take 1 capsule by mouth 3 times daily for 30 days.      [facility]      Date of last visit:   Date of next visit (if applicable): Visit date not found

## 2021-07-27 RX ORDER — GABAPENTIN 300 MG/1
300 CAPSULE ORAL 3 TIMES DAILY
Qty: 90 CAPSULE | Refills: 0 | Status: SHIPPED | OUTPATIENT
Start: 2021-07-28 | End: 2021-08-26 | Stop reason: SDUPTHER

## 2021-07-27 RX ORDER — ETODOLAC 400 MG/1
TABLET, FILM COATED ORAL
Qty: 90 TABLET | Refills: 0 | Status: SHIPPED | OUTPATIENT
Start: 2021-07-27 | End: 2021-08-26 | Stop reason: SDUPTHER

## 2021-08-10 NOTE — TELEPHONE ENCOUNTER
Medical clearance refaxed to Dr. Duncan [facility] for pt. To call the office regarding procedure and scheduling a follow up for medications.

## 2021-08-26 RX ORDER — GABAPENTIN 300 MG/1
300 CAPSULE ORAL 3 TIMES DAILY
Qty: 90 CAPSULE | Refills: 0 | Status: SHIPPED | OUTPATIENT
Start: 2021-08-26 | End: 2021-09-20 | Stop reason: SDUPTHER

## 2021-08-26 RX ORDER — ETODOLAC 400 MG/1
TABLET, FILM COATED ORAL
Qty: 90 TABLET | Refills: 0 | Status: SHIPPED | OUTPATIENT
Start: 2021-08-26 | End: 2021-09-20 | Stop reason: SDUPTHER

## 2021-08-26 NOTE — TELEPHONE ENCOUNTER
OARRS reviewed. UDS: No updated UDS. Last seen: 5/20/2021.  Follow-up:   Future Appointments   Date Time Provider Pooja Chicas   9/13/2021 12:15 PM ANGELINA Ann - CNP N [facility] - [facility] [place]   10/20/2021 10:15 AM Elmira Byers MD [address] [place] [address]

## 2021-09-13 ENCOUNTER — OFFICE VISIT (OUTPATIENT)
Dept: PHYSICAL MEDICINE AND REHAB | Age: 51
End: 2021-09-13
Payer: MEDICAID

## 2021-09-13 VITALS
DIASTOLIC BLOOD PRESSURE: 80 MMHG | HEIGHT: 71 IN | SYSTOLIC BLOOD PRESSURE: 122 MMHG | WEIGHT: 177 LBS | BODY MASS INDEX: 24.78 KG/M2

## 2021-09-13 DIAGNOSIS — M47.819 FACET ARTHROPATHY OF SPINE: ICD-10-CM

## 2021-09-13 DIAGNOSIS — M54.17 LUMBOSACRAL RADICULITIS: ICD-10-CM

## 2021-09-13 DIAGNOSIS — M51.36 ANNULAR TEAR OF LUMBAR DISC: ICD-10-CM

## 2021-09-13 DIAGNOSIS — G89.4 CHRONIC PAIN SYNDROME: ICD-10-CM

## 2021-09-13 DIAGNOSIS — M48.062 LUMBAR STENOSIS WITH NEUROGENIC CLAUDICATION: ICD-10-CM

## 2021-09-13 DIAGNOSIS — M47.816 SPONDYLOSIS OF LUMBAR REGION WITHOUT MYELOPATHY OR RADICULOPATHY: Primary | ICD-10-CM

## 2021-09-13 PROCEDURE — 3017F COLORECTAL CA SCREEN DOC REV: CPT | Performed by: NURSE PRACTITIONER

## 2021-09-13 PROCEDURE — G8427 DOCREV CUR MEDS BY ELIG CLIN: HCPCS | Performed by: NURSE PRACTITIONER

## 2021-09-13 PROCEDURE — G8420 CALC BMI NORM PARAMETERS: HCPCS | Performed by: NURSE PRACTITIONER

## 2021-09-13 PROCEDURE — 4004F PT TOBACCO SCREEN RCVD TLK: CPT | Performed by: NURSE PRACTITIONER

## 2021-09-13 PROCEDURE — 99214 OFFICE O/P EST MOD 30 MIN: CPT | Performed by: NURSE PRACTITIONER

## 2021-09-13 RX ORDER — DULOXETIN HYDROCHLORIDE 30 MG/1
CAPSULE, DELAYED RELEASE ORAL 2 TIMES DAILY
COMMUNITY
Start: 2021-09-07

## 2021-09-13 RX ORDER — CYCLOBENZAPRINE HCL 5 MG
5 TABLET ORAL 3 TIMES DAILY PRN
Qty: 30 TABLET | Refills: 0 | Status: SHIPPED | OUTPATIENT
Start: 2021-09-13 | End: 2022-02-02 | Stop reason: SDUPTHER

## 2021-09-13 ASSESSMENT — ENCOUNTER SYMPTOMS: BACK PAIN: 1

## 2021-09-13 NOTE — PROGRESS NOTES
[address] [facility] [address]  Dept: [phone]  Dept Fax: [phone]: [phone]    Visit Date: 9/13/2021    Functionality Assessment/Goals Worksheet     On a scale of 0 (Does not Interfere) to 10 (Completely Interferes)     1. Which number describes how during the past week pain has interfered with       the following:  A. General Activity:  9  B. Mood: 8  C. Walking Ability:  10  D. Normal Work (Includes both work outside the home and housework):  10  E. Relations with Other People:   10  F. Sleep:   9  G. Enjoyment of Life:   10    2. Patient Prefers to Take their Pain Medications:     []  On a regular basis   [x]  Only when necessary    []  Does not take pain medications    3. What are the Patient's Goals/Expectations for Visiting Pain Management? []  Learn about my pain    []  Receive Medication   []  Physical Therapy     []  Treat Depression   []  Receive Injections    []  Treat Sleep   []  Deal with Anxiety and Stress   []  Treat Opoid Dependence/Addiction   [x]  Other:      HPI:   Yan Segovia is a 45 y.o. male is here today for    Chief Complaint: Low back pain, leg pain     Wife present     HPI   4 month FU to review EMG and Dr. Estuardo Gage notes. Continues to have pain in low back across- electric pain, sharp, pulling pain. Continues to have in legs and toes and also has numbness in hands. States that he has been doing exercises given by Dr. Renny Brown and they help some. Continues Neurontin 300 mg TID. Continues to use marijuana which does help   Pain increases with bending, lifting, twisting  and getting up and down. Did not have bilateral L-facet MBB ordered last visit d/t his prostate surgery       Medications reviewed. Patient denies side effects with medications. Patient states he is taking medications as prescribed.  Hedenies receiving pain medications from other sources. He denies any ER visits since last visit. Pain scale with out pain medications or at its worst is 6-8/10. Last dose of Neurontin was today   Drug screen reviewed from 10/19/2020 and was + THC       The patienthas No Known Allergies. Subjective:      Review of Systems   Constitutional: Negative. Genitourinary: Positive for difficulty urinating. Musculoskeletal: Positive for arthralgias, back pain and myalgias. Neurological: Positive for weakness and numbness. Psychiatric/Behavioral: Positive for sleep disturbance. The patient is not nervous/anxious. Objective:     Vitals:    09/13/21 1214   BP: 122/80   Weight: 177 lb (80.3 kg)   Height: 5' 11\" (1.803 m)       Physical Exam  Vitals reviewed. Constitutional:       General: He is not in acute distress. Appearance: He is well-developed. He is not diaphoretic. HENT:      Head: Normocephalic and atraumatic. Not macrocephalic and not microcephalic. Right Ear: External ear normal.      Left Ear: External ear normal.      Mouth/Throat:      Mouth: Mucous membranes are moist.   Eyes:      General:         Right eye: No discharge. Left eye: No discharge. Conjunctiva/sclera: Conjunctivae normal.   Neck:      Trachea: No tracheal deviation. Cardiovascular:      Rate and Rhythm: Normal rate and regular rhythm. Pulses: Normal pulses. Heart sounds: Normal heart sounds. Pulmonary:      Effort: Pulmonary effort is normal. No respiratory distress. Abdominal:      General: Abdomen is flat. Palpations: Abdomen is soft. Musculoskeletal:         General: Tenderness present. Cervical back: Muscular tenderness present. Lumbar back: Spasms and tenderness present. Decreased range of motion. Back:       Right upper leg: Tenderness and bony tenderness present. Left upper leg: Tenderness and bony tenderness present.       Right lower leg: Tenderness and bony tenderness present. Left lower leg: Tenderness and bony tenderness present. Legs:    Skin:     General: Skin is warm and dry. Coloration: Skin is not pale. Findings: No rash. Neurological:      Mental Status: He is alert and oriented to person, place, and time. Cranial Nerves: No cranial nerve deficit. Sensory: Sensory deficit present. Motor: Weakness present. Comments: 4/5 bilateral lower extremity   + Bilateral leg SLR at 60 degrees    Psychiatric:         Attention and Perception: He is attentive. Speech: Speech normal.         Behavior: Behavior normal.         Thought Content: Thought content normal.         Judgment: Judgment normal.       GUMARO test: +   Yeomans test: +   Gaenslen test: +      Assessment:     1. Spondylosis of lumbar region without myelopathy or radiculopathy    2. Facet arthropathy of spine    3. Lumbosacral radiculitis    4. Lumbar stenosis with neurogenic claudication    5. Annular tear of lumbar disc    6. Chronic pain syndrome            Plan:      OARRS reviewed. Current MED: 0  Patient was offered naloxone for home. Discussed long term side effects of medications, tolerance, dependency and addiction. Previous UDS reviewed  UDS preformed today for compliance. Patient told can not receive any pain medications from any other source. No evidence of abuse, diversion or aberrant behavior. Medications and/or procedures to improve function and quality of life- patient understanding with this and that may not be pain free  Discussed with patient about safe storage of medications at home  Discussed possible weaning of medication dosing dependent on treatment/procedure results. Discussed with patient about risks with procedure including infection, reaction to medication, increased pain, or bleeding.   Procedure notes reveiwed in detail  Received 80% relief of pain across lower back pain for over 1 week from bilateral L-facet MBB # 1 with improvement in mobility. Plan bilateral L-facet MBB # 2 @ L3-4, L4-5 for diagnostic purpose. Procedure and risks discussed in detail with patient. Reviewed EMG and Dr. Severo Milks notes- Normal. Continue home exercises. At this time instructed patient to increase Neurontin 300 mg in am and afternoon and 600 at bedtime- filled 8/26/2021 and still has plenty. Trial flexeril 5 mg TID prn for spasms- 10 day trial   Uses THC- no opioids       Meds. Prescribed:   Orders Placed This Encounter   Medications    cyclobenzaprine (FLEXERIL) 5 MG tablet     Sig: Take 1 tablet by mouth 3 times daily as needed for Muscle spasms     Dispense:  30 tablet     Refill:  0       Return for bilateral L-facet MBB # 2 @ L3-4, L4-5. , follow up after procedure.                Electronically signed by ANGELINA Arguello - CNP on9/13/2021 at 12:35 PM

## 2021-09-20 RX ORDER — ETODOLAC 400 MG/1
TABLET, FILM COATED ORAL
Qty: 90 TABLET | Refills: 1 | Status: SHIPPED | OUTPATIENT
Start: 2021-09-25 | End: 2021-10-25 | Stop reason: SDUPTHER

## 2021-09-20 RX ORDER — GABAPENTIN 300 MG/1
300 CAPSULE ORAL 3 TIMES DAILY
Qty: 90 CAPSULE | Refills: 0 | Status: SHIPPED | OUTPATIENT
Start: 2021-09-25 | End: 2021-10-21

## 2021-09-20 NOTE — TELEPHONE ENCOUNTER
OARRS reviewed. UDS: No Updated UDS>   Last seen: 9/13/2021.  Follow-up:   Future Appointments   Date Time Provider Pooja Chicas   10/20/2021 10:15 AM Jordin Matute MD [address]   12/13/2021 12:15 PM [facility]

## 2021-10-16 ENCOUNTER — HOSPITAL ENCOUNTER (EMERGENCY)
Age: 51
Discharge: HOME | End: 2021-10-16
Payer: MEDICAID

## 2021-10-16 VITALS
SYSTOLIC BLOOD PRESSURE: 149 MMHG | BODY MASS INDEX: 25.2 KG/M2 | DIASTOLIC BLOOD PRESSURE: 92 MMHG | HEART RATE: 77 BPM | WEIGHT: 180 LBS | RESPIRATION RATE: 16 BRPM | HEIGHT: 71 IN | TEMPERATURE: 97.2 F | OXYGEN SATURATION: 97 %

## 2021-10-16 DIAGNOSIS — H01.004 BLEPHARITIS OF LEFT UPPER EYELID, UNSPECIFIED TYPE: Primary | ICD-10-CM

## 2021-10-16 PROCEDURE — 99213 OFFICE O/P EST LOW 20 MIN: CPT

## 2021-10-16 PROCEDURE — 99213 OFFICE O/P EST LOW 20 MIN: CPT | Performed by: NURSE PRACTITIONER

## 2021-10-16 RX ORDER — AMOXICILLIN AND CLAVULANATE POTASSIUM 875; 125 MG/1; MG/1
1 TABLET, FILM COATED ORAL 2 TIMES DAILY
Qty: 14 TABLET | Refills: 0 | Status: SHIPPED | OUTPATIENT
Start: 2021-10-16 | End: 2021-10-23

## 2021-10-16 RX ORDER — ERYTHROMYCIN 5 MG/G
OINTMENT OPHTHALMIC
Qty: 1 G | Refills: 0 | Status: SHIPPED | OUTPATIENT
Start: 2021-10-16 | End: 2021-11-04

## 2021-10-16 ASSESSMENT — PAIN DESCRIPTION - ORIENTATION: ORIENTATION: LEFT

## 2021-10-16 ASSESSMENT — PAIN DESCRIPTION - ONSET: ONSET: ON-GOING

## 2021-10-16 ASSESSMENT — PAIN DESCRIPTION - DESCRIPTORS: DESCRIPTORS: DISCOMFORT;BURNING

## 2021-10-16 ASSESSMENT — ENCOUNTER SYMPTOMS
EYE REDNESS: 1
EYE ITCHING: 0
VOMITING: 0
NAUSEA: 0
EYE PAIN: 1
PHOTOPHOBIA: 0
EYE DISCHARGE: 0

## 2021-10-16 ASSESSMENT — PAIN DESCRIPTION - FREQUENCY: FREQUENCY: CONTINUOUS

## 2021-10-16 ASSESSMENT — PAIN DESCRIPTION - PAIN TYPE: TYPE: ACUTE PAIN

## 2021-10-16 ASSESSMENT — PAIN DESCRIPTION - PROGRESSION: CLINICAL_PROGRESSION: GRADUALLY WORSENING

## 2021-10-16 ASSESSMENT — PAIN SCALES - GENERAL: PAINLEVEL_OUTOF10: 3

## 2021-10-16 ASSESSMENT — PAIN DESCRIPTION - LOCATION: LOCATION: EYE

## 2021-10-16 NOTE — ED NOTES
Patient discharge instructions given to pt and pt verbalized understanding, no other needs at this time, and pt left in stable condition.      Nedra Mohs, RN  10/16/21 5613

## 2021-10-16 NOTE — ED PROVIDER NOTES
[address]  Urgent Care Encounter       CHIEF COMPLAINT       Chief Complaint   Patient presents with    Eye Problem     left eye       Nurses Notes reviewed and I agree except as noted in the HPI. HISTORY OF PRESENT ILLNESS   Antonio Barrios is a 45 y.o. male who presents with complaints of redness and burning sensation to the left upper eyelid, onset 2 days ago. Patient denies any discharge from the and no photophobia. He denies any injuries to the eye and no foreign bodies in the eye. He has not treated the eye with anything. The history is provided by the patient. REVIEW OF SYSTEMS     Review of Systems   Constitutional: Negative for chills and fever. Eyes: Positive for pain (Upper lid) and redness (Upper lid). Negative for photophobia, discharge, itching and visual disturbance. Gastrointestinal: Negative for nausea and vomiting. Skin: Negative for wound. Neurological: Positive for headaches (Occasional). PAST MEDICAL HISTORY         Diagnosis Date    Cerebral artery occlusion with cerebral infarction ([facility] [address]     PT STATES TIA    DDD (degenerative disc disease), lumbar     Enlarged prostate     Fibromyalgia     Hyperlipidemia     Hypertension     Kidney stones     Lumbago        SURGICALHISTORY     Patient  has a past surgical history that includes rhinoplasty (2017); Colonoscopy; Lithotripsy; Pain management procedure (N/A, 1/5/2021); Pain management procedure (N/A, 3/26/2021); Pain management procedure (Bilateral, 5/3/2021); and TURP (N/A, 5/26/2021). CURRENT MEDICATIONS       Discharge Medication List as of 10/16/2021  7:15 PM      CONTINUE these medications which have NOT CHANGED    Details   etodolac (LODINE) 400 MG tablet TAKE 1 TABLET BY MOUTH THREE TIMES A DAY, Disp-90 tablet, R-1Normal      gabapentin (NEURONTIN) 300 MG capsule Take 1 capsule by mouth 3 times daily for 30 days. , Disp-90 capsule, R-0Normal      !!  DULoxetine (CYMBALTA) 30 MG extended release capsule TAKE 1 CAPSULE BY MOUTH ONCE A DAY WITH THE 60 MG CAPSULEHistorical Med      oxybutynin (DITROPAN XL) 10 MG extended release tablet Take 1 tablet by mouth daily, Disp-30 tablet, R-3Normal      atorvastatin (LIPITOR) 20 MG tablet Take 20 mg by mouth dailyHistorical Med      !! DULoxetine (CYMBALTA) 20 MG extended release capsule Take 20 mg by mouth dailyHistorical Med      traZODone (DESYREL) 50 MG tablet Take 50 mg by mouth nightly Historical Med      baclofen (LIORESAL) 10 MG tablet Take 10 mg by mouth 3 times dailyHistorical Med      tamsulosin (FLOMAX) 0.4 MG capsule Take 0.4 mg by mouth dailyHistorical Med       !! - Potential duplicate medications found. Please discuss with provider. ALLERGIES     Patient is has No Known Allergies. Patients   There is no immunization history on file for this patient. FAMILY HISTORY     Patient's family history includes Cancer in his mother; Diabetes in his mother. SOCIAL HISTORY     Patient  reports that he has been smoking cigarettes. He has been smoking about 0.50 packs per day. He has never used smokeless tobacco. He reports previous alcohol use. He reports current drug use. Drug: Marijuana. PHYSICAL EXAM     ED TRIAGE VITALS  BP: (!) 149/92, Temp: 97.2 °F (36.2 °C), Pulse: 77, Resp: 16, SpO2: 97 %,Estimated body mass index is 25.1 kg/m² as calculated from the following:    Height as of this encounter: 5' 11\" (1.803 m). Weight as of this encounter: 180 lb (81.6 kg). ,No LMP for male patient. Physical Exam  Vitals and nursing note reviewed. Constitutional:       General: He is not in acute distress. Appearance: He is well-developed. HENT:      Head: Normocephalic and atraumatic. Eyes:      General: No scleral icterus. Left eye: No foreign body, discharge or hordeolum. Conjunctiva/sclera:      Left eye: Left conjunctiva is not injected. No chemosis, exudate or hemorrhage.      Comments: Swelling and mild erythema to the upper lid. Pulmonary:      Effort: Pulmonary effort is normal. No respiratory distress. Skin:     General: Skin is warm and dry. Neurological:      General: No focal deficit present. Mental Status: He is alert and oriented to person, place, and time. Psychiatric:         Mood and Affect: Mood normal.         Speech: Speech normal.         Behavior: Behavior normal. Behavior is cooperative. DIAGNOSTIC RESULTS     Labs:No results found for this visit on 10/16/21. IMAGING:    No orders to display         EKG:      URGENT CARE COURSE:     Vitals:    10/16/21 1905   BP: (!) 149/92   Pulse: 77   Resp: 16   Temp: 97.2 °F (36.2 °C)   TempSrc: Temporal   SpO2: 97%   Weight: 180 lb (81.6 kg)   Height: 5' 11\" (1.803 m)       Medications - No data to display         PROCEDURES:  None    FINAL IMPRESSION      1. Blepharitis of left upper eyelid, unspecified type          DISPOSITION/ PLAN     Patient presents with blepharitis of the left upper eyelid. Treated with erythromycin ointment. Patient will be given Augmentin for potential of cellulitis. He is to follow-up in 2 days with family doctor eye doctor if not improving. ER for worsening condition as discussed. Tylenol and/or Motrin for pain. Further instructions were outlined verbally and in the patient's discharge instructions. All the patient's questions were answered. The patient/parent agreed with the plan and was discharged from the [facility] in good condition.       PATIENT REFERRED TO:  ANGELINA Hardy NP  [address] [facility]. / [facility] [id number]      DISCHARGE MEDICATIONS:  Discharge Medication List as of 10/16/2021  7:15 PM      START taking these medications    Details   amoxicillin-clavulanate (AUGMENTIN) 875-125 MG per tablet Take 1 tablet by mouth 2 times daily for 7 days, Disp-14 tablet, R-0Normal      erythromycin (ROMYCIN) 5 MG/GM ophthalmic ointment 3 times daily for 7 days, Disp-1 g, R-0, Normal             Discharge Medication List as of 10/16/2021  7:15 PM          Discharge Medication List as of 10/16/2021  7:15 PM          ANGELINA Dwyer CNP    (Please note that portions of this note were completed with a voice recognition program. Efforts were made to edit the dictations but occasionally words are mis-transcribed.)         ANGELINA Dwyer CNP  10/16/21 1942

## 2021-10-16 NOTE — ED TRIAGE NOTES
Pt to urgent care due to his left eye lid burning and discomfort. New onset of symptoms started roughly 2 days ago.

## 2021-10-20 ENCOUNTER — PROCEDURE VISIT (OUTPATIENT)
Dept: UROLOGY | Age: 51
End: 2021-10-20
Payer: MEDICAID

## 2021-10-20 ENCOUNTER — TELEPHONE (OUTPATIENT)
Dept: UROLOGY | Age: 51
End: 2021-10-20

## 2021-10-20 ENCOUNTER — PREP FOR PROCEDURE (OUTPATIENT)
Dept: UROLOGY | Age: 51
End: 2021-10-20

## 2021-10-20 VITALS — HEIGHT: 71 IN | BODY MASS INDEX: 25.48 KG/M2 | WEIGHT: 182 LBS

## 2021-10-20 DIAGNOSIS — N13.8 HYPERTROPHY OF PROSTATE WITH URINARY OBSTRUCTION: ICD-10-CM

## 2021-10-20 DIAGNOSIS — R30.0 DYSURIA: Primary | ICD-10-CM

## 2021-10-20 DIAGNOSIS — N40.1 HYPERTROPHY OF PROSTATE WITH URINARY OBSTRUCTION: ICD-10-CM

## 2021-10-20 DIAGNOSIS — Z01.818 PRE-OP TESTING: Primary | ICD-10-CM

## 2021-10-20 PROCEDURE — 52000 CYSTOURETHROSCOPY: CPT | Performed by: UROLOGY

## 2021-10-20 RX ORDER — SODIUM CHLORIDE 9 MG/ML
INJECTION, SOLUTION INTRAVENOUS CONTINUOUS
Status: CANCELLED | OUTPATIENT
Start: 2021-11-10

## 2021-10-20 NOTE — TELEPHONE ENCOUNTER
Patient scheduled for surgery with Dr Jessenia Carbone on 11/10/21. Surgery consent on arrival. Patient to do pre op urine culture and fasting labs on 10/26/21. Pre op testing to be done at [facility].  # [phone], Fax # [phone]. Surgery instructions mailed to the patient.

## 2021-10-20 NOTE — PROGRESS NOTES
Dr. Mitzi Corona MD  Urologic Surgery        [address] [place] [address]. [place]  10/20/21    Patient:  Omer Braxton  MRN: [id number]  YOB: 1970    Surgeon: Dr. Mitzi Corona MD  Assistant: None    Pre-op Diagnosis: Intermittent obstructive urinary flow. Post-op Diagnosis: Same    Procedure:   Cystoscopy. Anesthesia: Local  Complications: None  OR Blood Loss: Minimal  Fluids: Cystalloids  Specimens: None    Indication:  Patient is a 62-year-old male with history of severe BPH. He underwent a TURP procedure. Initially was doing well and developed worsening symptoms including overactive bladder and slow urine flow. Given his continued symptoms we discussed cystoscopy. After risks and benefits were explained he elected to proceed with today's procedure. Narrative of the Procedure:    After informed consent was obtained in the preoperative area, the patient was taken back to the operating room and remained on the [facility]. The patient was prepped and draped in a sterile manner. A time out occurred in which two patient identifiers were used. The flexible scope was carefully placed into the bladder. Findings:  Urethra: Normal.  No stricture or stenosis. Prostate: Patent. However there was a fibrotic band across the posterior aspect of the prostatic fossa. Not a classic bladder neck contracture appearance. Bladder Neck: Fibrotic band. Papillary lesions: None  Trabeculations: Mild  Cellules/Diverticula: None  Bladder Stones: None  Ureteral Orifices: Normal position with clear reflux    OVERALL IMPRESSION: Fibrotic band of tissue across posterior aspect of prostate. Follow-Up: Discussed surgical excision of fibrotic band which is causing obstructed urination. Patient would like to proceed.     Mitzi Corona MD  Electronically signed on 10/20/2021 at 10:46 AM

## 2021-10-20 NOTE — TELEPHONE ENCOUNTER
DO NOT TAKE ASPIRIN, PLAVIX, FISH OIL, COUMADIN, IBUPROFEN, MOTRIN-LIKE DRUGS AND ANY MULTIVITAMINS OR OVER THE COUNTER SUPPLEMENTS 5 DAYS PRIOR TO SURGERY. Prachi Cottondidier 1970 Diagnosis:     Surgical Physician: Dr. Zee Blocker have been scheduled for the procedure marked below:      Surgery: Cystoscopy,Transurethreal resection of the prostate, Transurethral incision of bladder neck contracture         Date: 11/10/21     Anesthesia: Anesthesiologist (General/Spinal)     Place of Service: [address] [place] [address] Second Floor Same Day Surgery         Arrive to same day surgery by:  7:00 am  (Surgery time is subject to change)      INSTRUCTIONS AS MARKED BELOW:    1.  DO NOT eat or drink anything after midnight before surgery. 2.  We prefer you shower or bathe with an antibacterial soap (Dial) the morning of surgery. 3.  Please ensure to have a  with you to transport you home. 4.  Please bring a current medication list, photo ID and insurance card(s) with you  5. Okay to take Tylenol  6. If you take Glucophage, Metformin or Janumet, hold 48-hours prior to surgery  7. Take blood pressure or heart medication as directed, if taken in the morning take with a small sip of water  8. The office will call you in 1-2 days after your procedure to schedule a follow up. DATE SENSITIVE TESTING *WALK IN *NO APPOINTMENT    Do the pre op urine culture and fasting labs on 10/26/21.  Orders included        Date: 10/20/2021

## 2021-10-20 NOTE — TELEPHONE ENCOUNTER
SURGERY [address]  [facility] [address] [place] [address], [address]      Phone *[phone] *[phone]   Surgical Scheduling Direct Line Phone *[phone] Fax *[phone]      Melodie Her 1970 male    [address] [address] [address]   Marital Status:          Home Phone: [phone]      Cell Phone:    Telephone Information:   Mobile [phone]          Surgeon: Dr. Timo Hanson      Surgery Date: 11/10/21   Time: 9:00 am    Procedure: Cystoscopy,Transurethreal resection of the prostate, Transurethral incision of bladder neck contracture     Diagnosis: BHP    Important Medical History: In [facility]    Special Inst/Equip:     CPT Codes:    26221,90218,95806  Latex Allergy: no     Cardiac Device:  no    Anesthesia:  General          Admission Type:  Same Day /OBS                       Admit Prior to Day of Surgery: no    Case Location:  Main OR            Preadmission Testing:  Phone Call          PAT Date and Time:______________________________________________________    PAT Confirmation #: ______________________________________________________    Post Op Visit: ___________________________________________________________    Need Preop Cardiac Clearance: no    Does Patient have Cardiologist/physician?      None    Surgery Confirmation #: __________________________________________________    : ________________________   Date: __________________________     Insurance Company Name: Jason Rodriguez

## 2021-10-21 RX ORDER — GABAPENTIN 300 MG/1
300 CAPSULE ORAL 3 TIMES DAILY
Qty: 90 CAPSULE | Refills: 0 | Status: SHIPPED | OUTPATIENT
Start: 2021-10-24 | End: 2021-11-17 | Stop reason: SDUPTHER

## 2021-10-21 NOTE — TELEPHONE ENCOUNTER
OARRS reviewed. UDS: Old screen. Last seen: 9/13/2021.  Follow-up:   Future Appointments   Date Time Provider Pooja Chicas   12/13/2021 12:15 PM ANGELINA Doran - CNP N SRP[facility] Pain [facility] - [address] [facility]

## 2021-10-25 ENCOUNTER — TELEPHONE (OUTPATIENT)
Dept: UROLOGY | Age: 51
End: 2021-10-25

## 2021-10-26 ENCOUNTER — HOSPITAL ENCOUNTER (OUTPATIENT)
Age: 51
Discharge: HOME | End: 2021-10-26
Payer: MEDICAID

## 2021-10-26 DIAGNOSIS — N40.1 HYPERTROPHY OF PROSTATE WITH URINARY OBSTRUCTION: ICD-10-CM

## 2021-10-26 DIAGNOSIS — N13.8 HYPERTROPHY OF PROSTATE WITH URINARY OBSTRUCTION: ICD-10-CM

## 2021-10-26 DIAGNOSIS — Z01.818 PRE-OP TESTING: ICD-10-CM

## 2021-10-26 LAB
ANION GAP SERPL CALCULATED.3IONS-SCNC: 9 MEQ/L (ref 8–16)
BASOPHILS # BLD: 0.2 %
BASOPHILS ABSOLUTE: 0 THOU/MM3 (ref 0–0.1)
BUN BLDV-MCNC: 14 MG/DL (ref 7–22)
CALCIUM SERPL-MCNC: 9.4 MG/DL (ref 8.5–10.5)
CHLORIDE BLD-SCNC: 104 MEQ/L (ref 98–111)
CO2: 28 MEQ/L (ref 23–33)
CREAT SERPL-MCNC: 1.1 MG/DL (ref 0.4–1.2)
EOSINOPHIL # BLD: 1.9 %
EOSINOPHILS ABSOLUTE: 0.2 THOU/MM3 (ref 0–0.4)
ERYTHROCYTE [DISTWIDTH] IN BLOOD BY AUTOMATED COUNT: 12.8 % (ref 11.5–14.5)
ERYTHROCYTE [DISTWIDTH] IN BLOOD BY AUTOMATED COUNT: 43.8 FL (ref 35–45)
GFR SERPL CREATININE-BSD FRML MDRD: 70 ML/MIN/1.73M2
GLUCOSE BLD-MCNC: 93 MG/DL (ref 70–108)
HCT VFR BLD CALC: 50.4 % (ref 42–52)
HEMOGLOBIN: 16.7 GM/DL (ref 14–18)
IMMATURE GRANS (ABS): 0.07 THOU/MM3 (ref 0–0.07)
IMMATURE GRANULOCYTES: 0.5 %
LYMPHOCYTES # BLD: 25.8 %
LYMPHOCYTES ABSOLUTE: 3.3 THOU/MM3 (ref 1–4.8)
MCH RBC QN AUTO: 30.8 PG (ref 26–33)
MCHC RBC AUTO-ENTMCNC: 33.1 GM/DL (ref 32.2–35.5)
MCV RBC AUTO: 93 FL (ref 80–94)
MONOCYTES # BLD: 6.6 %
MONOCYTES ABSOLUTE: 0.9 THOU/MM3 (ref 0.4–1.3)
NUCLEATED RED BLOOD CELLS: 0 /100 WBC
PLATELET # BLD: 295 THOU/MM3 (ref 130–400)
PMV BLD AUTO: 9.6 FL (ref 9.4–12.4)
POTASSIUM SERPL-SCNC: 4.6 MEQ/L (ref 3.5–5.2)
RBC # BLD: 5.42 MILL/MM3 (ref 4.7–6.1)
SEG NEUTROPHILS: 65 %
SEGMENTED NEUTROPHILS ABSOLUTE COUNT: 8.4 THOU/MM3 (ref 1.8–7.7)
SODIUM BLD-SCNC: 141 MEQ/L (ref 135–145)
WBC # BLD: 12.9 THOU/MM3 (ref 4.8–10.8)

## 2021-10-26 PROCEDURE — 36415 COLL VENOUS BLD VENIPUNCTURE: CPT

## 2021-10-26 PROCEDURE — 80048 BASIC METABOLIC PNL TOTAL CA: CPT

## 2021-10-26 PROCEDURE — 85025 COMPLETE CBC W/AUTO DIFF WBC: CPT

## 2021-10-26 PROCEDURE — 87086 URINE CULTURE/COLONY COUNT: CPT

## 2021-10-26 RX ORDER — ETODOLAC 400 MG/1
TABLET, FILM COATED ORAL
Qty: 90 TABLET | Refills: 1 | Status: SHIPPED | OUTPATIENT
Start: 2021-10-26 | End: 2022-01-01 | Stop reason: SDUPTHER

## 2021-10-28 ENCOUNTER — TELEPHONE (OUTPATIENT)
Dept: UROLOGY | Age: 51
End: 2021-10-28

## 2021-10-28 LAB
ORGANISM: ABNORMAL
URINE CULTURE, ROUTINE: ABNORMAL

## 2021-10-28 RX ORDER — CEPHALEXIN 500 MG/1
500 CAPSULE ORAL 3 TIMES DAILY
Qty: 21 CAPSULE | Refills: 0 | Status: SHIPPED | OUTPATIENT
Start: 2021-11-07 | End: 2021-11-14

## 2021-10-28 NOTE — TELEPHONE ENCOUNTER
Patient informed to  the Keflex and start 3 days prior to the surgery.  Patient voiced understanding

## 2021-11-04 NOTE — PROGRESS NOTES

## 2021-11-10 ENCOUNTER — HOSPITAL ENCOUNTER (OUTPATIENT)
Age: 51
Setting detail: OUTPATIENT SURGERY
Discharge: HOME | End: 2021-11-10
Attending: UROLOGY | Admitting: UROLOGY
Payer: MEDICAID

## 2021-11-10 ENCOUNTER — ANESTHESIA (OUTPATIENT)
Dept: OPERATING ROOM | Age: 51
End: 2021-11-10
Payer: MEDICAID

## 2021-11-10 ENCOUNTER — ANESTHESIA EVENT (OUTPATIENT)
Dept: OPERATING ROOM | Age: 51
End: 2021-11-10
Payer: MEDICAID

## 2021-11-10 VITALS
SYSTOLIC BLOOD PRESSURE: 149 MMHG | TEMPERATURE: 97.1 F | OXYGEN SATURATION: 97 % | DIASTOLIC BLOOD PRESSURE: 80 MMHG | RESPIRATION RATE: 16 BRPM | BODY MASS INDEX: 25.2 KG/M2 | HEART RATE: 68 BPM | WEIGHT: 180 LBS | HEIGHT: 71 IN

## 2021-11-10 VITALS — TEMPERATURE: 96.8 F | OXYGEN SATURATION: 99 % | SYSTOLIC BLOOD PRESSURE: 108 MMHG | DIASTOLIC BLOOD PRESSURE: 70 MMHG

## 2021-11-10 DIAGNOSIS — G89.18 POST-OP PAIN: Primary | ICD-10-CM

## 2021-11-10 PROCEDURE — 6360000002 HC RX W HCPCS: Performed by: STUDENT IN AN ORGANIZED HEALTH CARE EDUCATION/TRAINING PROGRAM

## 2021-11-10 PROCEDURE — 3700000000 HC ANESTHESIA ATTENDED CARE: Performed by: UROLOGY

## 2021-11-10 PROCEDURE — 6360000002 HC RX W HCPCS: Performed by: NURSE ANESTHETIST, CERTIFIED REGISTERED

## 2021-11-10 PROCEDURE — 3700000001 HC ADD 15 MINUTES (ANESTHESIA): Performed by: UROLOGY

## 2021-11-10 PROCEDURE — 2709999900 HC NON-CHARGEABLE SUPPLY: Performed by: UROLOGY

## 2021-11-10 PROCEDURE — 3600000003 HC SURGERY LEVEL 3 BASE: Performed by: UROLOGY

## 2021-11-10 PROCEDURE — 7100000011 HC PHASE II RECOVERY - ADDTL 15 MIN: Performed by: UROLOGY

## 2021-11-10 PROCEDURE — 2720000010 HC SURG SUPPLY STERILE: Performed by: UROLOGY

## 2021-11-10 PROCEDURE — 3600000013 HC SURGERY LEVEL 3 ADDTL 15MIN: Performed by: UROLOGY

## 2021-11-10 PROCEDURE — 6360000002 HC RX W HCPCS

## 2021-11-10 PROCEDURE — 88305 TISSUE EXAM BY PATHOLOGIST: CPT

## 2021-11-10 PROCEDURE — 7100000010 HC PHASE II RECOVERY - FIRST 15 MIN: Performed by: UROLOGY

## 2021-11-10 PROCEDURE — 7100000001 HC PACU RECOVERY - ADDTL 15 MIN: Performed by: UROLOGY

## 2021-11-10 PROCEDURE — 7100000000 HC PACU RECOVERY - FIRST 15 MIN: Performed by: UROLOGY

## 2021-11-10 PROCEDURE — 2580000003 HC RX 258

## 2021-11-10 RX ORDER — ONDANSETRON 2 MG/ML
INJECTION INTRAMUSCULAR; INTRAVENOUS PRN
Status: DISCONTINUED | OUTPATIENT
Start: 2021-11-10 | End: 2021-11-10 | Stop reason: SDUPTHER

## 2021-11-10 RX ORDER — PROPOFOL 10 MG/ML
INJECTION, EMULSION INTRAVENOUS PRN
Status: DISCONTINUED | OUTPATIENT
Start: 2021-11-10 | End: 2021-11-10 | Stop reason: SDUPTHER

## 2021-11-10 RX ORDER — HYDROCODONE BITARTRATE AND ACETAMINOPHEN 5; 325 MG/1; MG/1
TABLET ORAL
Status: DISCONTINUED
Start: 2021-11-10 | End: 2021-11-10 | Stop reason: HOSPADM

## 2021-11-10 RX ORDER — FENTANYL CITRATE 50 UG/ML
50 INJECTION, SOLUTION INTRAMUSCULAR; INTRAVENOUS EVERY 5 MIN PRN
Status: COMPLETED | OUTPATIENT
Start: 2021-11-10 | End: 2021-11-10

## 2021-11-10 RX ORDER — HYDROCODONE BITARTRATE AND ACETAMINOPHEN 5; 325 MG/1; MG/1
1 TABLET ORAL ONCE
Status: DISCONTINUED | OUTPATIENT
Start: 2021-11-10 | End: 2021-11-10 | Stop reason: HOSPADM

## 2021-11-10 RX ORDER — MIDAZOLAM HYDROCHLORIDE 1 MG/ML
INJECTION INTRAMUSCULAR; INTRAVENOUS PRN
Status: DISCONTINUED | OUTPATIENT
Start: 2021-11-10 | End: 2021-11-10 | Stop reason: SDUPTHER

## 2021-11-10 RX ORDER — FENTANYL CITRATE 50 UG/ML
INJECTION, SOLUTION INTRAMUSCULAR; INTRAVENOUS
Status: COMPLETED
Start: 2021-11-10 | End: 2021-11-10

## 2021-11-10 RX ORDER — LIDOCAINE HYDROCHLORIDE 20 MG/ML
INJECTION, SOLUTION INTRAVENOUS PRN
Status: DISCONTINUED | OUTPATIENT
Start: 2021-11-10 | End: 2021-11-10 | Stop reason: SDUPTHER

## 2021-11-10 RX ORDER — HYDROCODONE BITARTRATE AND ACETAMINOPHEN 5; 325 MG/1; MG/1
1 TABLET ORAL EVERY 6 HOURS PRN
Qty: 15 TABLET | Refills: 0 | Status: SHIPPED | OUTPATIENT
Start: 2021-11-10 | End: 2021-11-15

## 2021-11-10 RX ORDER — SODIUM CHLORIDE 9 MG/ML
INJECTION, SOLUTION INTRAVENOUS CONTINUOUS
Status: DISCONTINUED | OUTPATIENT
Start: 2021-11-10 | End: 2021-11-10 | Stop reason: HOSPADM

## 2021-11-10 RX ORDER — FENTANYL CITRATE 50 UG/ML
INJECTION, SOLUTION INTRAMUSCULAR; INTRAVENOUS PRN
Status: DISCONTINUED | OUTPATIENT
Start: 2021-11-10 | End: 2021-11-10 | Stop reason: SDUPTHER

## 2021-11-10 RX ADMIN — FENTANYL CITRATE 50 MCG: 50 INJECTION, SOLUTION INTRAMUSCULAR; INTRAVENOUS at 13:28

## 2021-11-10 RX ADMIN — Medication 0.5 MG: at 13:59

## 2021-11-10 RX ADMIN — SODIUM CHLORIDE: 9 INJECTION, SOLUTION INTRAVENOUS at 12:09

## 2021-11-10 RX ADMIN — FENTANYL CITRATE 50 MCG: 50 INJECTION, SOLUTION INTRAMUSCULAR; INTRAVENOUS at 13:42

## 2021-11-10 RX ADMIN — PROPOFOL 200 MG: 10 INJECTION, EMULSION INTRAVENOUS at 12:32

## 2021-11-10 RX ADMIN — FENTANYL CITRATE 50 MCG: 50 INJECTION, SOLUTION INTRAMUSCULAR; INTRAVENOUS at 13:47

## 2021-11-10 RX ADMIN — FENTANYL CITRATE 50 MCG: 50 INJECTION, SOLUTION INTRAMUSCULAR; INTRAVENOUS at 12:36

## 2021-11-10 RX ADMIN — ONDANSETRON HYDROCHLORIDE 4 MG: 4 INJECTION, SOLUTION INTRAMUSCULAR; INTRAVENOUS at 12:32

## 2021-11-10 RX ADMIN — FENTANYL CITRATE 50 MCG: 50 INJECTION, SOLUTION INTRAMUSCULAR; INTRAVENOUS at 12:26

## 2021-11-10 RX ADMIN — CEFAZOLIN 2000 MG: 10 INJECTION, POWDER, FOR SOLUTION INTRAVENOUS at 12:27

## 2021-11-10 RX ADMIN — LIDOCAINE HYDROCHLORIDE 100 MG: 20 INJECTION, SOLUTION INTRAVENOUS at 12:32

## 2021-11-10 RX ADMIN — HYDROMORPHONE HYDROCHLORIDE 0.5 MG: 1 INJECTION, SOLUTION INTRAMUSCULAR; INTRAVENOUS; SUBCUTANEOUS at 13:59

## 2021-11-10 RX ADMIN — MIDAZOLAM 2 MG: 1 INJECTION INTRAMUSCULAR; INTRAVENOUS at 12:26

## 2021-11-10 RX ADMIN — FENTANYL CITRATE 50 MCG: 50 INJECTION, SOLUTION INTRAMUSCULAR; INTRAVENOUS at 13:35

## 2021-11-10 ASSESSMENT — PULMONARY FUNCTION TESTS
PIF_VALUE: 1
PIF_VALUE: 16
PIF_VALUE: 13
PIF_VALUE: 22
PIF_VALUE: 16
PIF_VALUE: 13
PIF_VALUE: 2
PIF_VALUE: 2
PIF_VALUE: 16
PIF_VALUE: 2
PIF_VALUE: 11
PIF_VALUE: 1
PIF_VALUE: 1
PIF_VALUE: 13
PIF_VALUE: 3
PIF_VALUE: 13
PIF_VALUE: 13
PIF_VALUE: 3
PIF_VALUE: 16
PIF_VALUE: 16
PIF_VALUE: 13
PIF_VALUE: 16
PIF_VALUE: 0
PIF_VALUE: 12
PIF_VALUE: 2
PIF_VALUE: 3
PIF_VALUE: 13
PIF_VALUE: 1
PIF_VALUE: 13
PIF_VALUE: 1
PIF_VALUE: 3
PIF_VALUE: 16
PIF_VALUE: 2
PIF_VALUE: 13

## 2021-11-10 ASSESSMENT — PAIN SCALES - GENERAL
PAINLEVEL_OUTOF10: 7
PAINLEVEL_OUTOF10: 5
PAINLEVEL_OUTOF10: 7
PAINLEVEL_OUTOF10: 7
PAINLEVEL_OUTOF10: 0
PAINLEVEL_OUTOF10: 2
PAINLEVEL_OUTOF10: 2
PAINLEVEL_OUTOF10: 7
PAINLEVEL_OUTOF10: 7
PAINLEVEL_OUTOF10: 2

## 2021-11-10 ASSESSMENT — PAIN DESCRIPTION - PAIN TYPE: TYPE: SURGICAL PAIN

## 2021-11-10 ASSESSMENT — PAIN DESCRIPTION - LOCATION: LOCATION: PENIS

## 2021-11-10 ASSESSMENT — LIFESTYLE VARIABLES: SMOKING_STATUS: 1

## 2021-11-10 NOTE — OP NOTE
Dr. Adrian Burton MD  Urologic Surgery      [address] [place] [address]. [place]  11/10/21    Patient:  Jerzy Meade  MRN: [id number]  YOB: 1970    Surgeon: Dr. Adrian Burton MD  Assistant: None    Pre-op Diagnosis: Bladder neck contracture  Post-op Diagnosis: Same    Procedure:   Cystoscopy with Trans-Urethral Resection of bladder neck contracture. Gayle catheter placement. Anesthesia: General  Complications: None  OR Blood Loss:  Minimal  Fluids: Crystalloids  Drains: 22F 3-Way Gayle  Specimens:  ID Type Source Tests Collected by Time Destination   A : Prostate tissue Tissue Prostate SURGICAL PATHOLOGY Adrian Burton MD 11/10/2021 1155        Indication:  Patient is a 76-year-old male with a history of TURP. He was doing excellent until his urinary stream became narrow again. Cystoscopy in the office demonstrated bladder neck narrowing. We discussed resection of the thyroid to render his bladder neck widely patent again. After risks and benefits were explained he elected to proceed with today's procedure. Narrative of the Procedure:    After informed consent was obtained, the patient was taken to the operating room. He was transferred to the operating table in the supine position. EPC cuffs were place on his legs, and the machine was turned on. Anesthesia was induced and antibiotics were given. He was placed in a modified dorsal lithotomy position. He was sterilely prepped and draped in a standard fashion. A timeout occurred in which 2 patient identifiers were used. We entered his urethra with a 25F scope with the visual obturator in place. Rhenda Sayra Urethral dilation was not required to achieve this. Once within the prostatic urethra, we changed out into our working loop. There was a posterior bladder neck narrowing. The anterior bladder neck was unremarkable and healthy. I resected from the 3 to 9 o'clock position incising laterally to open and remove all scar tissue.   I then cauterized to prevent postoperative bleeding. All chips were removed. Repeat cystoscopy demonstrated no further chips and no bleeding. A 22F 3-Way Gayle catheter was placed into the bladder. 30 mL of sterile water was place in the balloon. The patient was hooked up to gentle bladder irrigation. He was then awakened and transferred back to his hospital bed in good and stable condition. Follow-Up: Patient needs his catheter removed in the office tomorrow. He should see one of our nurse practitioners back in 4 to 6 weeks.     Sobia Hopper MD  Electronically signed on 11/10/2021 at 12:59 PM

## 2021-11-10 NOTE — H&P
Dr. Reggie De La Fuente MD  Surgery [facility])  Urology Clinic      Patient:  Patrica Haynes  YOB: 1970  Date: 11/10/2021    HISTORY OF PRESENT ILLNESS:   The patient is a 45 y.o. male who presents today for evaluation of the following problem(s): BNC following TURP    Overall the problem(s) : are stable. Associated Symptoms: No dysuria, gross hematuria. Pain Severity: 0/10    Summary of old records:   Took flomax. Imaging/Labs reviewed during today's visit:  I have independently reviewed and verified the following films during today's visit.   None    Last several PSA's:  No results found for: PSA    Last total testosterone:  No results found for: TESTOSTERONE    Urinalysis today:  Results for POC orders placed in visit on 07/21/21   POCT Urinalysis No Micro (Auto)   Result Value Ref Range    Glucose, Ur Negative NEGATIVE mg/dl    Bilirubin Urine Negative     Ketones, Urine Negative NEGATIVE    Specific Gravity, Urine 1.025 1.002 - 1.030    Blood, UA POC Large (A) NEGATIVE    pH, Urine 5.50 5.0 - 9.0    Protein, Urine 100 (A) NEGATIVE mg/dl    Urobilinogen, Urine 1.00 0.0 - 1.0 eu/dl    Nitrite, Urine Negative NEGATIVE    Leukocyte Clumps, Urine Moderate (A) NEGATIVE    Color, Urine Yellow YELLOW-STRAW    Character, Urine Clear [id number]         Last BUN and creatinine:  Lab Results   Component Value Date    BUN 14 10/26/2021     Lab Results   Component Value Date    CREATININE 1.1 10/26/2021       Imaging Reviewed during this Office Visit:   (results were independently reviewed by physician and radiology report verified)    PAST MEDICAL, FAMILY AND SOCIAL HISTORY:  Past Medical History:   Diagnosis Date    Cerebral artery occlusion with cerebral infarction ([facility] [address]     PT STATES TIA    Chronic back pain     DDD (degenerative disc disease), lumbar     Enlarged prostate     Fibromyalgia     Hyperlipidemia     Hypertension     Kidney stones     Lumbago      Past Surgical History:   Procedure Laterality Date    COLONOSCOPY      LITHOTRIPSY      PAIN MANAGEMENT PROCEDURE N/A 1/5/2021    LESI L5 #1 performed by Eben Jordan MD at [facility] N/A 3/26/2021    LESI # 2 @ L4 performed by Eben Jordan MD at [facility] [address] Bilateral 5/3/2021    bilateral L-facet MBB # 1 @ L3-4, L4-5 performed by Eben Jordan MD at [place] [address]  2017    TURP N/A 5/26/2021    CYSTOSCOPY TRANSURETHRAL RESECTION PROSTATE performed by Sofi Navarro MD at [address] [place] [address] History   Problem Relation Age of Onset    Cancer Mother     Diabetes Mother     Heart Disease Neg Hx     High Blood Pressure Neg Hx     Stroke Neg Hx      Outpatient Medications Marked as Taking for the 11/10/21 encounter [facility] Encounter)   Medication Sig Dispense Refill    cephALEXin (KEFLEX) 500 MG capsule Take 1 capsule by mouth 3 times daily for 7 days 21 capsule 0    etodolac (LODINE) 400 MG tablet TAKE 1 TABLET BY MOUTH THREE TIMES A DAY 90 tablet 1    gabapentin (NEURONTIN) 300 MG capsule Take 1 capsule by mouth 3 times daily for 30 days. 90 capsule 0    DULoxetine (CYMBALTA) 30 MG extended release capsule 2 times daily Pt takes 60mg in am and 30mg in the pm      atorvastatin (LIPITOR) 20 MG tablet Take 20 mg by mouth daily      traZODone (DESYREL) 50 MG tablet Take 50 mg by mouth nightly as needed       tamsulosin (FLOMAX) 0.4 MG capsule Take 0.4 mg by mouth 2 times daily 2 tabs         Patient has no known allergies.   Social History     Tobacco Use   Smoking Status Current Every Day Smoker    Packs/day: 0.50    Types: Cigarettes   Smokeless Tobacco Never Used       Social History     Substance and Sexual Activity   Alcohol Use Not Currently       REVIEW OF SYSTEMS:  Constitutional: negative  Eyes: negative  Respiratory: negative  Cardiovascular: negative  Gastrointestinal: negative  Musculoskeletal: negative  Genitourinary: negative except for what is in HPI  Skin: negative   Neurological: negative  Hematological/Lymphatic: negative  Psychological: negative    Physical Exam:    This a 45 y.o. male   Vitals:    11/10/21 1142   BP: 112/75   Pulse: 56   Resp: 12   Temp: 98 °F (36.7 °C)   SpO2: 97%     Constitutional: Patient in no acute distress; Neuro: alert and oriented to person place and time. Psych: Mood and affect normal.  Skin: Normal  Lungs: Respiratory effort normal  Cardiovascular:  Normal peripheral pulses  Abdomen: Soft, non-tender, non-distended with no CVA, flank pain, hepatosplenomegaly or hernia. Kidneys normal.  Bladder non-tender and not distended. Lymphatics: no palpable lymphadenopathy      Assessment and Plan      Bladder neck contracture       Plan:      No follow-ups on file.   Lawernce Click         Dr. Abraham Rosado MD

## 2021-11-10 NOTE — PROGRESS NOTES
ADMITTED TO [facility] AND ORIENTED TO UNIT. SCDS ON. FALL BAND ON. PT VERBALIZED APPROVAL FOR FIRST NAME, LAST INITIAL AND PHYSICIAN NAME ON UNIT WHITEBOARD. Wife, Kimmy Coulter with the patient.

## 2021-11-10 NOTE — ANESTHESIA POSTPROCEDURE EVALUATION
Department of Anesthesiology  Postprocedure Note    Patient: Oliver Delgado  MRN: [id number]  YOB: 1970  Date of evaluation: 11/10/2021  Time:  6:25 PM     Procedure Summary     Date: 11/10/21 Room / Location: [address] [place] [address]    Anesthesia Start: 0759 Anesthesia Stop: 7497    Procedure: CYSTOSCOPY TRANSURETHRAL RESECTION PROSTATE TRANSURETHERAL INCISION OF BLADDER NECK CONTRACTURE (N/A ) Diagnosis: (BHP)    Surgeons: Anders Garnica MD Responsible Provider: Pedro Moscoso DO    Anesthesia Type: General ASA Status: 3          Anesthesia Type: General    Gerard Phase I: Gerard Score: 9    Gerard Phase II: Gerard Score: 10    Last vitals: Reviewed and per EMR flowsheets.        Anesthesia Post Evaluation    Patient location during evaluation: PACU  Patient participation: complete - patient participated  Level of consciousness: awake and alert  Airway patency: patent  Nausea & Vomiting: no vomiting and no nausea  Complications: no  Cardiovascular status: hemodynamically stable  Respiratory status: acceptable  Hydration status: stable

## 2021-11-10 NOTE — PROGRESS NOTES
1306: Pt arrived to pacu, unresponsive post anesthesia. 4L NC and opa in place. CBI in place, colorless urine noted in bag. Respirations easy and unlabored   1315: Pt awakens on his own, opa removed. VSS  1328: Pt c/o pain 7/10, 50mcg of fentanyl administered to pt   1335: No change in pain, another 50mcg of fentanyl given to pt   1342: No change in pain, 50mcg of fentanyl given  1347: 50mcg of fentanyl given to pt.   1359: Pt states pain continues to be a 7/10, 0.5mg of dilaudid administered to pt.   1405: Pt states pain is 5/10 and tolerable. Denies need for more pain medication. 1419: Pt meets criteria for discharge from pacu, transported to [facility] in stable condition.  VSS

## 2021-11-10 NOTE — ANESTHESIA PRE PROCEDURE
Department of Anesthesiology  Preprocedure Note       Name:  Aylin Enamorado   Age:  45 y.o.  :  1970                                          MRN:  [id number]         Date:  11/10/2021      Surgeon: Dougie Cottrell):  Mikel Greenfield MD    Procedure: Procedure(s):  CYSTOSCOPY TRANSURETHRAL RESECTION PROSTATE TRANSURETHERAL INCISION OF BLADDER NECK CONTRACTURE    Medications prior to admission:   Prior to Admission medications    Medication Sig Start Date End Date Taking? Authorizing Provider   cephALEXin (KEFLEX) 500 MG capsule Take 1 capsule by mouth 3 times daily for 7 days 21  Yolanda Mix APRN - CNP   etodolac (LODINE) 400 MG tablet TAKE 1 TABLET BY MOUTH THREE TIMES A DAY 10/26/21 11/19/21  Aldona Axe, APRN - CNP   gabapentin (NEURONTIN) 300 MG capsule Take 1 capsule by mouth 3 times daily for 30 days. 10/24/21 11/23/21  Aldona Axe, APRN - CNP   DULoxetine (CYMBALTA) 30 MG extended release capsule 2 times daily Pt takes 60mg in am and 30mg in the pm 21   Historical Provider, MD   atorvastatin (LIPITOR) 20 MG tablet Take 20 mg by mouth daily    Historical Provider, MD   traZODone (DESYREL) 50 MG tablet Take 50 mg by mouth nightly as needed  21   Historical Provider, MD   tamsulosin (FLOMAX) 0.4 MG capsule Take 0.4 mg by mouth 2 times daily 2 tabs 20   Historical Provider, MD       Current medications:    No current facility-administered medications for this visit. No current outpatient medications on file.      Facility-Administered Medications Ordered in Other Visits   Medication Dose Route Frequency Provider Last Rate Last Admin    0.9 % sodium chloride infusion   IntraVENous Continuous Nam Mac CMA (AAMA) 100 mL/hr at 11/10/21 1209 New Bag at 11/10/21 1209    midazolam (VERSED) injection   IntraVENous PRN Sydelle Toro, APRN - CRNA   2 mg at 11/10/21 1226    fentaNYL (SUBLIMAZE) injection   IntraVENous PRN Sydelle Toro, APRN - CRNA   50 mcg at 11/10/21 1236    ondansetron (ZOFRAN) injection   IntraVENous PRN Alberto Dapper, APRN - CRNA   4 mg at 11/10/21 1232    propofol injection   IntraVENous PRN Alberto Dapper, APRN - CRNA   200 mg at 11/10/21 1232    lidocaine (cardiac) (XYLOCAINE) injection   IntraVENous PRN Alberto Dapper, APRN - CRNA   100 mg at 11/10/21 1232       Allergies:  No Known Allergies    Problem List:    Patient Active Problem List   Diagnosis Code    Lumbar radiculopathy M54.16    Spinal stenosis of lumbar region without neurogenic claudication M48.061    Lumbar spondylosis M47.816    Hypertrophy of prostate with urinary obstruction N40.1, N13.8    Hematuria R31.9       Past Medical History:        Diagnosis Date    Cerebral artery occlusion with cerebral infarction ([facility] [address]     PT STATES TIA    Chronic back pain     DDD (degenerative disc disease), lumbar     Enlarged prostate     Fibromyalgia     Hyperlipidemia     Hypertension     Kidney stones     Lumbago        Past Surgical History:        Procedure Laterality Date    COLONOSCOPY      LITHOTRIPSY      PAIN MANAGEMENT PROCEDURE N/A 1/5/2021    LESI L5 #1 performed by Sherren Makua, MD at [address] [place] [address] PAIN MANAGEMENT PROCEDURE N/A 3/26/2021    LESI # 2 @ L4 performed by Sherren Makua, MD at [facility] Bilateral 5/3/2021    bilateral L-facet MBB # 1 @ L3-4, L4-5 performed by Sherren Makua, MD at [place] [address]  2017    TURP N/A 5/26/2021    CYSTOSCOPY TRANSURETHRAL RESECTION PROSTATE performed by Darío Matute MD at [facility] History:    Social History     Tobacco Use    Smoking status: Current Every Day Smoker     Packs/day: 0.50     Types: Cigarettes    Smokeless tobacco: Never Used   Substance Use Topics    Alcohol use: Not Currently                                Ready to quit: Not Answered  Counseling given: Not Answered      Vital Signs (Current): There were no vitals filed for this visit. BP Readings from Last 3 Encounters:   11/10/21 112/75   11/10/21 106/69   10/16/21 (!) 149/92       NPO Status:                                                                                 BMI:   Wt Readings from Last 3 Encounters:   11/04/21 180 lb (81.6 kg)   10/20/21 182 lb (82.6 kg)   10/16/21 180 lb (81.6 kg)     There is no height or weight on file to calculate BMI.    CBC:   Lab Results   Component Value Date    WBC 12.9 10/26/2021    RBC 5.42 10/26/2021    HGB 16.7 10/26/2021    HCT 50.4 10/26/2021    MCV 93.0 10/26/2021    RDW 12.6 03/10/2021     10/26/2021       CMP:   Lab Results   Component Value Date     10/26/2021    K 4.6 10/26/2021    K 5.2 05/27/2021     10/26/2021    CO2 28 10/26/2021    BUN 14 10/26/2021    CREATININE 1.1 10/26/2021    GFRAA >60 03/10/2021    LABGLOM 70 10/26/2021    GLUCOSE 93 10/26/2021    PROT 6.7 03/10/2021    CALCIUM 9.4 10/26/2021    BILITOT 1.04 03/10/2021    ALKPHOS 62 03/10/2021    AST 17 03/10/2021    ALT 13 03/10/2021       POC Tests: No results for input(s): POCGLU, POCNA, POCK, POCCL, POCBUN, POCHEMO, POCHCT in the last 72 hours.     Coags:   Lab Results   Component Value Date    INR 1.03 10/29/2020       HCG (If Applicable): No results found for: PREGTESTUR, PREGSERUM, HCG, HCGQUANT     ABGs: No results found for: PHART, PO2ART, OVG4WUA, JGR1MOJ, BEART, I5BNXECY     Type & Screen (If Applicable):  No results found for: LABABO, LABRH    Drug/Infectious Status (If Applicable):  No results found for: HIV, HEPCAB    COVID-19 Screening (If Applicable): No results found for: COVID19        Anesthesia Evaluation  Patient summary reviewed no history of anesthetic complications:   Airway: Mallampati: III  TM distance: >3 FB   Neck ROM: full  Mouth opening: > = 3 FB Dental:          Pulmonary:normal exam    (+) current smoker    (-) COPD and asthma          Patient smoked on day of surgery. Cardiovascular:  Exercise tolerance: good (>4 METS),   (+) hypertension:,     (-) past MI    ECG reviewed                        Neuro/Psych:   (+) CVA (right facial droop): residual symptoms, neuromuscular disease:,             GI/Hepatic/Renal:        (-) GERD, liver disease and no renal disease       Endo/Other:        (-) diabetes mellitus, hypothyroidism, hyperthyroidism               Abdominal:             Vascular: Other Findings:               Anesthesia Plan      general     ASA 3     (No data recorded  )  Induction: intravenous. MIPS: Postoperative opioids intended and Prophylactic antiemetics administered. Anesthetic plan and risks discussed with patient and spouse. Plan discussed with CRNA.                   Kasey Velazquez DO   11/10/2021

## 2021-11-11 ENCOUNTER — NURSE ONLY (OUTPATIENT)
Dept: UROLOGY | Age: 51
End: 2021-11-11

## 2021-11-11 DIAGNOSIS — N32.0 BLADDER NECK CONTRACTURE: ICD-10-CM

## 2021-11-11 PROCEDURE — 99999 PR OFFICE/OUTPT VISIT,PROCEDURE ONLY: CPT | Performed by: NURSE PRACTITIONER

## 2021-11-11 NOTE — PROGRESS NOTES
CYSTOSCOPY TRANSURETHRAL RESECTION PROSTATE TRANSURETHERAL INCISION OF BLADDER NECK CONTRACTURE on 11/10/21    Patient has given me verbal consent to perform ortega removal  Yes    30 cc of water deflated from ortega balloon. 22 Fr ortega removed without difficulty. Pt will drink fluids and call in 4-6 hours if patient has not urinated. F/u with provider Alexys Kapoor CNP in4 weeks.

## 2021-11-17 RX ORDER — GABAPENTIN 300 MG/1
300 CAPSULE ORAL 3 TIMES DAILY
Qty: 90 CAPSULE | Refills: 0 | Status: SHIPPED | OUTPATIENT
Start: 2021-11-20 | End: 2021-12-27

## 2021-12-09 ENCOUNTER — OFFICE VISIT (OUTPATIENT)
Dept: UROLOGY | Age: 51
End: 2021-12-09

## 2021-12-09 VITALS
SYSTOLIC BLOOD PRESSURE: 150 MMHG | WEIGHT: 187.5 LBS | BODY MASS INDEX: 26.25 KG/M2 | HEIGHT: 71 IN | DIASTOLIC BLOOD PRESSURE: 72 MMHG

## 2021-12-09 DIAGNOSIS — N39.0 URINARY TRACT INFECTION WITHOUT HEMATURIA, SITE UNSPECIFIED: ICD-10-CM

## 2021-12-09 DIAGNOSIS — N32.0 BLADDER NECK CONTRACTURE: Primary | ICD-10-CM

## 2021-12-09 DIAGNOSIS — R39.15 URINARY URGENCY: ICD-10-CM

## 2021-12-09 PROCEDURE — 99024 POSTOP FOLLOW-UP VISIT: CPT | Performed by: NURSE PRACTITIONER

## 2021-12-09 RX ORDER — SOLIFENACIN SUCCINATE 10 MG/1
10 TABLET, FILM COATED ORAL DAILY
Qty: 30 TABLET | Refills: 5 | Status: SHIPPED | OUTPATIENT
Start: 2021-12-09 | End: 2022-01-01 | Stop reason: SDUPTHER

## 2021-12-09 NOTE — PROGRESS NOTES
MANAGEMENT PROCEDURE N/A 1/5/2021    LESI L5 #1 performed by Eben Jordan MD at [address] N/A 3/26/2021    LESI # 2 @ L4 performed by Eben Jordan MD at [facility] 113 Bilateral 5/3/2021    bilateral L-facet MBB # 1 @ L3-4, L4-5 performed by Eben Jordan MD at [place] [address]  2017    TURP N/A 5/26/2021    CYSTOSCOPY TRANSURETHRAL RESECTION PROSTATE performed by Sofi Navarro MD at [address] [place] [address] TURP N/A 11/10/2021    CYSTOSCOPY TRANSURETHRAL RESECTION PROSTATE TRANSURETHERAL INCISION OF BLADDER NECK CONTRACTURE performed by Sofi Navarro MD at [facility] History   Problem Relation Age of Onset    Cancer Mother     Diabetes Mother     Heart Disease Neg Hx     High Blood Pressure Neg Hx     Stroke Neg Hx      Outpatient Medications Marked as Taking for the 12/9/21 encounter (Office Visit) with ANGELINA Uribe CNP   Medication Sig Dispense Refill    solifenacin (VESICARE) 10 MG tablet Take 1 tablet by mouth daily 30 tablet 5       Patient has no known allergies. Social History     Tobacco Use   Smoking Status Current Every Day Smoker    Packs/day: 0.50    Types: Cigarettes   Smokeless Tobacco Never Used       Social History     Substance and Sexual Activity   Alcohol Use Not Currently       REVIEW OF SYSTEMS:  Constitutional: negative  Eyes: negative  Respiratory: negative  Cardiovascular: negative  Gastrointestinal: negative  Musculoskeletal: negative  Genitourinary: negative except for what is in HPI  Skin: negative   Neurological: negative  Hematological/Lymphatic: negative  Psychological: negative    Physical Exam:    This a 45 y.o. male   Vitals:    12/09/21 1347   BP: (!) 150/72     Constitutional: Patient in no acute distress; Neuro: alert and oriented to person place and time.     Psych: Mood and affect normal.  Skin: Normal  Lungs: Respiratory effort normal  Cardiovascular:  Normal peripheral pulses  Abdomen: Soft, non-tender, non-distended with no CVA, flank pain, hepatosplenomegaly or hernia. Kidneys normal.  Bladder non-tender and not distended. Lymphatics: no palpable lymphadenopathy      Assessment and Plan      1. Bladder neck contracture    2. Urinary tract infection without hematuria, site unspecified    3. Urinary urgency         Wean off of Flomax, can do daily. Vesicare for urinary urgency, stop Oxybutynin. Not working. Side effects. Avoid bladder irritants. Hx of UTI, urinary urgency, pain with voiding, get urine culture outpatient. Unable to void today.     FU 2 months with Dr Cage

## 2021-12-13 ENCOUNTER — TELEPHONE (OUTPATIENT)
Dept: UROLOGY | Age: 51
End: 2021-12-13

## 2021-12-13 NOTE — TELEPHONE ENCOUNTER
Prior Auth initiated for SOLIFENACIN . PA sent to [employer]. Should receive response in 3-5 days.

## 2021-12-15 NOTE — TELEPHONE ENCOUNTER
Emely     Could you add an addendum to your last visit note that he has stopped the oxybutynin. They are not wanting to approve the solifenacin because it still shows he is on oxybutynin. As soon as that is done then I can get it approved.       Thanks   [employer]

## 2021-12-27 RX ORDER — GABAPENTIN 300 MG/1
300 CAPSULE ORAL 3 TIMES DAILY
Qty: 90 CAPSULE | Refills: 1 | Status: SHIPPED | OUTPATIENT
Start: 2021-12-27 | End: 2022-04-20 | Stop reason: SDUPTHER

## 2022-01-03 RX ORDER — SOLIFENACIN SUCCINATE 10 MG/1
10 TABLET, FILM COATED ORAL DAILY
Qty: 30 TABLET | Refills: 5 | Status: SHIPPED | OUTPATIENT
Start: 2022-01-03 | End: 2022-04-29 | Stop reason: SDUPTHER

## 2022-01-03 NOTE — TELEPHONE ENCOUNTER
Martha Berrios called requesting a refill on the following medications:  Requested Prescriptions     Pending Prescriptions Disp Refills    solifenacin (VESICARE) 10 MG tablet 30 tablet 5     Sig: Take 1 tablet by mouth daily     Pharmacy verified:  .[email or web address]      Date of last visit:   Date of next visit (if applicable): 7/2/7535

## 2022-01-04 RX ORDER — ETODOLAC 400 MG/1
TABLET, FILM COATED ORAL
Qty: 90 TABLET | Refills: 1 | Status: SHIPPED | OUTPATIENT
Start: 2022-01-04 | End: 2022-04-18

## 2022-01-04 NOTE — TELEPHONE ENCOUNTER
OARRS reviewed. UDS: + for no recent screen . Last seen: 9/13/2021.  Follow-up:   Future Appointments   Date Time Provider Pooja Chicas   2/2/2022 10:00 AM ANGELINA Padilla - CNP N SRPX Pain [facility]   3/2/2022  2:15 PM Katherleen Ormond, MD [address] [place] [address]

## 2022-02-02 ENCOUNTER — VIRTUAL VISIT (OUTPATIENT)
Dept: PHYSICAL MEDICINE AND REHAB | Age: 52
End: 2022-02-02
Payer: MEDICAID

## 2022-02-02 DIAGNOSIS — G89.4 CHRONIC PAIN SYNDROME: ICD-10-CM

## 2022-02-02 DIAGNOSIS — M48.062 LUMBAR STENOSIS WITH NEUROGENIC CLAUDICATION: ICD-10-CM

## 2022-02-02 DIAGNOSIS — M47.819 FACET ARTHROPATHY OF SPINE: ICD-10-CM

## 2022-02-02 DIAGNOSIS — M54.17 LUMBOSACRAL RADICULITIS: ICD-10-CM

## 2022-02-02 DIAGNOSIS — M51.36 ANNULAR TEAR OF LUMBAR DISC: ICD-10-CM

## 2022-02-02 DIAGNOSIS — M47.816 SPONDYLOSIS OF LUMBAR REGION WITHOUT MYELOPATHY OR RADICULOPATHY: Primary | ICD-10-CM

## 2022-02-02 PROCEDURE — G8428 CUR MEDS NOT DOCUMENT: HCPCS | Performed by: NURSE PRACTITIONER

## 2022-02-02 PROCEDURE — 3017F COLORECTAL CA SCREEN DOC REV: CPT | Performed by: NURSE PRACTITIONER

## 2022-02-02 PROCEDURE — 99214 OFFICE O/P EST MOD 30 MIN: CPT | Performed by: NURSE PRACTITIONER

## 2022-02-02 RX ORDER — CYCLOBENZAPRINE HCL 5 MG
5 TABLET ORAL 3 TIMES DAILY PRN
Qty: 90 TABLET | Refills: 0 | Status: SHIPPED | OUTPATIENT
Start: 2022-02-02 | End: 2022-02-16

## 2022-02-02 ASSESSMENT — ENCOUNTER SYMPTOMS
BACK PAIN: 1
RESPIRATORY NEGATIVE: 1
GASTROINTESTINAL NEGATIVE: 1

## 2022-02-02 NOTE — PROGRESS NOTES
HPI:   Morales Sorenson is a 45 y.o. male is here today for    Chief Complaint: Low back pain, leg pain    HPI   Video Visit. TELEHEALTH EVALUATION -- Audio/Visual (During [facility] public health emergency). This visit was completed virtually using doxy. me. Location of visit. Patients home, providers home d/t inclement weather. 5 month FU. Continues to have pain in low back across- aching, electric pain, sharp, pulling pain. Continues to have pain in legs and toes some numbness and tingling   Pain is most bothersome across lower back  Did not get L-facet MBB completed that was ordered last visit d/t provider being off. Continues Neurontin 300 mg in am and afternoon and 600 mg at bedtime  Continues to use marijuana which does help   Had a bladder procedure and issues when nurse pulled out ortega catheter and has groin pain where he feels that he has been kicked when he goes to bathroom and is having incontinence and is following with urology. Pain increases with bending, lifting, twisting , turning torso, reaching, pushing, getting up and down and housework or working at job. Medications reviewed. Patient denies side effects with medications. Patient states he is taking medications as prescribed. Hedenies receiving pain medications from other sources. He denies any ER visits since last visit. Pain scale with out pain medications or at its worst is 5-7/10. Last dose of Neurontin was yesterday        The patienthas No Known Allergies. Subjective:      Review of Systems   Constitutional: Negative. HENT: Negative. Respiratory: Negative. Cardiovascular: Negative. Gastrointestinal: Negative. Genitourinary: Positive for difficulty urinating and dysuria. Incontinence   Musculoskeletal: Positive for arthralgias, back pain and myalgias. Neurological: Positive for weakness and numbness. Psychiatric/Behavioral: Positive for sleep disturbance. The patient is not nervous/anxious. Objective: There were no vitals filed for this visit. Physical Exam  Constitutional:       Appearance: Normal appearance. HENT:      Head: Normocephalic and atraumatic. Neurological:      General: No focal deficit present. Mental Status: He is alert and oriented to person, place, and time. Psychiatric:         Mood and Affect: Mood normal.         Behavior: Behavior normal.          Assessment:     1. Spondylosis of lumbar region without myelopathy or radiculopathy    2. Facet arthropathy of spine    3. Lumbosacral radiculitis    4. Lumbar stenosis with neurogenic claudication    5. Annular tear of lumbar disc    6. Chronic pain syndrome            Plan:      · OARRS reviewed. Current MED: 0  · Patient was not offered naloxone for home. · Discussed long term side effects of medications, tolerance, dependency and addiction. · Previous UDS reviewed  · UDS preformed today for compliance. · Patient told can not receive any pain medications from any other source. · No evidence of abuse, diversion or aberrant behavior.  Medications and/or procedures to improve function and quality of life- patient understanding with this and that may not be pain free   Discussed with patient about safe storage of medications at home   Discussed possible weaning of medication dosing dependent on treatment/procedure results.  Discussed with patient about risks with procedure including infection, reaction to medication, increased pain, or bleeding.  Reviewed EMG and Dr. Robles Dry note again  · Procedure notes reveiwed in detail  · Received 80% relief of pain across lower back pain for over 1 week from bilateral L-facet MBB # 1 with improvement in mobility. · Plan  for diagnostic purpose. Procedure and risks discussed in detail with patient. · Neurontin 300 mg in am and afternoon and 600 at bedtime- filled 1/30/2022   · Resume flexeril 5 mg TID prn - ordered refill  · Uses THC- no opioids       Meds. Prescribed:   Orders Placed This Encounter   Medications    cyclobenzaprine (FLEXERIL) 5 MG tablet     Sig: Take 1 tablet by mouth 3 times daily as needed for Muscle spasms     Dispense:  90 tablet     Refill:  0       Return for bilateral L-facet MBB # 2 @ L3-4, L4-5. , follow up after procedure.                Electronically signed by ANGELINA Miller CNP on2/2/2022 at 10:50 AM

## 2022-02-07 ENCOUNTER — TELEPHONE (OUTPATIENT)
Dept: PHYSICAL MEDICINE AND REHAB | Age: 52
End: 2022-02-07

## 2022-02-16 RX ORDER — CYCLOBENZAPRINE HCL 5 MG
TABLET ORAL
Qty: 90 TABLET | Refills: 0 | Status: SHIPPED | OUTPATIENT
Start: 2022-02-16 | End: 2022-04-28 | Stop reason: SDUPTHER

## 2022-02-16 NOTE — TELEPHONE ENCOUNTER
OARRS reviewed. UDS: + for  [facility]- known user, [employer]  Last seen: 2/2/2022.  Follow-up:   Future Appointments   Date Time Provider Pooja Chicas   3/2/2022  2:15 PM Ilana Wood, [address] [facility]

## 2022-03-02 ENCOUNTER — OFFICE VISIT (OUTPATIENT)
Dept: UROLOGY | Age: 52
End: 2022-03-02
Payer: MEDICAID

## 2022-03-02 VITALS — HEIGHT: 71 IN | WEIGHT: 187 LBS | BODY MASS INDEX: 26.18 KG/M2 | RESPIRATION RATE: 16 BRPM

## 2022-03-02 DIAGNOSIS — N32.81 OAB (OVERACTIVE BLADDER): ICD-10-CM

## 2022-03-02 DIAGNOSIS — N32.0 BLADDER NECK CONTRACTURE: Primary | ICD-10-CM

## 2022-03-02 DIAGNOSIS — R30.0 DYSURIA: ICD-10-CM

## 2022-03-02 DIAGNOSIS — N40.1 HYPERTROPHY OF PROSTATE WITH URINARY OBSTRUCTION: ICD-10-CM

## 2022-03-02 DIAGNOSIS — N13.8 HYPERTROPHY OF PROSTATE WITH URINARY OBSTRUCTION: ICD-10-CM

## 2022-03-02 LAB
BILIRUBIN URINE: ABNORMAL
BLOOD URINE, POC: NEGATIVE
CHARACTER, URINE: CLEAR
COLOR, URINE: YELLOW
GLUCOSE URINE: NEGATIVE MG/DL
KETONES, URINE: NEGATIVE
LEUKOCYTE CLUMPS, URINE: ABNORMAL
NITRITE, URINE: NEGATIVE
PH, URINE: 5.5 (ref 5–9)
POST VOID RESIDUAL (PVR): 0 ML
PROTEIN, URINE: NEGATIVE MG/DL
SPECIFIC GRAVITY, URINE: 1.02 (ref 1–1.03)
UROBILINOGEN, URINE: 0.2 EU/DL (ref 0–1)

## 2022-03-02 PROCEDURE — 4004F PT TOBACCO SCREEN RCVD TLK: CPT | Performed by: UROLOGY

## 2022-03-02 PROCEDURE — 51798 US URINE CAPACITY MEASURE: CPT | Performed by: UROLOGY

## 2022-03-02 PROCEDURE — G8484 FLU IMMUNIZE NO ADMIN: HCPCS | Performed by: UROLOGY

## 2022-03-02 PROCEDURE — 3017F COLORECTAL CA SCREEN DOC REV: CPT | Performed by: UROLOGY

## 2022-03-02 PROCEDURE — 99214 OFFICE O/P EST MOD 30 MIN: CPT | Performed by: UROLOGY

## 2022-03-02 PROCEDURE — G8427 DOCREV CUR MEDS BY ELIG CLIN: HCPCS | Performed by: UROLOGY

## 2022-03-02 PROCEDURE — 81003 URINALYSIS AUTO W/O SCOPE: CPT | Performed by: UROLOGY

## 2022-03-02 PROCEDURE — G8419 CALC BMI OUT NRM PARAM NOF/U: HCPCS | Performed by: UROLOGY

## 2022-03-02 RX ORDER — OXYBUTYNIN CHLORIDE 10 MG/1
10 TABLET, EXTENDED RELEASE ORAL DAILY
Qty: 90 TABLET | Refills: 3 | Status: SHIPPED | OUTPATIENT
Start: 2022-03-02 | End: 2022-03-09 | Stop reason: ALTCHOICE

## 2022-03-02 NOTE — PROGRESS NOTES
Dr. Cynthia Velazquez MD  [facility])  Urology Clinic      Patient:  Arnaud Maynard  YOB: 1970  Date: 3/2/2022    HISTORY OF PRESENT ILLNESS:   The patient is a 45 y.o. male who presents today for evaluation of the following problem(s): Severe BPH requiring 10 minutes to go. On Flomax and this isnt helping much. We proceeded with TURP and he is doing better but still having some OAB. He then developed a [address] and this was opened and now his is doing excellent. Still taking flomax. Overall the problem(s) : are stable. Associated Symptoms: No dysuria, gross hematuria. Pain Severity: 0/10    Summary of old records:   Took flomax. Imaging/Labs reviewed during today's visit:  I have independently reviewed and verified the following films during today's visit.   None    Last several PSA's:  No results found for: PSA    Last total testosterone:  No results found for: TESTOSTERONE    Urinalysis today:  Results for POC orders placed in visit on 03/02/22   POCT Urinalysis No Micro (Auto)   Result Value Ref Range    Glucose, Ur Negative NEGATIVE mg/dl    Bilirubin Urine Large (A)     Ketones, Urine Negative NEGATIVE    Specific Gravity, Urine 1.025 1.002 - 1.030    Blood, UA POC Negative NEGATIVE    pH, Urine 5.50 5.0 - 9.0    Protein, Urine Negative NEGATIVE mg/dl    Urobilinogen, Urine 0.20 0.0 - 1.0 eu/dl    Nitrite, Urine Negative NEGATIVE    Leukocyte Clumps, Urine Moderate (A) NEGATIVE    Color, Urine Yellow YELLOW-STRAW    Character, Urine Clear [id number].CLOUD   poct post void residual   Result Value Ref Range    post void residual 0 ml         Last BUN and creatinine:  Lab Results   Component Value Date    BUN 14 10/26/2021     Lab Results   Component Value Date    CREATININE 1.1 10/26/2021       Imaging Reviewed during this Office Visit:   (results were independently reviewed by physician and radiology report verified)    PAST MEDICAL, FAMILY AND SOCIAL HISTORY:  Past Medical History:   Diagnosis Date    Cerebral artery occlusion with cerebral infarction ([facility] [address]     PT STATES TIA    Chronic back pain     DDD (degenerative disc disease), lumbar     Enlarged prostate     Fibromyalgia     Hyperlipidemia     Hypertension     Kidney stones     Lumbago      Past Surgical History:   Procedure Laterality Date    COLONOSCOPY      LITHOTRIPSY      PAIN MANAGEMENT PROCEDURE N/A 1/5/2021    LESI L5 #1 performed by Yvonne Hill MD at [facility] N/A 3/26/2021    LESI # 2 @ L4 performed by Yvonne Hill MD at [facility] 113 Bilateral 5/3/2021    bilateral L-facet MBB # 1 @ L3-4, L4-5 performed by Yvonne Hill MD at [place] [address]  2017    TURP N/A 5/26/2021    CYSTOSCOPY TRANSURETHRAL RESECTION PROSTATE performed by Scooby Trivedi MD at [address] [place] [address] TURP N/A 11/10/2021    CYSTOSCOPY TRANSURETHRAL RESECTION PROSTATE TRANSURETHERAL INCISION OF BLADDER NECK CONTRACTURE performed by Scooby Trivedi MD at [address] [place] [address] History   Problem Relation Age of Onset    Cancer Mother     Diabetes Mother     Heart Disease Neg Hx     High Blood Pressure Neg Hx     Stroke Neg Hx      Outpatient Medications Marked as Taking for the 3/2/22 encounter (Office Visit) with Scooby Trivedi MD   Medication Sig Dispense Refill    cyclobenzaprine (FLEXERIL) 5 MG tablet TAKE 1 TABLET BY MOUTH THREE TIMES A DAY AS NEEDED FOR MUSCLE SPASMS 90 tablet 0    etodolac (LODINE) 400 MG tablet TAKE 1 TABLET BY MOUTH THREE TIMES A DAY 90 tablet 1    solifenacin (VESICARE) 10 MG tablet Take 1 tablet by mouth daily 30 tablet 5    gabapentin (NEURONTIN) 300 MG capsule Take 1 capsule by mouth 3 times daily for 60 days. (Patient taking differently: Take 300 mg by mouth 3 times daily.  Night time taking 600mg) 90 capsule 1    DULoxetine (CYMBALTA) 30 MG extended release capsule 2 times daily Pt takes 60mg in am and 30mg in the pm      atorvastatin (LIPITOR) 20 MG tablet Take 20 mg by mouth daily      traZODone (DESYREL) 50 MG tablet Take 50 mg by mouth nightly as needed       tamsulosin (FLOMAX) 0.4 MG capsule Take 0.4 mg by mouth 2 times daily 2 tabs         Patient has no known allergies. Social History     Tobacco Use   Smoking Status Current Every Day Smoker    Packs/day: 0.50    Types: Cigarettes   Smokeless Tobacco Never Used       Social History     Substance and Sexual Activity   Alcohol Use Not Currently       REVIEW OF SYSTEMS:  Constitutional: negative  Eyes: negative  Respiratory: negative  Cardiovascular: negative  Gastrointestinal: negative  Musculoskeletal: negative  Genitourinary: negative except for what is in HPI  Skin: negative   Neurological: negative  Hematological/Lymphatic: negative  Psychological: negative    Physical Exam:    This a 45 y.o. male   Vitals:    03/02/22 1443   Resp: 16     Constitutional: Patient in no acute distress; Neuro: alert and oriented to person place and time. Psych: Mood and affect normal.  Skin: Normal  Lungs: Respiratory effort normal  Cardiovascular:  Normal peripheral pulses  Abdomen: Soft, non-tender, non-distended with no CVA, flank pain, hepatosplenomegaly or hernia. Kidneys normal.  Bladder non-tender and not distended. Lymphatics: no palpable lymphadenopathy      Assessment and Plan      1. Bladder neck contracture    2. Hypertrophy of prostate with urinary obstruction    3. Dysuria           Plan:      No follow-ups on file. Stop Flomax  Ditropan ER 10mg daily. See back in 6 months.           Dr. Mc Silva MD

## 2022-03-23 NOTE — TELEPHONE ENCOUNTER
Attempted to reach patient to schedule his cardiology follow up.   Please schedule with Sejal Rose.   Cardio Team   Patient notified of new surgery arrival time. Patient is to be at [address] [place] [address] Same day surgery by  10:30 am   on  11/10/21. Patient reminded to have nothing to eat or drink after midnight. Patient voiced understanding.

## 2022-04-05 ENCOUNTER — TELEPHONE (OUTPATIENT)
Dept: PHYSICAL MEDICINE AND REHAB | Age: 52
End: 2022-04-05

## 2022-04-18 RX ORDER — ETODOLAC 400 MG/1
TABLET, FILM COATED ORAL
Qty: 90 TABLET | Refills: 1 | Status: SHIPPED | OUTPATIENT
Start: 2022-04-18 | End: 2022-07-19 | Stop reason: SDUPTHER

## 2022-04-18 NOTE — TELEPHONE ENCOUNTER
OARRS reviewed. UDS:no screen   Last seen: 2/2/2022.  Follow-up:   Future Appointments   Date Time Provider Pooja Jimenezi   4/28/2022  2:30 PM Kami Lloyd AUDIOLOGY [facility]   5/5/2022 10:30 AM Kelley Schwartz MD N ENT [facility] [address]   5/17/2022 10:00 AM Lynnie Sicard, APRN - CNP N SRPX Pain [facility] [address]   9/7/2022 10:30 AM Mary Smith MD [address] [place] [address]

## 2022-04-21 RX ORDER — GABAPENTIN 300 MG/1
300 CAPSULE ORAL 3 TIMES DAILY
Qty: 90 CAPSULE | Refills: 1 | Status: SHIPPED | OUTPATIENT
Start: 2022-04-21 | End: 2022-06-13

## 2022-04-21 NOTE — TELEPHONE ENCOUNTER
OARRS reviewed. UDS: none since 10/19/20 .    Last seen: 2/22/22 virtual. Prior to that 5/20/21 Follow-up:   Future Appointments   Date Time Provider Pooja Aviva   4/28/2022  2:30 PM Kami Randolph AUDIOLOGY [address] [place] [address]   5/5/2022 10:30 AM Ash Lu MD N ENT [address]   5/17/2022 10:00 AM Shen Spring APRN - CNP N SRPX Pain [address]   9/7/2022 10:30 AM Marino Mendoza MD [address] [place] [address]

## 2022-04-22 ENCOUNTER — PREP FOR PROCEDURE (OUTPATIENT)
Dept: PHYSICAL MEDICINE AND REHAB | Age: 52
End: 2022-04-22

## 2022-04-26 ENCOUNTER — APPOINTMENT (OUTPATIENT)
Dept: GENERAL RADIOLOGY | Age: 52
End: 2022-04-26
Attending: PAIN MEDICINE
Payer: MEDICAID

## 2022-04-26 ENCOUNTER — HOSPITAL ENCOUNTER (OUTPATIENT)
Age: 52
Setting detail: OUTPATIENT SURGERY
Discharge: HOME | End: 2022-04-26
Attending: PAIN MEDICINE | Admitting: PAIN MEDICINE
Payer: MEDICAID

## 2022-04-26 ENCOUNTER — ANESTHESIA EVENT (OUTPATIENT)
Dept: OPERATING ROOM | Age: 52
End: 2022-04-26
Payer: MEDICAID

## 2022-04-26 ENCOUNTER — ANESTHESIA (OUTPATIENT)
Dept: OPERATING ROOM | Age: 52
End: 2022-04-26
Payer: MEDICAID

## 2022-04-26 VITALS
TEMPERATURE: 97 F | DIASTOLIC BLOOD PRESSURE: 65 MMHG | OXYGEN SATURATION: 96 % | HEART RATE: 64 BPM | RESPIRATION RATE: 16 BRPM | BODY MASS INDEX: 26.32 KG/M2 | HEIGHT: 71 IN | WEIGHT: 188 LBS | SYSTOLIC BLOOD PRESSURE: 112 MMHG

## 2022-04-26 VITALS
OXYGEN SATURATION: 99 % | RESPIRATION RATE: 16 BRPM | SYSTOLIC BLOOD PRESSURE: 114 MMHG | DIASTOLIC BLOOD PRESSURE: 73 MMHG

## 2022-04-26 PROCEDURE — 2709999900 HC NON-CHARGEABLE SUPPLY: Performed by: PAIN MEDICINE

## 2022-04-26 PROCEDURE — 64493 INJ PARAVERT F JNT L/S 1 LEV: CPT | Performed by: PAIN MEDICINE

## 2022-04-26 PROCEDURE — 3209999900 FLUORO FOR SURGICAL PROCEDURES

## 2022-04-26 PROCEDURE — 3600000054 HC PAIN LEVEL 3 BASE: Performed by: PAIN MEDICINE

## 2022-04-26 PROCEDURE — 7100000011 HC PHASE II RECOVERY - ADDTL 15 MIN: Performed by: PAIN MEDICINE

## 2022-04-26 PROCEDURE — 7100000010 HC PHASE II RECOVERY - FIRST 15 MIN: Performed by: PAIN MEDICINE

## 2022-04-26 PROCEDURE — 3700000000 HC ANESTHESIA ATTENDED CARE: Performed by: PAIN MEDICINE

## 2022-04-26 PROCEDURE — 64494 INJ PARAVERT F JNT L/S 2 LEV: CPT | Performed by: PAIN MEDICINE

## 2022-04-26 PROCEDURE — 2500000003 HC RX 250 WO HCPCS: Performed by: PAIN MEDICINE

## 2022-04-26 PROCEDURE — 6360000002 HC RX W HCPCS

## 2022-04-26 PROCEDURE — 6360000002 HC RX W HCPCS: Performed by: PAIN MEDICINE

## 2022-04-26 RX ORDER — PROPOFOL 10 MG/ML
INJECTION, EMULSION INTRAVENOUS PRN
Status: DISCONTINUED | OUTPATIENT
Start: 2022-04-26 | End: 2022-04-26 | Stop reason: SDUPTHER

## 2022-04-26 RX ORDER — BUPIVACAINE HYDROCHLORIDE 2.5 MG/ML
INJECTION, SOLUTION EPIDURAL; INFILTRATION; INTRACAUDAL PRN
Status: DISCONTINUED | OUTPATIENT
Start: 2022-04-26 | End: 2022-04-26 | Stop reason: ALTCHOICE

## 2022-04-26 RX ORDER — LIDOCAINE HYDROCHLORIDE 10 MG/ML
INJECTION, SOLUTION INFILTRATION; PERINEURAL PRN
Status: DISCONTINUED | OUTPATIENT
Start: 2022-04-26 | End: 2022-04-26 | Stop reason: ALTCHOICE

## 2022-04-26 RX ORDER — METHYLPREDNISOLONE ACETATE 80 MG/ML
INJECTION, SUSPENSION INTRA-ARTICULAR; INTRALESIONAL; INTRAMUSCULAR; SOFT TISSUE PRN
Status: DISCONTINUED | OUTPATIENT
Start: 2022-04-26 | End: 2022-04-26 | Stop reason: ALTCHOICE

## 2022-04-26 RX ADMIN — PROPOFOL 100 MG: 10 INJECTION, EMULSION INTRAVENOUS at 11:42

## 2022-04-26 RX ADMIN — PROPOFOL 20 MG: 10 INJECTION, EMULSION INTRAVENOUS at 11:47

## 2022-04-26 ASSESSMENT — PULMONARY FUNCTION TESTS
PIF_VALUE: 0

## 2022-04-26 ASSESSMENT — PAIN - FUNCTIONAL ASSESSMENT: PAIN_FUNCTIONAL_ASSESSMENT: 0-10

## 2022-04-26 ASSESSMENT — PAIN DESCRIPTION - DESCRIPTORS: DESCRIPTORS: ACHING

## 2022-04-26 NOTE — ANESTHESIA PRE PROCEDURE
Department of Anesthesiology  Preprocedure Note       Name:  Sayda Stovall   Age:  45 y.o.  :  1970                                          MRN:  [id number]         Date:  2022      Surgeon: Katherine Lynne):  Odessa Walls MD    Procedure: Procedure(s):  bilateral L-facet MBB # 2 @ L3-4, L4-5    Medications prior to admission:   Prior to Admission medications    Medication Sig Start Date End Date Taking? Authorizing Provider   gabapentin (NEURONTIN) 300 MG capsule Take 1 capsule by mouth 3 times daily for 60 days.  22  Chriss Coelho APRN - CNP   etodolac (LODINE) 400 MG tablet TAKE 1 TABLET BY MOUTH THREE TIMES A DAY  Patient not taking: Reported on 2022  Odessa Walls MD   cyclobenzaprine (FLEXERIL) 5 MG tablet TAKE 1 TABLET BY MOUTH THREE TIMES A DAY AS NEEDED FOR MUSCLE SPASMS 22   ANGELINA Patel - CNP   solifenacin (VESICARE) 10 MG tablet Take 1 tablet by mouth daily  Patient not taking: Reported on 2022 1/3/22 1/3/23  Munciesamia Boyce APRN - CNP   DULoxetine (CYMBALTA) 30 MG extended release capsule 2 times daily Pt takes 60mg in am and 30mg in the pm 21   Historical Provider, MD   atorvastatin (LIPITOR) 20 MG tablet Take 20 mg by mouth daily    Historical Provider, MD   traZODone (DESYREL) 50 MG tablet Take 50 mg by mouth nightly as needed  21   Historical Provider, MD   tamsulosin (FLOMAX) 0.4 MG capsule Take 0.4 mg by mouth 2 times daily 2 tabs  Patient not taking: Reported on 2022   Historical Provider, MD       Current medications:    Current Facility-Administered Medications   Medication Dose Route Frequency Provider Last Rate Last Admin    bupivacaine (PF) (MARCAINE) 0.25 % injection    PRN Odessa Walls MD   12 mL at 22 1138    methylPREDNISolone acetate (DEPO-MEDROL) injection    PRN Odessa Walls MD   80 mg at 22 1138       Allergies:  No Known Allergies    Problem List: Patient Active Problem List   Diagnosis Code    Lumbar radiculopathy M54.16    Spinal stenosis of lumbar region without neurogenic claudication M48.061    Lumbar spondylosis M47.816    Hypertrophy of prostate with urinary obstruction N40.1, N13.8    Hematuria R31.9       Past Medical History:        Diagnosis Date    Cerebral artery occlusion with cerebral infarction ([facility] [address]     PT STATES TIA    Chronic back pain     DDD (degenerative disc disease), lumbar     Enlarged prostate     Fibromyalgia     Hyperlipidemia     Hypertension     Kidney stones     Lumbago        Past Surgical History:        Procedure Laterality Date    COLONOSCOPY      LITHOTRIPSY      PAIN MANAGEMENT PROCEDURE N/A 1/5/2021    LESI L5 #1 performed by Zamzam Carlos MD at [facility] PAIN MANAGEMENT PROCEDURE N/A 3/26/2021    LESI # 2 @ L4 performed by Zamzam Carlos MD at [place] [address] Bilateral 5/3/2021    bilateral L-facet MBB # 1 @ L3-4, L4-5 performed by Zamzam Carlos MD at [place] [address]    TURP N/A 5/26/2021    CYSTOSCOPY TRANSURETHRAL RESECTION PROSTATE performed by Bernard Chavez MD at [address] [place] [address] TURP N/A 11/10/2021    CYSTOSCOPY TRANSURETHRAL RESECTION PROSTATE TRANSURETHERAL INCISION OF BLADDER NECK CONTRACTURE performed by Bernard Chavez MD at [address] [place] [address] History:    Social History     Tobacco Use    Smoking status: Current Every Day Smoker     Packs/day: 0.50     Types: Cigarettes    Smokeless tobacco: Never Used   Substance Use Topics    Alcohol use: Not Currently                                Ready to quit: Not Answered  Counseling given: Not Answered      Vital Signs (Current):   Vitals:    04/26/22 1108   BP: 115/76   Pulse: 67   Resp: 16   Temp: 97.9 °F (36.6 °C)   TempSrc: Temporal   SpO2: 94%   Weight: 188 lb (85.3 kg)   Height: 5' 11\" (1.803 m)                                              BP Readings from Last 3 Encounters:   04/26/22 115/76   04/26/22 118/81   12/09/21 (!) 150/72       NPO Status: Time of last liquid consumption: 0900                        Time of last solid consumption: 2300                        Date of last liquid consumption: 04/26/22                        Date of last solid food consumption: 04/25/22    BMI:   Wt Readings from Last 3 Encounters:   04/26/22 188 lb (85.3 kg)   03/02/22 187 lb (84.8 kg)   12/09/21 187 lb 8 oz (85 kg)     Body mass index is 26.22 kg/m². CBC:   Lab Results   Component Value Date    WBC 12.9 10/26/2021    RBC 5.42 10/26/2021    HGB 16.7 10/26/2021    HCT 50.4 10/26/2021    MCV 93.0 10/26/2021    RDW 12.6 03/10/2021     10/26/2021       CMP:   Lab Results   Component Value Date     10/26/2021    K 4.6 10/26/2021    K 5.2 05/27/2021     10/26/2021    CO2 28 10/26/2021    BUN 14 10/26/2021    CREATININE 1.1 10/26/2021    GFRAA >60 03/10/2021    LABGLOM 70 10/26/2021    GLUCOSE 93 10/26/2021    PROT 6.7 03/10/2021    CALCIUM 9.4 10/26/2021    BILITOT 1.04 03/10/2021    ALKPHOS 62 03/10/2021    AST 17 03/10/2021    ALT 13 03/10/2021       POC Tests: No results for input(s): POCGLU, POCNA, POCK, POCCL, POCBUN, POCHEMO, POCHCT in the last 72 hours.     Coags:   Lab Results   Component Value Date    INR 1.03 10/29/2020       HCG (If Applicable): No results found for: PREGTESTUR, PREGSERUM, HCG, HCGQUANT     ABGs: No results found for: PHART, PO2ART, XJP7GZF, UAW1SGD, BEART, V3MHVAXM     Type & Screen (If Applicable):  No results found for: LABABO, LABRH    Drug/Infectious Status (If Applicable):  No results found for: HIV, HEPCAB    COVID-19 Screening (If Applicable): No results found for: COVID19        Anesthesia Evaluation  Patient summary reviewed and Nursing notes reviewed no history of anesthetic complications:   Airway: Mallampati: II  TM distance: >3 FB   Neck ROM: full  Mouth opening: > = 3 FB Dental:          Pulmonary:Negative Pulmonary ROS and normal exam  breath sounds clear to auscultation                             Cardiovascular:  Exercise tolerance: good (>4 METS),   (+) hypertension:,                   Neuro/Psych:   (+) CVA:, neuromuscular disease:,             GI/Hepatic/Renal: Neg GI/Hepatic/Renal ROS            Endo/Other: Negative Endo/Other ROS             Pt had no PAT visit       Abdominal:             Vascular: negative vascular ROS. Other Findings:             Anesthesia Plan      MAC     ASA 3       Induction: intravenous. Anesthetic plan and risks discussed with patient. Plan discussed with CRNA.                   [address] Pandey, DO   4/26/2022

## 2022-04-26 NOTE — OP NOTE
Pre-Procedure Note    Patient Name: Dorene Christian   YOB: 1970  Room/Bed: [address] [place] [address] Pool/[facility]  Medical Record Number: [id number]  Date: 4/26/22      Indication:  Lower back pain  Consent: On file. Vital Signs:   Vitals:    04/26/22 1108   BP: 115/76   Pulse: 67   Resp: 16   Temp: 97.9 °F (36.6 °C)   SpO2: 94%       Pre-Sedation Documentation and Exam:   Vital signs have been reviewed (see flow sheet for vitals). Sedation/ Anesthesia Plan:   MAC    Patient is an appropriate candidate for plan of sedation: yes       Preoperative Diagnosis:   L-spondylosis     Postoperative Diagnosis:   As above     Procedure Performed:  Diagnostic/Confirmatory median branch blocks at the levels of L3-4 and L4-5 bilateral under fluoroscopic guidance # 2.     Indication for the Procedure: The patient has a history of chronic low back pain that is not responding well to the conservative treatment. The patient's pain is mostly axial in nature. Pain is interfering with the activities of daily living. Physical examination revealed facet tenderness and facet loading is positive. The procedure and risks  were discussed with the patient and an informed consent was obtained.     Procedure: Bilateral     Patient is placed in prone position and skin over  the back was prepped and draped in sterile manner. Then using fluoroscopy the junction of the transverse process of the vertebra with the superior process of the facet joint was observed and the view was optimized. The skin and deep tissues posterior were infiltrated with 20 ml of 1% lidocaine. Then a #22-gauge 3-1/2  inch spinal needle was introduced through the skin wheal under fluoroscopy guidance such that the tip of the needle lies at the junction of the transverse process at L3/L4/L5 with the superior processes of the facet joint.  Then after negative aspiration a total of 12 ml of 0.25% Marcaine and depomedrol  (total 80 mg) was injected through the needles in divided doses. EBL-0  Patient's vital signs and neurological status remained stable through  the procedure and post procedural  Period. Patient was instructed to keep track of pain for next 24 hours. every hour and bring it with next visit.  Patient tollerated the procedure well and was discharged home in stable condition.       Electronically signed by Odessa Walls MD on 4/26/22 at 11:41 AM EDT

## 2022-04-26 NOTE — H&P
[facility]  History and Physical Update    Pt Name: Amada Lau  MRN: [id number]  YOB: 1970  Date of evaluation: 4/26/2022      I have examined the patient and reviewed the H&P/Consult and there are no changes to the patient or plans.         Electronically signed by Vanesa Perez MD on 4/26/2022 at 11:03 AM
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
## 2022-04-26 NOTE — ANESTHESIA POSTPROCEDURE EVALUATION
Department of Anesthesiology  Postprocedure Note    Patient: Ibeth Ortiz  MRN: [id number]  YOB: 1970  Date of evaluation: 4/26/2022  Time:  2:27 PM     Procedure Summary     Date: 04/26/22 Room / Location: [address] [place] [address] 03 / [facility]    Anesthesia Start: 1140 Anesthesia Stop: 8012    Procedure: bilateral L-facet MBB # 2 @ L3-4, L4-5 (Bilateral Back) Diagnosis: (Spondylosis of lumbar region without myelopathy or radiculopathy)    Surgeons: Amanda Zhang MD Responsible Provider: Shoaib Jacobo DO    Anesthesia Type: MAC ASA Status: 3          Anesthesia Type: MAC    Gerard Phase I:      Gerard Phase II: Gerard Score: 9    Last vitals: Reviewed and per EMR flowsheets.        Anesthesia Post Evaluation    Patient location during evaluation: bedside  Patient participation: complete - patient participated  Level of consciousness: awake and alert  Pain score: 1  Airway patency: patent  Nausea & Vomiting: no nausea and no vomiting  Complications: no  Cardiovascular status: hemodynamically stable and blood pressure returned to baseline  Respiratory status: spontaneous ventilation, room air and acceptable  Hydration status: stable

## 2022-04-28 ENCOUNTER — HOSPITAL ENCOUNTER (OUTPATIENT)
Dept: AUDIOLOGY | Age: 52
Discharge: HOME | End: 2022-04-28
Payer: MEDICAID

## 2022-04-28 PROCEDURE — 92557 COMPREHENSIVE HEARING TEST: CPT | Performed by: AUDIOLOGIST

## 2022-04-28 PROCEDURE — 92567 TYMPANOMETRY: CPT | Performed by: AUDIOLOGIST

## 2022-04-28 RX ORDER — CYCLOBENZAPRINE HCL 5 MG
5 TABLET ORAL 3 TIMES DAILY PRN
Qty: 90 TABLET | Refills: 0 | Status: SHIPPED | OUTPATIENT
Start: 2022-04-28 | End: 2022-06-13

## 2022-04-28 NOTE — TELEPHONE ENCOUNTER
Pt called stating he isnt sure if he should be taking solifenacin and oxybutynin he is calling for a refill of solifenacin. He stated that he is still having difficulty holding his urine.     Please advise    Donelda Gottron called requesting a refill on the following medications:  Requested Prescriptions     Pending Prescriptions Disp Refills    solifenacin (VESICARE) 10 MG tablet 30 tablet 5     Sig: Take 1 tablet by mouth daily     Pharmacy verified:  .[email or web address]      Date of last visit:   Date of next visit (if applicable): 3/1/8504

## 2022-04-29 RX ORDER — SOLIFENACIN SUCCINATE 10 MG/1
10 TABLET, FILM COATED ORAL DAILY
Qty: 30 TABLET | Refills: 5 | Status: SHIPPED | OUTPATIENT
Start: 2022-04-29 | End: 2022-09-08 | Stop reason: SDUPTHER

## 2022-04-29 RX ORDER — SOLIFENACIN SUCCINATE 10 MG/1
10 TABLET, FILM COATED ORAL DAILY
Qty: 30 TABLET | Refills: 5 | Status: SHIPPED | OUTPATIENT
Start: 2022-04-29 | End: 2022-05-05 | Stop reason: ALTCHOICE

## 2022-04-29 NOTE — TELEPHONE ENCOUNTER
Neil Babinski called requesting a refill on the following medications:  Requested Prescriptions     Pending Prescriptions Disp Refills    solifenacin (VESICARE) 10 MG tablet 30 tablet 5     Sig: Take 1 tablet by mouth daily     Pharmacy verified:  [email or web address]      Date of last visit: 03/02/2022  Date of next visit (if applicable): 0/80/4501

## 2022-05-05 ENCOUNTER — OFFICE VISIT (OUTPATIENT)
Dept: ENT CLINIC | Age: 52
End: 2022-05-05
Payer: MEDICAID

## 2022-05-05 VITALS
DIASTOLIC BLOOD PRESSURE: 100 MMHG | RESPIRATION RATE: 16 BRPM | WEIGHT: 188 LBS | BODY MASS INDEX: 26.22 KG/M2 | SYSTOLIC BLOOD PRESSURE: 140 MMHG | HEART RATE: 83 BPM | OXYGEN SATURATION: 96 % | TEMPERATURE: 96.4 F

## 2022-05-05 DIAGNOSIS — M95.0 SADDLE NOSE DEFORMITY, ACQUIRED: ICD-10-CM

## 2022-05-05 DIAGNOSIS — H90.3 BILATERAL HIGH FREQUENCY SENSORINEURAL HEARING LOSS: Primary | ICD-10-CM

## 2022-05-05 DIAGNOSIS — Z87.828 HISTORY OF HEAD INJURY: ICD-10-CM

## 2022-05-05 DIAGNOSIS — R06.83 SNORING: ICD-10-CM

## 2022-05-05 DIAGNOSIS — Z98.890 HISTORY OF NASAL SEPTOPLASTY: ICD-10-CM

## 2022-05-05 DIAGNOSIS — H93.13 TINNITUS OF BOTH EARS: ICD-10-CM

## 2022-05-05 PROCEDURE — 4004F PT TOBACCO SCREEN RCVD TLK: CPT | Performed by: OTOLARYNGOLOGY

## 2022-05-05 PROCEDURE — G8419 CALC BMI OUT NRM PARAM NOF/U: HCPCS | Performed by: OTOLARYNGOLOGY

## 2022-05-05 PROCEDURE — 3017F COLORECTAL CA SCREEN DOC REV: CPT | Performed by: OTOLARYNGOLOGY

## 2022-05-05 PROCEDURE — G8427 DOCREV CUR MEDS BY ELIG CLIN: HCPCS | Performed by: OTOLARYNGOLOGY

## 2022-05-05 PROCEDURE — 99204 OFFICE O/P NEW MOD 45 MIN: CPT | Performed by: OTOLARYNGOLOGY

## 2022-05-05 RX ORDER — DULOXETIN HYDROCHLORIDE 60 MG/1
CAPSULE, DELAYED RELEASE ORAL
COMMUNITY
Start: 2022-05-02

## 2022-05-05 ASSESSMENT — ENCOUNTER SYMPTOMS
SINUS PRESSURE: 1
FACIAL SWELLING: 0
APNEA: 0
SORE THROAT: 0
ABDOMINAL PAIN: 0
VOICE CHANGE: 0
NAUSEA: 0
COLOR CHANGE: 0
RHINORRHEA: 0
COUGH: 0
TROUBLE SWALLOWING: 0
CHEST TIGHTNESS: 0
VOMITING: 0
STRIDOR: 0
WHEEZING: 0
DIARRHEA: 0
CHOKING: 0
SHORTNESS OF BREATH: 0
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
## 2022-05-13 RX ORDER — ETODOLAC 400 MG/1
TABLET, FILM COATED ORAL
Qty: 90 TABLET | Refills: 1 | OUTPATIENT
Start: 2022-05-13 | End: 2022-06-12

## 2022-05-17 ENCOUNTER — TELEPHONE (OUTPATIENT)
Dept: PHYSICAL MEDICINE AND REHAB | Age: 52
End: 2022-05-17

## 2022-05-17 ENCOUNTER — OFFICE VISIT (OUTPATIENT)
Dept: PHYSICAL MEDICINE AND REHAB | Age: 52
End: 2022-05-17
Payer: MEDICAID

## 2022-05-17 VITALS
BODY MASS INDEX: 26.32 KG/M2 | DIASTOLIC BLOOD PRESSURE: 80 MMHG | WEIGHT: 188 LBS | SYSTOLIC BLOOD PRESSURE: 128 MMHG | HEIGHT: 71 IN

## 2022-05-17 DIAGNOSIS — M47.816 SPONDYLOSIS OF LUMBAR REGION WITHOUT MYELOPATHY OR RADICULOPATHY: Primary | ICD-10-CM

## 2022-05-17 DIAGNOSIS — M54.50 LUMBAR PAIN: ICD-10-CM

## 2022-05-17 DIAGNOSIS — M79.605 PAIN IN BOTH LOWER EXTREMITIES: ICD-10-CM

## 2022-05-17 DIAGNOSIS — M79.604 PAIN IN BOTH LOWER EXTREMITIES: ICD-10-CM

## 2022-05-17 DIAGNOSIS — M54.17 LUMBOSACRAL RADICULITIS: ICD-10-CM

## 2022-05-17 DIAGNOSIS — M25.561 CHRONIC PAIN OF BOTH KNEES: ICD-10-CM

## 2022-05-17 DIAGNOSIS — M47.819 FACET ARTHROPATHY OF SPINE: ICD-10-CM

## 2022-05-17 DIAGNOSIS — G89.29 CHRONIC PAIN OF BOTH KNEES: ICD-10-CM

## 2022-05-17 DIAGNOSIS — M25.562 CHRONIC PAIN OF BOTH KNEES: ICD-10-CM

## 2022-05-17 DIAGNOSIS — M48.062 LUMBAR STENOSIS WITH NEUROGENIC CLAUDICATION: ICD-10-CM
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
## 2022-05-17 DIAGNOSIS — M51.36 ANNULAR TEAR OF LUMBAR DISC: ICD-10-CM

## 2022-05-17 PROCEDURE — 99214 OFFICE O/P EST MOD 30 MIN: CPT | Performed by: NURSE PRACTITIONER

## 2022-05-17 PROCEDURE — G8419 CALC BMI OUT NRM PARAM NOF/U: HCPCS | Performed by: NURSE PRACTITIONER

## 2022-05-17 PROCEDURE — G8427 DOCREV CUR MEDS BY ELIG CLIN: HCPCS | Performed by: NURSE PRACTITIONER

## 2022-05-17 PROCEDURE — 4004F PT TOBACCO SCREEN RCVD TLK: CPT | Performed by: NURSE PRACTITIONER

## 2022-05-17 PROCEDURE — 3017F COLORECTAL CA SCREEN DOC REV: CPT | Performed by: NURSE PRACTITIONER

## 2022-05-17 ASSESSMENT — ENCOUNTER SYMPTOMS: BACK PAIN: 1

## 2022-05-17 NOTE — PROGRESS NOTES
[address] [facility] [address]  Dept: [phone]  Dept Fax: [phone]: [phone]    Visit Date: 5/17/2022    Functionality Assessment/Goals Worksheet     On a scale of 0 (Does not Interfere) to 10 (Completely Interferes)     1. Which number describes how during the past week pain has interfered with       the following:  A. General Activity:  7  B. Mood: 6  C. Walking Ability:  9  D. Normal Work (Includes both work outside the home and housework):  7  E. Relations with Other People:   7  F. Sleep:   8  G. Enjoyment of Life:   6    2. Patient Prefers to Take their Pain Medications:     []  On a regular basis   [x]  Only when necessary    []  Does not take pain medications    3. What are the Patient's Goals/Expectations for Visiting Pain Management? []  Learn about my pain    []  Receive Medication   []  Physical Therapy     []  Treat Depression   []  Receive Injections    []  Treat Sleep   []  Deal with Anxiety and Stress   []  Treat Opoid Dependence/Addiction   []  Other:      HPI:   Star Linares is a 45 y.o. male is here today for    Chief Complaint: Low back pain, leg pain     HPI   FU from bilateral L-facet MBB # 2 from 4/26/2022. Received about 80% relief of pain in low back for 4 full days and at this time continues about 60% relief. Wife states Gray Parker is not ad grouchy\"Able to tolerate more activity with less pain and job at [employer] easier. Continues to have pain in low back- dull ache. Continues to have pain down posterior legs intermittent with activity- tight, numbness and tingling. Had a fall last week while walking his dog and fall on bilateral knees and having a lot of pain in his knees- effects walking and climbing stairs. Shooting and stabbing. Continues NSIAD, neurontin.  Continues THC   Pain increases with bending, lifting, twisting , walking, standing, stairs, getting up and down and housework or working at job. Medications reviewed. Patient denies side effects with medications. Patient states he is taking medications as prescribed. Hedenies receiving pain medications from other sources. He denies any ER visits since last visit. Pain scale with out pain medications or at its worst is 6-8/10 knees        The patienthas No Known Allergies. Subjective:      Review of Systems   Constitutional: Negative. Genitourinary: Negative for difficulty urinating. Musculoskeletal: Positive for arthralgias, back pain, gait problem, joint swelling and myalgias. No assist devices    Neurological: Positive for weakness and numbness. Psychiatric/Behavioral: Positive for sleep disturbance. The patient is not nervous/anxious. Objective:     Vitals:    05/17/22 0959   BP: 128/80   Weight: 188 lb (85.3 kg)   Height: 5' 11\" (1.803 m)       Physical Exam  Vitals reviewed. Constitutional:       General: He is not in acute distress. Appearance: He is well-developed. He is not diaphoretic. HENT:      Head: Normocephalic and atraumatic. Not macrocephalic and not microcephalic. Right Ear: External ear normal.      Left Ear: External ear normal.      Mouth/Throat:      Mouth: Mucous membranes are moist.   Eyes:      General:         Right eye: No discharge. Left eye: No discharge. Conjunctiva/sclera: Conjunctivae normal.   Neck:      Trachea: No tracheal deviation. Cardiovascular:      Rate and Rhythm: Normal rate and regular rhythm. Pulses: Normal pulses. Heart sounds: Normal heart sounds. Pulmonary:      Effort: Pulmonary effort is normal. No respiratory distress. Abdominal:      General: Abdomen is flat. Palpations: Abdomen is soft. Musculoskeletal:         General: Tenderness present. Cervical back: Muscular tenderness present. Lumbar back: Spasms and tenderness present.  Decreased range of motion. Back:       Right upper leg: Tenderness and bony tenderness present. Left upper leg: Tenderness and bony tenderness present. Right lower leg: Tenderness and bony tenderness present. Left lower leg: Tenderness and bony tenderness present. Legs:    Skin:     General: Skin is warm and dry. Coloration: Skin is not pale. Findings: No rash. Neurological:      Mental Status: He is alert and oriented to person, place, and time. Cranial Nerves: No cranial nerve deficit. Sensory: Sensory deficit present. Motor: Weakness present. Comments: 4/5 bilateral lower extremity   + Bilateral leg SLR at 60 degrees    Psychiatric:         Attention and Perception: He is attentive. Speech: Speech normal.         Behavior: Behavior normal.         Thought Content: Thought content normal.         Judgment: Judgment normal.       GUMARO test: +   Yeomans test: +   Gaenslen test: +        Assessment:     1. Spondylosis of lumbar region without myelopathy or radiculopathy    2. Lumbosacral radiculitis    3. Lumbar stenosis with neurogenic claudication    4. Annular tear of lumbar disc    5. Pain in both lower extremities    6. Facet arthropathy of spine    7. Lumbar pain    8. Chronic pain of both knees    9. Fall, subsequent encounter            Plan:      OARRS reviewed. Current MED: 0  Patient was not offered naloxone for home. Discussed long term side effects of medications, tolerance, dependency and addiction. Previous UDS reviewed  UDS preformed today for compliance. Patient told can not receive any pain medications from any other source. No evidence of abuse, diversion or aberrant behavior.   Medications and/or procedures to improve function and quality of life- patient understanding with this and that may not be pain free  Discussed with patient about safe storage of medications at home  Discussed possible weaning of medication dosing dependent on treatment/procedure results. Discussed with patient about risks with procedure including infection, reaction to medication, increased pain, or bleeding. Procedure notes reveiwed in detail  Received 80% relief of low back pain from L-facet MBB # 2. Plan Bilateral L-facet RFA at L3-4 and L4-5 for longer term pain relief. Procedure and risks discussed in detail. Continue Neurontin 300 mg in am and afternoon and 600 at bedtime- has a refill  Resume flexeril 5 mg TID prn - has plenty   Continue Etodalac prn   Ordered bilateral knee xray d/t pain from falling   Updated Lumbar MRI d/t radicular pain. Very short term relief from LESI X 2 in past.      Meds. Prescribed:   No orders of the defined types were placed in this encounter. Return for Bilateral L-facet RFA at L3-4 and L4-5. , follow up after procedure.                Electronically signed by ANGELINA Diamond CNP on5/17/2022 at 10:29 AM

## 2022-05-18 RX ORDER — CYCLOBENZAPRINE HCL 5 MG
TABLET ORAL
Qty: 90 TABLET | Refills: 0 | OUTPATIENT
Start: 2022-05-18

## 2022-05-19 ENCOUNTER — HOSPITAL ENCOUNTER (OUTPATIENT)
Dept: AUDIOLOGY | Age: 52
Discharge: HOME | End: 2022-05-19

## 2022-05-19 PROCEDURE — 9990000010 HC NO CHARGE VISIT: Performed by: AUDIOLOGIST

## 2022-05-19 NOTE — PROGRESS NOTES
DIAGNOSIS: HF SNHL, Tinnitus, selma. HEARING AID EVALUATION: Discussed hearing aid options with the patient. Unfortunately, his loss does not meet current medicaid guidelines for hearing aids. Tinnitus is primary complaint, most noticeable in the right ear, and fluctuates in loudness throughout the day. 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 His history is significant for solitary confinement and he is adjusting in his transition since his release. He also reports multiple head injuries in the past. He was in [place] and lived in [address] but reportedly does not qualify for [place] benefits. 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 He runs a fan at night which helps him to not focus as much on the tinnitus. He also has found that chewing certain foods (peanut m&ms) helps. He plays music in the car during the day. Reviewed his audiogram and mechanisms of tinnitus and hyperacusis. Discussed different tinnitus management strategies. Briefly discussed filtered earplugs for use during the day if environmental sounds are negatively impacting his ability to communicate with customers, etc. but patient reported tinnitus is more noticeable with any type of earbuds in so we did not feel that would be beneficial. Gave contact information for [facility] tinnitus clinic and [facility] org resources. Patient already attends counseling services. Advised him to ask counselor for recommendations/treatment options for tinnitus and sound sensitivity. Scheduled repeat audiogram in 1 year but advised patient to call sooner if any significant changes occur. Recommend hearing protection in loud noise, limit excessive noise exposure. Patient expressed understanding.

## 2022-06-02 ENCOUNTER — TELEPHONE (OUTPATIENT)
Dept: ENT CLINIC | Age: 52
End: 2022-06-02

## 2022-06-02 ENCOUNTER — PREP FOR PROCEDURE (OUTPATIENT)
Dept: PHYSICAL MEDICINE AND REHAB | Age: 52
End: 2022-06-02

## 2022-06-02 NOTE — TELEPHONE ENCOUNTER
Faxed a request to Medical Records Department 6/2/2022 requesting an office visit note from 5/5/2022 with Dr. Frankie Mckenzie, needs to be sent to [address] [place] [address] per request.  Radha Martinez is included in the company's request. Needs to be sent before 6/22/2022.

## 2022-06-09 ENCOUNTER — TELEPHONE (OUTPATIENT)
Dept: PHYSICAL MEDICINE AND REHAB | Age: 52
End: 2022-06-09

## 2022-06-09 NOTE — TELEPHONE ENCOUNTER
Pt. Did not come to today's schedule procedure. Pt. Contacted. States that he was not aware. Procedure and follow up rescheduled. Educated on pre-procedure instructions. Verbalized understanding.

## 2022-06-13 RX ORDER — CYCLOBENZAPRINE HCL 5 MG
5 TABLET ORAL 3 TIMES DAILY PRN
Qty: 90 TABLET | Refills: 0 | Status: SHIPPED | OUTPATIENT
Start: 2022-06-13 | End: 2022-07-19

## 2022-06-13 RX ORDER — GABAPENTIN 300 MG/1
300 CAPSULE ORAL 3 TIMES DAILY
Qty: 90 CAPSULE | Refills: 1 | Status: SHIPPED | OUTPATIENT
Start: 2022-06-18 | End: 2022-08-25

## 2022-06-28 ENCOUNTER — PREP FOR PROCEDURE (OUTPATIENT)
Dept: PHYSICAL MEDICINE AND REHAB | Age: 52
End: 2022-06-28

## 2022-07-05 ENCOUNTER — HOSPITAL ENCOUNTER (OUTPATIENT)
Age: 52
Setting detail: OUTPATIENT SURGERY
Discharge: HOME | End: 2022-07-05
Attending: PAIN MEDICINE | Admitting: PAIN MEDICINE
Payer: MEDICAID

## 2022-07-05 ENCOUNTER — APPOINTMENT (OUTPATIENT)
Dept: GENERAL RADIOLOGY | Age: 52
End: 2022-07-05
Attending: PAIN MEDICINE
Payer: MEDICAID

## 2022-07-05 ENCOUNTER — ANESTHESIA (OUTPATIENT)
Dept: OPERATING ROOM | Age: 52
End: 2022-07-05
Payer: MEDICAID

## 2022-07-05 ENCOUNTER — ANESTHESIA EVENT (OUTPATIENT)
Dept: OPERATING ROOM | Age: 52
End: 2022-07-05
Payer: MEDICAID

## 2022-07-05 VITALS
OXYGEN SATURATION: 95 % | SYSTOLIC BLOOD PRESSURE: 112 MMHG | DIASTOLIC BLOOD PRESSURE: 82 MMHG | TEMPERATURE: 97.3 F | BODY MASS INDEX: 25.73 KG/M2 | WEIGHT: 183.8 LBS | HEART RATE: 66 BPM | HEIGHT: 71 IN | RESPIRATION RATE: 14 BRPM

## 2022-07-05 PROCEDURE — 2500000003 HC RX 250 WO HCPCS: Performed by: PAIN MEDICINE

## 2022-07-05 PROCEDURE — 2500000003 HC RX 250 WO HCPCS: Performed by: ANESTHESIOLOGY

## 2022-07-05 PROCEDURE — 6360000002 HC RX W HCPCS: Performed by: ANESTHESIOLOGY

## 2022-07-05 PROCEDURE — 3600000057 HC PAIN LEVEL 4 ADDL 15 MIN: Performed by: PAIN MEDICINE

## 2022-07-05 PROCEDURE — 3700000000 HC ANESTHESIA ATTENDED CARE: Performed by: PAIN MEDICINE

## 2022-07-05 PROCEDURE — 64635 DESTROY LUMB/SAC FACET JNT: CPT | Performed by: PAIN MEDICINE

## 2022-07-05 PROCEDURE — 7100000011 HC PHASE II RECOVERY - ADDTL 15 MIN: Performed by: PAIN MEDICINE

## 2022-07-05 PROCEDURE — 2709999900 HC NON-CHARGEABLE SUPPLY: Performed by: PAIN MEDICINE

## 2022-07-05 PROCEDURE — 64636 DESTROY L/S FACET JNT ADDL: CPT | Performed by: PAIN MEDICINE

## 2022-07-05 PROCEDURE — 3700000001 HC ADD 15 MINUTES (ANESTHESIA): Performed by: PAIN MEDICINE

## 2022-07-05 PROCEDURE — 3600000056 HC PAIN LEVEL 4 BASE: Performed by: PAIN MEDICINE

## 2022-07-05 PROCEDURE — 7100000010 HC PHASE II RECOVERY - FIRST 15 MIN: Performed by: PAIN MEDICINE

## 2022-07-05 PROCEDURE — 3209999900 FLUORO FOR SURGICAL PROCEDURES

## 2022-07-05 RX ORDER — LIDOCAINE HYDROCHLORIDE 20 MG/ML
INJECTION, SOLUTION EPIDURAL; INFILTRATION; INTRACAUDAL; PERINEURAL PRN
Status: DISCONTINUED | OUTPATIENT
Start: 2022-07-05 | End: 2022-07-05 | Stop reason: SDUPTHER

## 2022-07-05 RX ORDER — BUPIVACAINE HYDROCHLORIDE 2.5 MG/ML
INJECTION, SOLUTION EPIDURAL; INFILTRATION; INTRACAUDAL PRN
Status: DISCONTINUED | OUTPATIENT
Start: 2022-07-05 | End: 2022-07-05 | Stop reason: ALTCHOICE

## 2022-07-05 RX ORDER — PROPOFOL 10 MG/ML
INJECTION, EMULSION INTRAVENOUS PRN
Status: DISCONTINUED | OUTPATIENT
Start: 2022-07-05 | End: 2022-07-05 | Stop reason: SDUPTHER

## 2022-07-05 RX ORDER — FENTANYL CITRATE 50 UG/ML
INJECTION, SOLUTION INTRAMUSCULAR; INTRAVENOUS PRN
Status: DISCONTINUED | OUTPATIENT
Start: 2022-07-05 | End: 2022-07-05 | Stop reason: SDUPTHER

## 2022-07-05 RX ORDER — LIDOCAINE HYDROCHLORIDE 10 MG/ML
INJECTION, SOLUTION EPIDURAL; INFILTRATION; INTRACAUDAL; PERINEURAL PRN
Status: DISCONTINUED | OUTPATIENT
Start: 2022-07-05 | End: 2022-07-05 | Stop reason: ALTCHOICE

## 2022-07-05 RX ADMIN — FENTANYL CITRATE 100 MCG: 50 INJECTION, SOLUTION INTRAMUSCULAR; INTRAVENOUS at 07:53

## 2022-07-05 RX ADMIN — LIDOCAINE HYDROCHLORIDE 80 MG: 20 INJECTION, SOLUTION EPIDURAL; INFILTRATION; INTRACAUDAL; PERINEURAL at 08:08

## 2022-07-05 RX ADMIN — PROPOFOL 60 MG: 10 INJECTION, EMULSION INTRAVENOUS at 08:08

## 2022-07-05 ASSESSMENT — PAIN - FUNCTIONAL ASSESSMENT: PAIN_FUNCTIONAL_ASSESSMENT: 0-10

## 2022-07-05 NOTE — ANESTHESIA POSTPROCEDURE EVALUATION
Department of Anesthesiology  Postprocedure Note    Patient: Jie Bright  MRN: [id number]  YOB: 1970  Date of evaluation: 7/5/2022      Procedure Summary     Date: 07/05/22 Room / Location: [facility] OR 03 / [facility]    Anesthesia Start: 2656 Anesthesia Stop: 0813    Procedure: Bilateral Lumbar Facet Radiofrequency Ablation Lumbar 3-4, 4-5. (Bilateral ) Diagnosis:       Spinal stenosis of lumbar region without neurogenic claudication      (Spondylosis of lumbar region without myelopathy or radiculopathy)    Surgeons: Nathanael Fallon MD Responsible Provider: Wilbur Barone MD    Anesthesia Type: MAC ASA Status: 3          Anesthesia Type: No value filed. Gerard Phase I: Gerard Score: 10    Gerard Phase II:        Anesthesia Post Evaluation    Patient location during evaluation: bedside  Patient participation: complete - patient cannot participate  Level of consciousness: awake and alert  Airway patency: patent  Nausea & Vomiting: no nausea and no vomiting  Complications: no  Cardiovascular status: hemodynamically stable  Respiratory status: acceptable  Hydration status: euvolemic      [facility]  POST-ANESTHESIA NOTE       Name:  Jie Bright                                         Age:  47 y.o.   MRN:  [id number]      Last Vitals:  /86   Pulse 67   Temp 97.2 °F (36.2 °C) (Temporal)   Resp 16   Ht 5' 11\" (1.803 m)   Wt 183 lb 12.8 oz (83.4 kg)   SpO2 96%   BMI 25.63 kg/m²   Patient Vitals for the past 4 hrs:   BP Temp Temp src Pulse Resp SpO2 Height Weight   07/05/22 0704 131/86 97.2 °F (36.2 °C) Temporal 67 16 96 % 5' 11\" (1.803 m) 183 lb 12.8 oz (83.4 kg)       Level of Consciousness:  Awake    Respiratory:  Stable    Oxygen Saturation:  Stable    Cardiovascular:  Stable    Hydration:  Adequate    PONV:  Stable    Post-op Pain:  Adequate analgesia    Post-op Assessment:  No apparent anesthetic complications    Additional Follow-Up / Treatment / Comment:  Carmelita Lobo MD  July 5, 2022   8:14 AM

## 2022-07-05 NOTE — H&P
H&P    Bilateral L-facet MBB # 2 from 4/26/2022. Received about 80% relief of pain in low back for 4 full days and at this time continues about 60% relief. Wife states Chirag Bennett is not ad grouchy\"Able to tolerate more activity with less pain and job at [employer] easier. Continues to have pain in low back- dull ache. Continues to have pain down posterior legs intermittent with activity- tight, numbness and tingling.      Had a fall last week while walking his dog and fall on bilateral knees and having a lot of pain in his knees- effects walking and climbing stairs. Shooting and stabbing.      Continues NSIAD, neurontin. Continues THC   Pain increases with bending, lifting, twisting , walking, standing, stairs, getting up and down and housework or working at job.        Medications reviewed. Patient denies side effects with medications. Patient states he is taking medications as prescribed. Hedenies receiving pain medications from other sources. He denies any ER visits since last visit.     Pain scale with out pain medications or at its worst is 6-8/10 knees           The patienthas No Known Allergies.        Subjective:      Review of Systems   Constitutional: Negative. Genitourinary: Negative for difficulty urinating. Musculoskeletal: Positive for arthralgias, back pain, gait problem, joint swelling and myalgias. No assist devices    Neurological: Positive for weakness and numbness. Psychiatric/Behavioral: Positive for sleep disturbance. The patient is not nervous/anxious.          Objective:      Vitals       Vitals:     05/17/22 0959   BP: 128/80   Weight: 188 lb (85.3 kg)   Height: 5' 11\" (1.803 m)            Physical Exam  Vitals reviewed. Constitutional:       General: He is not in acute distress. Appearance: He is well-developed. He is not diaphoretic. HENT:      Head: Normocephalic and atraumatic. Not macrocephalic and not microcephalic.       Right Ear: External ear normal.      Left Ear: External ear normal.      Mouth/Throat:      Mouth: Mucous membranes are moist.   Eyes:      General:         Right eye: No discharge. Left eye: No discharge. Conjunctiva/sclera: Conjunctivae normal.   Neck:      Trachea: No tracheal deviation. Cardiovascular:      Rate and Rhythm: Normal rate and regular rhythm. Pulses: Normal pulses. Heart sounds: Normal heart sounds. Pulmonary:      Effort: Pulmonary effort is normal. No respiratory distress. Abdominal:      General: Abdomen is flat. Palpations: Abdomen is soft. Musculoskeletal:         General: Tenderness present. Cervical back: Muscular tenderness present. Lumbar back: Spasms and tenderness present. Decreased range of motion. Back:       Right upper leg: Tenderness and bony tenderness present. Left upper leg: Tenderness and bony tenderness present. Right lower leg: Tenderness and bony tenderness present. Left lower leg: Tenderness and bony tenderness present. Legs:    Skin:     General: Skin is warm and dry. Coloration: Skin is not pale. Findings: No rash. Neurological:      Mental Status: He is alert and oriented to person, place, and time. Cranial Nerves: No cranial nerve deficit. Sensory: Sensory deficit present. Motor: Weakness present. Comments: 4/5 bilateral lower extremity   + Bilateral leg SLR at 60 degrees    Psychiatric:         Attention and Perception: He is attentive. Speech: Speech normal.         Behavior: Behavior normal.         Thought Content: Thought content normal.         Judgment: Judgment normal.         GUMARO test: +   Yeomans test: +   Gaenslen test: +      Assessment:      1. Spondylosis of lumbar region without myelopathy or radiculopathy    2. Lumbosacral radiculitis    3. Lumbar stenosis with neurogenic claudication    4. Annular tear of lumbar disc    5. Pain in both lower extremities    6.  Facet arthropathy of spine 7. Lumbar pain    8. Chronic pain of both knees    9. Fall, subsequent encounter       Plan:      · OARRS reviewed. Current MED: 0  · Patient was not offered naloxone for home. · Discussed long term side effects of medications, tolerance, dependency and addiction. · Previous UDS reviewed  · UDS preformed today for compliance. · Patient told can not receive any pain medications from any other source. · No evidence of abuse, diversion or aberrant behavior. · Medications and/or procedures to improve function and quality of life- patient understanding with this and that may not be pain free  · Discussed with patient about safe storage of medications at home  · Discussed possible weaning of medication dosing dependent on treatment/procedure results. · Discussed with patient about risks with procedure including infection, reaction to medication, increased pain, or bleeding. · Procedure notes reveiwed in detail  · Received 80% relief of low back pain from L-facet MBB # 2.   · Plan Bilateral L-facet RFA at L3-4 and L4-5 for longer term pain relief. Procedure and risks discussed in detail. · Continue Neurontin 300 mg in am and afternoon and 600 at bedtime- has a refill  · Resume flexeril 5 mg TID prn - has plenty   · Continue Etodalac prn   · Ordered bilateral knee xray d/t pain from falling   · Updated Lumbar MRI d/t radicular pain.  Very short term relief from LESI X 2 in past.          Return for Bilateral L-facet RFA at L3-4 and L4-5. , follow up after procedure.

## 2022-07-05 NOTE — ANESTHESIA PRE PROCEDURE
Department of Anesthesiology  Preprocedure Note       Name:  Colleen George   Age:  47 y.o.  :  1970                                          MRN:  [id number]         Date:  2022      Surgeon: Cricket Ulloa):  Heather Rooney MD    Procedure: Procedure(s):  Bilateral Lumbar Facet Radiofrequency Ablation Lumbar 3-4, 4-5. Medications prior to admission:   Prior to Admission medications    Medication Sig Start Date End Date Taking? Authorizing Provider   gabapentin (NEURONTIN) 300 MG capsule Take 1 capsule by mouth 3 times daily for 30 days. 22  ANGELINA Wong CNP   cyclobenzaprine (FLEXERIL) 5 MG tablet Take 1 tablet by mouth 3 times daily as needed for Muscle spasms 22  ANGELINA Wong CNP   DULoxetine (CYMBALTA) 60 MG extended release capsule TAKE 1 CAPSULE BY MOUTH EVERY DAY 22   Historical Provider, MD   solifenacin (VESICARE) 10 MG tablet Take 1 tablet by mouth daily 22  ANGELINA Gomez CNP   etodolac (LODINE) 400 MG tablet TAKE 1 TABLET BY MOUTH THREE TIMES A DAY 22  Heather Rooney MD   DULoxetine (CYMBALTA) 30 MG extended release capsule 2 times daily Pt takes 60mg in am and 30mg in the pm 21   Historical Provider, MD   atorvastatin (LIPITOR) 20 MG tablet Take 20 mg by mouth daily    Historical Provider, MD   traZODone (DESYREL) 50 MG tablet Take 50 mg by mouth nightly as needed  21   Historical Provider, MD       Current medications:    No current outpatient medications on file. No current facility-administered medications for this visit.        Allergies:  No Known Allergies    Problem List:    Patient Active Problem List   Diagnosis Code    Lumbar radiculopathy M54.16    Spinal stenosis of lumbar region without neurogenic claudication M48.061    Lumbar spondylosis M47.816    Hypertrophy of prostate with urinary obstruction N40.1, N13.8    Hematuria R31.9       Past Medical History: Diagnosis Date    Cerebral artery occlusion with cerebral infarction ([facility] [address]     PT STATES TIA    Chronic back pain     DDD (degenerative disc disease), lumbar     Enlarged prostate     Fibromyalgia     Hyperlipidemia     Hypertension     Kidney stones     Lumbago        Past Surgical History:        Procedure Laterality Date    COLONOSCOPY      LITHOTRIPSY      PAIN MANAGEMENT PROCEDURE N/A 1/5/2021    LESI L5 #1 performed by Anna Brannon MD at [facility] N/A 3/26/2021    LESI # 2 @ L4 performed by Anna Brannon MD at [facility] Bilateral 5/3/2021    bilateral L-facet MBB # 1 @ L3-4, L4-5 performed by Anna Brannon MD at [facility] Bilateral 4/26/2022    bilateral L-facet MBB # 2 @ L3-4, L4-5 performed by Anna Brannon MD at [place] [address]    TURP N/A 5/26/2021    CYSTOSCOPY TRANSURETHRAL RESECTION PROSTATE performed by Devang Colon MD at [address] [place] [address] TURP N/A 11/10/2021    CYSTOSCOPY TRANSURETHRAL RESECTION PROSTATE TRANSURETHERAL INCISION OF BLADDER NECK CONTRACTURE performed by Devang Colon MD at [facility] History:    Social History     Tobacco Use    Smoking status: Current Every Day Smoker     Packs/day: 0.50     Types: Cigarettes    Smokeless tobacco: Never Used   Substance Use Topics    Alcohol use: Not Currently                                Ready to quit: Not Answered  Counseling given: Not Answered      Vital Signs (Current): There were no vitals filed for this visit.                                            BP Readings from Last 3 Encounters:   07/05/22 131/86   05/17/22 128/80   05/05/22 (!) 140/100       NPO Status:                                                                                 BMI:   Wt Readings from Last 3 Encounters:   07/05/22 183 lb 12.8 oz (83.4 kg)   05/17/22 188 lb (85.3 kg)   05/05/22 188 lb (85.3 kg)     There is no height or weight on file to calculate BMI.    CBC:   Lab Results   Component Value Date/Time    WBC 12.9 10/26/2021 11:30 AM    RBC 5.42 10/26/2021 11:30 AM    HGB 16.7 10/26/2021 11:30 AM    HCT 50.4 10/26/2021 11:30 AM    MCV 93.0 10/26/2021 11:30 AM    RDW 12.6 03/10/2021 11:02 AM     10/26/2021 11:30 AM       CMP:   Lab Results   Component Value Date/Time     10/26/2021 11:30 AM    K 4.6 10/26/2021 11:30 AM    K 5.2 05/27/2021 05:59 AM     10/26/2021 11:30 AM    CO2 28 10/26/2021 11:30 AM    BUN 14 10/26/2021 11:30 AM    CREATININE 1.1 10/26/2021 11:30 AM    GFRAA >60 03/10/2021 11:02 AM    LABGLOM 70 10/26/2021 11:30 AM    GLUCOSE 93 10/26/2021 11:30 AM    PROT 6.7 03/10/2021 11:02 AM    CALCIUM 9.4 10/26/2021 11:30 AM    BILITOT 1.04 03/10/2021 11:02 AM    ALKPHOS 62 03/10/2021 11:02 AM    AST 17 03/10/2021 11:02 AM    ALT 13 03/10/2021 11:02 AM       POC Tests: No results for input(s): POCGLU, POCNA, POCK, POCCL, POCBUN, POCHEMO, POCHCT in the last 72 hours.     Coags:   Lab Results   Component Value Date/Time    INR 1.03 10/29/2020 07:05 AM       HCG (If Applicable): No results found for: PREGTESTUR, PREGSERUM, HCG, HCGQUANT     ABGs: No results found for: PHART, PO2ART, SNO7QXN, KKT5GSR, BEART, X0OBZEUM     Type & Screen (If Applicable):  No results found for: LABABO, LABRH    Drug/Infectious Status (If Applicable):  No results found for: HIV, HEPCAB    COVID-19 Screening (If Applicable): No results found for: COVID19        Anesthesia Evaluation  Patient summary reviewed and Nursing notes reviewed no history of anesthetic complications:   Airway: Mallampati: II  TM distance: >3 FB   Neck ROM: full  Mouth opening: > = 3 FB   Dental:          Pulmonary:Negative Pulmonary ROS and normal exam  breath sounds clear to auscultation                             Cardiovascular:  Exercise tolerance: good (>4 METS),   (+) hypertension:, Neuro/Psych:   (+) CVA:, neuromuscular disease:,             GI/Hepatic/Renal: Neg GI/Hepatic/Renal ROS            Endo/Other: Negative Endo/Other ROS             Pt had no PAT visit       Abdominal:             Vascular: negative vascular ROS. Other Findings:             Anesthesia Plan      MAC     ASA 3       Induction: intravenous. MIPS: prophylactic pharmacologic antiemetic agents not administered perioperatively for documented reasons. Anesthetic plan and risks discussed with patient.       Plan discussed with surgical team.                    Shilo Mccracken MD   7/5/2022

## 2022-07-05 NOTE — OP NOTE
Pre-Procedure Note    Patient Name: Jany Rizvi   YOB: 1970  Medical Record Number: [id number]  Date: 7/5/22    Indication: Lower back pain  Consent: On file. Vital Signs:   Vitals:    07/05/22 0704   BP: 131/86   Pulse: 67   Resp: 16   Temp: 97.2 °F (36.2 °C)   SpO2: 96%       Past Medical History:   has a past medical history of Cerebral artery occlusion with cerebral infarction ([address] Chronic back pain, DDD (degenerative disc disease), lumbar, Enlarged prostate, Fibromyalgia, Hyperlipidemia, Hypertension, Kidney stones, and Lumbago. Past Surgical History:   has a past surgical history that includes rhinoplasty (2017); Colonoscopy; Lithotripsy; Pain management procedure (N/A, 1/5/2021); Pain management procedure (N/A, 3/26/2021); Pain management procedure (Bilateral, 5/3/2021); TURP (N/A, 5/26/2021); TURP (N/A, 11/10/2021); and Pain management procedure (Bilateral, 4/26/2022). Pre-Sedation Documentation and Exam:   Vital signs have been reviewed (see flow sheet for vitals). Sedation/ Anesthesia Plan:   MAC    Patient is an appropriate candidate for plan of sedation: yes    Preoperative Diagnosis:  L-spondylosis    Post-Op Dx: as above    Procedure Performed:  :Radiofrequency ablation of median branches at the levels of L3-4 and L4-5 bilateral under fluoroscopic guidance     Indication for the Procedure: The patient has ahistory of chronic low back pain that is not responding well to the conservative treatment. Patient's pain is mostly axial in nature. Pain is interfering with the activities of daily living. Physical examination revealed facet tenderness and facet loading is positive. Patient had undergone lumbar facet joint injections with pain relief that lasted for only a short period of time and had greater than 70% pain relief with confirmatory median branch blocks. Hence we decided to do radiofrequency abalation of median branches for long term pain releif.    The procedure and risks  were discussed with the patient and an informed consent was obtained. Procedure:     A meaningful communication was kept up with the patient throughout the procedure. The patient is placed in prone position and skin over the back was prepped and draped in sterile manner. Then using fluoroscopy the junction of the transverse process of the vertebra with the superior process of the facet joint was observed and the view was optimized. The skin and deep tissues posterior were infiltrated with 20 ml of  1% xylocaine. The RF canula with the 15 mm active tip was introduced through the skin wheal under fluoroscopy guidance such that the tip of the needle lies in the groove of the transverse process with the superior processes of the facet joint. Then a lateral view of the lumbar spine was obtained to make sure the tip of needle is not in the neural foramen. Then electric impedence was checked to make sure it is acceptable. Then a sensory stimulus was applied at 50 Hz up to 1 volt and concordant pain symptoms were reproduced. Then a motor stimulus was applied at 2 Hz up to 2 volts and no motor stimulation was seen in lower extremities. Some multifidus stimulus was seen. Then after negative aspiration 0.25%  Marcaine 4 cc was injected through the needle in divided doses. Then a  lesion was done at 80 degrees centigrade for 90 seconds. EBL-0    For the L4 median branch the junction of the transverse process of L5 with the superolateral possible facet joint was taken as a reference point and for S1 median branch the most lateral and superior aspect of S1 foramina was taken as a reference point,. For L3 median branch the junction of L4 transverse process and superior articular process of facet joint was taken as reference point and so on. Patient's vital signs and neurological status remained stable throughout the procedure and post procedural period.   The patient tolerated the procedure well and was discharged home in stable condition.     Electronically signed by Jl Dennis MD on 7/5/22 at 7:56 AM EDT

## 2022-07-05 NOTE — PROGRESS NOTES
0164: Patient arrived to Phase II recovery via cart. Eyes closed with spontaneous respirations even and unlabored. Report received from Joy Jimenez, [address]: VSS, patient continues to rest on cart with eyes closed. 0820: Patient awakens to voice. HOB elevated and patient assisted with dressing in bed.   0825: Snack and drink provided. Call light placed within reach. 0490: IV removed without complications. Band aid applied to site. 3711: Patient ambulatory to vehicle and discharged home in stable condition with wife.

## 2022-07-19 RX ORDER — CYCLOBENZAPRINE HCL 5 MG
5 TABLET ORAL 3 TIMES DAILY PRN
Qty: 90 TABLET | Refills: 0 | Status: SHIPPED | OUTPATIENT
Start: 2022-07-19 | End: 2022-09-13 | Stop reason: SDUPTHER

## 2022-07-19 RX ORDER — ETODOLAC 400 MG/1
TABLET, FILM COATED ORAL
Qty: 90 TABLET | Refills: 1 | OUTPATIENT
Start: 2022-07-19

## 2022-07-19 RX ORDER — ETODOLAC 400 MG/1
TABLET, FILM COATED ORAL
Qty: 90 TABLET | Refills: 1 | Status: SHIPPED | OUTPATIENT
Start: 2022-07-19 | End: 2022-09-19

## 2022-07-26 ENCOUNTER — OFFICE VISIT (OUTPATIENT)
Dept: PHYSICAL MEDICINE AND REHAB | Age: 52
End: 2022-07-26
Payer: MEDICAID

## 2022-07-26 VITALS
BODY MASS INDEX: 25.62 KG/M2 | DIASTOLIC BLOOD PRESSURE: 82 MMHG | WEIGHT: 183 LBS | HEIGHT: 71 IN | SYSTOLIC BLOOD PRESSURE: 118 MMHG

## 2022-07-26 DIAGNOSIS — M79.605 PAIN IN BOTH LOWER EXTREMITIES: ICD-10-CM

## 2022-07-26 DIAGNOSIS — M79.604 PAIN IN BOTH LOWER EXTREMITIES: ICD-10-CM

## 2022-07-26 DIAGNOSIS — M47.816 SPONDYLOSIS OF LUMBAR REGION WITHOUT MYELOPATHY OR RADICULOPATHY: ICD-10-CM

## 2022-07-26 DIAGNOSIS — G89.29 CHRONIC PAIN OF BOTH KNEES: ICD-10-CM

## 2022-07-26 DIAGNOSIS — M54.50 LUMBAR PAIN: ICD-10-CM

## 2022-07-26 DIAGNOSIS — M25.561 CHRONIC PAIN OF BOTH KNEES: ICD-10-CM

## 2022-07-26 DIAGNOSIS — M25.562 CHRONIC PAIN OF BOTH KNEES: ICD-10-CM

## 2022-07-26 DIAGNOSIS — M54.17 LUMBOSACRAL RADICULITIS: Primary | ICD-10-CM

## 2022-07-26 DIAGNOSIS — M48.062 LUMBAR STENOSIS WITH NEUROGENIC CLAUDICATION: ICD-10-CM

## 2022-07-26 DIAGNOSIS — M51.36 ANNULAR TEAR OF LUMBAR DISC: ICD-10-CM

## 2022-07-26 DIAGNOSIS — M47.819 FACET ARTHROPATHY OF SPINE: ICD-10-CM

## 2022-07-26 PROCEDURE — 99214 OFFICE O/P EST MOD 30 MIN: CPT | Performed by: NURSE PRACTITIONER

## 2022-07-26 PROCEDURE — G8427 DOCREV CUR MEDS BY ELIG CLIN: HCPCS | Performed by: NURSE PRACTITIONER

## 2022-07-26 PROCEDURE — G8419 CALC BMI OUT NRM PARAM NOF/U: HCPCS | Performed by: NURSE PRACTITIONER

## 2022-07-26 PROCEDURE — 4004F PT TOBACCO SCREEN RCVD TLK: CPT | Performed by: NURSE PRACTITIONER

## 2022-07-26 PROCEDURE — 3017F COLORECTAL CA SCREEN DOC REV: CPT | Performed by: NURSE PRACTITIONER

## 2022-07-26 ASSESSMENT — ENCOUNTER SYMPTOMS: BACK PAIN: 1

## 2022-07-26 NOTE — PROGRESS NOTES
[address] [facility] [address]  Dept: [phone]  Dept Fax: [phone]: [phone]    Visit Date: 7/26/2022    Functionality Assessment/Goals Worksheet     On a scale of 0 (Does not Interfere) to 10 (Completely Interferes)     1. Which number describes how during the past week pain has interfered with       the following:  A. General Activity:  6  B. Mood: 6  C. Walking Ability:  4  D. Normal Work (Includes both work outside the home and housework):  7  E. Relations with Other People:   3  F. Sleep:   6  G. Enjoyment of Life:   5    2. Patient Prefers to Take their Pain Medications:     [x]  On a regular basis   []  Only when necessary    []  Does not take pain medications    3. What are the Patient's Goals/Expectations for Visiting Pain Management? []  Learn about my pain    [x]  Receive Medication   []  Physical Therapy     []  Treat Depression   [x]  Receive Injections    []  Treat Sleep   []  Deal with Anxiety and Stress   []  Treat Opoid Dependence/Addiction   []  Other:      HPI:   Mike Morley is a 47 y.o. male is here today for    Chief Complaint: Low back pain, leg pain     HPI   FU from bilateral L-facet RFA from 7/5/2022. Receiving 85% relief of pain in low back and states that it is much better and tolerable. Can tolerate more activity with less pain doing more work around house hung some drywall on ceiling when had a bathroom leak. Continues to have tingling and numbness throughout legs posterior- main complaint and bothersome at times. Pain increases with bending, lifting, twisting , walking, standing, sitting, getting up and down, and housework or working at job. Burning and fire pain Feels like toes are asleep. Continues Neurontin, Etodalac and THC which helps         Medications reviewed. Patient denies side effects with medications.  Patient states he is taking medications as prescribed. Hedenies receiving pain medications from other sources. He denies any ER visits since last visit. Pain scale with out pain medications or at its worst is 4-5/10. Low back     Last dose of Neurontin was today       The patienthas No Known Allergies. Subjective:      Review of Systems   Constitutional: Negative. Genitourinary:  Negative for difficulty urinating. Musculoskeletal:  Positive for arthralgias, back pain, gait problem, joint swelling and myalgias. Negative for neck pain and neck stiffness. No assist devices    Skin: Negative. Neurological:  Positive for weakness and numbness. Psychiatric/Behavioral:  Positive for sleep disturbance. The patient is not nervous/anxious. Objective:     Vitals:    07/26/22 1118   BP: 118/82   Weight: 183 lb (83 kg)   Height: 5' 11\" (1.803 m)       Physical Exam  Vitals reviewed. Constitutional:       General: He is not in acute distress. Appearance: Normal appearance. He is well-developed. He is not diaphoretic. HENT:      Head: Normocephalic and atraumatic. Not macrocephalic and not microcephalic. Right Ear: External ear normal.      Left Ear: External ear normal.      Mouth/Throat:      Mouth: Mucous membranes are moist.   Eyes:      General:         Right eye: No discharge. Left eye: No discharge. Conjunctiva/sclera: Conjunctivae normal.   Neck:      Trachea: No tracheal deviation. Cardiovascular:      Rate and Rhythm: Normal rate and regular rhythm. Pulses: Normal pulses. Heart sounds: Normal heart sounds. Pulmonary:      Effort: Pulmonary effort is normal. No respiratory distress. Abdominal:      General: Abdomen is flat. Palpations: Abdomen is soft. Musculoskeletal:         General: Tenderness present. Cervical back: No rigidity or tenderness. Muscular tenderness present. Lumbar back: Spasms, tenderness and bony tenderness present. Decreased range of motion. Positive right straight leg raise test and positive left straight leg raise test.        Back:       Right upper leg: Tenderness and bony tenderness present. Left upper leg: Tenderness and bony tenderness present. Right lower leg: Tenderness and bony tenderness present. Left lower leg: Tenderness and bony tenderness present. Legs:    Skin:     General: Skin is warm and dry. Coloration: Skin is not pale. Findings: No rash. Neurological:      Mental Status: He is alert and oriented to person, place, and time. Cranial Nerves: No cranial nerve deficit. Sensory: Sensory deficit present. Motor: Weakness present. Coordination: Coordination abnormal.      Gait: Gait abnormal.      Comments: 4/5 strength left lower extremity, 3-4 right lower     Psychiatric:         Attention and Perception: He is attentive. Speech: Speech normal.         Behavior: Behavior normal.         Thought Content: Thought content normal.         Judgment: Judgment normal.     GUMARO  Patricks test  positive  Yeoman's  or Gaenslen's positive       Assessment:     1. Lumbosacral radiculitis    2. Lumbar stenosis with neurogenic claudication    3. Annular tear of lumbar disc    4. Pain in both lower extremities    5. Spondylosis of lumbar region without myelopathy or radiculopathy    6. Facet arthropathy of spine    7. Lumbar pain    8. Chronic pain of both knees            Plan:      OARRS reviewed. Current MED: 0  Patient was not offered naloxone for home. Discussed long term side effects of medications, tolerance, dependency and addiction. Previous UDS reviewed  UDS preformed today for compliance. Patient told can not receive any pain medications from any other source. No evidence of abuse, diversion or aberrant behavior.   Medications and/or procedures to improve function and quality of life- patient understanding with this and that may not be pain free  Discussed with patient about safe storage of medications at home  Discussed possible weaning of medication dosing dependent on treatment/procedure results. Discussed with patient about risks with procedure including infection, reaction to medication, increased pain, or bleeding. Procedure notes reviewed in detail   Receiving 85% relief or more of low back pain since L-facet RFA. Ordered again Lumbar MRI d/t increased leg pain/ radicular pain. Did not hear back from past.   Very short term relief from LESI X 2 in past.  Reviewed previous EMG discussed possible new one in future. Continue Neurontin 300 mg in am and afternoon and 600 at bedtime- filled 7/15/2022. Resume flexeril 5 mg TID prn - has plenty  Continue Etodalac prn  Uses THC. Meds. Prescribed:   No orders of the defined types were placed in this encounter. Return for After lumbar MRI. Britt Porter                Electronically signed by ANGELINA Slater CNP on7/26/2022 at 11:45 AM

## 2022-08-25 RX ORDER — GABAPENTIN 300 MG/1
300 CAPSULE ORAL 3 TIMES DAILY
Qty: 90 CAPSULE | Refills: 1 | Status: SHIPPED | OUTPATIENT
Start: 2022-08-25 | End: 2022-10-17 | Stop reason: SDUPTHER

## 2022-09-07 ENCOUNTER — OFFICE VISIT (OUTPATIENT)
Dept: UROLOGY | Age: 52
End: 2022-09-07
Payer: MEDICAID

## 2022-09-07 VITALS
HEIGHT: 71 IN | SYSTOLIC BLOOD PRESSURE: 128 MMHG | BODY MASS INDEX: 24.92 KG/M2 | DIASTOLIC BLOOD PRESSURE: 78 MMHG | WEIGHT: 178 LBS

## 2022-09-07 DIAGNOSIS — N32.81 OAB (OVERACTIVE BLADDER): ICD-10-CM

## 2022-09-07 DIAGNOSIS — N32.0 BLADDER NECK CONTRACTURE: Primary | ICD-10-CM

## 2022-09-07 LAB
BILIRUBIN URINE: ABNORMAL
BLOOD URINE, POC: NEGATIVE
CHARACTER, URINE: CLEAR
COLOR, URINE: YELLOW
GLUCOSE URINE: NEGATIVE MG/DL
KETONES, URINE: NEGATIVE
LEUKOCYTE CLUMPS, URINE: ABNORMAL
NITRITE, URINE: NEGATIVE
PH, URINE: 6 (ref 5–9)
POST VOID RESIDUAL (PVR): 0 ML
PROTEIN, URINE: NEGATIVE MG/DL
SPECIFIC GRAVITY, URINE: 1.02 (ref 1–1.03)
UROBILINOGEN, URINE: 0.2 EU/DL (ref 0–1)

## 2022-09-07 PROCEDURE — 99214 OFFICE O/P EST MOD 30 MIN: CPT | Performed by: UROLOGY

## 2022-09-07 PROCEDURE — 4004F PT TOBACCO SCREEN RCVD TLK: CPT | Performed by: UROLOGY

## 2022-09-07 PROCEDURE — 51798 US URINE CAPACITY MEASURE: CPT | Performed by: UROLOGY

## 2022-09-07 PROCEDURE — 81003 URINALYSIS AUTO W/O SCOPE: CPT | Performed by: UROLOGY

## 2022-09-07 PROCEDURE — G8420 CALC BMI NORM PARAMETERS: HCPCS | Performed by: UROLOGY

## 2022-09-07 PROCEDURE — G8427 DOCREV CUR MEDS BY ELIG CLIN: HCPCS | Performed by: UROLOGY

## 2022-09-07 PROCEDURE — 3017F COLORECTAL CA SCREEN DOC REV: CPT | Performed by: UROLOGY

## 2022-09-07 RX ORDER — SILDENAFIL 100 MG/1
100 TABLET, FILM COATED ORAL DAILY PRN
Qty: 30 TABLET | Refills: 2 | Status: SHIPPED | OUTPATIENT
Start: 2022-09-07

## 2022-09-07 NOTE — PROGRESS NOTES
Dr. Frankey Letters, MD  [facility])  Urology Clinic      Patient:  Mitch Canela  YOB: 1970  Date: 9/7/2022    HISTORY OF PRESENT ILLNESS:   The patient is a 47 y.o. male who presents today for evaluation of the following problem(s): Severe BPH requiring 10 minutes to go. On Flomax and this isnt helping much. We proceeded with TURP and he is doing better but still having some OAB and takes Vesicare for this. He then developed a [address] and this was opened and now his is doing excellent. Overall the problem(s) : are stable. Associated Symptoms: No dysuria, gross hematuria. Pain Severity: 0/10    Summary of old records:   Took flomax. Imaging/Labs reviewed during today's visit:  I have independently reviewed and verified the following films during today's visit.   None    Last several PSA's:  No results found for: PSA    Last total testosterone:  No results found for: TESTOSTERONE    Urinalysis today:  Results for POC orders placed in visit on 09/07/22   POCT Urinalysis No Micro (Auto)   Result Value Ref Range    Glucose, Ur Negative NEGATIVE mg/dl    Bilirubin Urine Large (A)     Ketones, Urine Negative NEGATIVE    Specific Gravity, Urine 1.025 1.002 - 1.030    Blood, UA POC Negative NEGATIVE    pH, Urine 6.00 5.0 - 9.0    Protein, Urine Negative NEGATIVE mg/dl    Urobilinogen, Urine 0.20 0.0 - 1.0 eu/dl    Nitrite, Urine Negative NEGATIVE    Leukocyte Clumps, Urine Small (A) NEGATIVE    Color, Urine Yellow YELLOW-STRAW    Character, Urine Clear [id number].[id number]   poct post void residual   Result Value Ref Range    post void residual 0 ml         Last BUN and creatinine:  Lab Results   Component Value Date    BUN 14 10/26/2021     Lab Results   Component Value Date    CREATININE 1.1 10/26/2021       Imaging Reviewed during this Office Visit:   (results were independently reviewed by physician and radiology report verified)    PAST MEDICAL, FAMILY AND SOCIAL HISTORY:  Past Medical History: Diagnosis Date    Cerebral artery occlusion with cerebral infarction ([facility] [address]     PT STATES TIA    Chronic back pain     DDD (degenerative disc disease), lumbar     Enlarged prostate     Fibromyalgia     Hyperlipidemia     Hypertension     Kidney stones     Lumbago      Past Surgical History:   Procedure Laterality Date    COLONOSCOPY      LITHOTRIPSY      PAIN MANAGEMENT PROCEDURE N/A 1/5/2021    LESI L5 #1 performed by Aggie Morgan MD at [facility] N/A 3/26/2021    LESI # 2 @ L4 performed by Aggie Morgan MD at [facility] Bilateral 5/3/2021    bilateral L-facet MBB # 1 @ L3-4, L4-5 performed by Aggie Morgan MD at [facility] Bilateral 4/26/2022    bilateral L-facet MBB # 2 @ L3-4, L4-5 performed by Aggie Morgan MD at [place] [address] Bilateral 7/5/2022    Bilateral Lumbar Facet Radiofrequency Ablation Lumbar 3-4, 4-5. performed by Aggie Morgan MD at [facility]  2017    TURP N/A 5/26/2021    CYSTOSCOPY TRANSURETHRAL RESECTION PROSTATE performed by Mitzi Corona MD at [address] [place] [address] 11/10/2021    CYSTOSCOPY TRANSURETHRAL RESECTION PROSTATE TRANSURETHERAL INCISION OF BLADDER NECK CONTRACTURE performed by Mitzi Corona MD at [facility] History   Problem Relation Age of Onset    Cancer Mother     Diabetes Mother     Heart Disease Neg Hx     High Blood Pressure Neg Hx     Stroke Neg Hx      Outpatient Medications Marked as Taking for the 9/7/22 encounter (Office Visit) with Mitzi Corona MD   Medication Sig Dispense Refill    gabapentin (NEURONTIN) 300 MG capsule TAKE 1 CAPSULE BY MOUTH 3 TIMES DAILY FOR 30 DAYS.  90 capsule 1    DULoxetine (CYMBALTA) 60 MG extended release capsule TAKE 1 CAPSULE BY MOUTH EVERY DAY      solifenacin (VESICARE) 10 MG tablet Take 1 tablet by mouth daily 30 tablet 5    DULoxetine (CYMBALTA) 30 MG extended release capsule 2 times daily Pt takes 60mg in am and 30mg in the pm      atorvastatin (LIPITOR) 20 MG tablet Take 20 mg by mouth daily      traZODone (DESYREL) 50 MG tablet Take 50 mg by mouth nightly as needed          Patient has no known allergies. Social History     Tobacco Use   Smoking Status Every Day    Packs/day: 0.50    Types: Cigarettes   Smokeless Tobacco Never       Social History     Substance and Sexual Activity   Alcohol Use Not Currently       REVIEW OF SYSTEMS:  Constitutional: negative  Eyes: negative  Respiratory: negative  Cardiovascular: negative  Gastrointestinal: negative  Musculoskeletal: negative  Genitourinary: negative except for what is in HPI  Skin: negative   Neurological: negative  Hematological/Lymphatic: negative  Psychological: negative    Physical Exam:    This a 47 y.o. male   Vitals:    09/07/22 1101   BP: 128/78     Constitutional: Patient in no acute distress; Neuro: alert and oriented to person place and time. Psych: Mood and affect normal.  Skin: Normal  Lungs: Respiratory effort normal  Cardiovascular:  Normal peripheral pulses  Abdomen: Soft, non-tender, non-distended with no CVA, flank pain, hepatosplenomegaly or hernia. Kidneys normal.  Bladder non-tender and not distended. Lymphatics: no palpable lymphadenopathy      Assessment and Plan      1. Bladder neck contracture    2. OAB (overactive bladder)           Plan:      No follow-ups on file. Stopped Flomax  Continue Vesicare. Start Myrbetriq 50mg. Discussed meds for ED. Will trial on Viagra 100mg.           Dr. Michael Kelly MD

## 2022-09-08 RX ORDER — SOLIFENACIN SUCCINATE 10 MG/1
10 TABLET, FILM COATED ORAL DAILY
Qty: 30 TABLET | Refills: 5 | Status: SHIPPED | OUTPATIENT
Start: 2022-09-08 | End: 2023-09-08

## 2022-09-08 NOTE — TELEPHONE ENCOUNTER
Hermila Davenport called requesting a refill on the following medications:  Requested Prescriptions     Pending Prescriptions Disp Refills    solifenacin (VESICARE) 10 MG tablet 30 tablet 5     Sig: Take 1 tablet by mouth daily     Pharmacy verified:  [email or web address]      Date of last visit: 09/07/2022  Date of next visit (if applicable): 26/68/2150

## 2022-09-13 ENCOUNTER — OFFICE VISIT (OUTPATIENT)
Dept: PHYSICAL MEDICINE AND REHAB | Age: 52
End: 2022-09-13
Payer: MEDICAID

## 2022-09-13 ENCOUNTER — TELEPHONE (OUTPATIENT)
Dept: UROLOGY | Age: 52
End: 2022-09-13

## 2022-09-13 VITALS
SYSTOLIC BLOOD PRESSURE: 122 MMHG | HEIGHT: 71 IN | DIASTOLIC BLOOD PRESSURE: 82 MMHG | BODY MASS INDEX: 24.92 KG/M2 | WEIGHT: 178 LBS

## 2022-09-13 DIAGNOSIS — M54.17 LUMBOSACRAL RADICULITIS: Primary | ICD-10-CM

## 2022-09-13 DIAGNOSIS — M47.819 FACET ARTHROPATHY OF SPINE: ICD-10-CM

## 2022-09-13 DIAGNOSIS — M79.604 PAIN IN BOTH LOWER EXTREMITIES: ICD-10-CM

## 2022-09-13 DIAGNOSIS — M54.2 NECK PAIN: ICD-10-CM

## 2022-09-13 DIAGNOSIS — M51.36 ANNULAR TEAR OF LUMBAR DISC: ICD-10-CM

## 2022-09-13 DIAGNOSIS — M48.062 LUMBAR STENOSIS WITH NEUROGENIC CLAUDICATION: ICD-10-CM

## 2022-09-13 DIAGNOSIS — M47.816 SPONDYLOSIS OF LUMBAR REGION WITHOUT MYELOPATHY OR RADICULOPATHY: ICD-10-CM

## 2022-09-13 DIAGNOSIS — M79.605 PAIN IN BOTH LOWER EXTREMITIES: ICD-10-CM

## 2022-09-13 DIAGNOSIS — M54.50 LUMBAR PAIN: ICD-10-CM

## 2022-09-13 DIAGNOSIS — G89.4 CHRONIC PAIN SYNDROME: ICD-10-CM

## 2022-09-13 PROCEDURE — G8420 CALC BMI NORM PARAMETERS: HCPCS | Performed by: NURSE PRACTITIONER

## 2022-09-13 PROCEDURE — 4004F PT TOBACCO SCREEN RCVD TLK: CPT | Performed by: NURSE PRACTITIONER

## 2022-09-13 PROCEDURE — 99214 OFFICE O/P EST MOD 30 MIN: CPT | Performed by: NURSE PRACTITIONER

## 2022-09-13 PROCEDURE — 3017F COLORECTAL CA SCREEN DOC REV: CPT | Performed by: NURSE PRACTITIONER

## 2022-09-13 PROCEDURE — G8427 DOCREV CUR MEDS BY ELIG CLIN: HCPCS | Performed by: NURSE PRACTITIONER

## 2022-09-13 RX ORDER — CYCLOBENZAPRINE HCL 5 MG
5 TABLET ORAL 3 TIMES DAILY PRN
Qty: 90 TABLET | Refills: 0 | Status: SHIPPED | OUTPATIENT
Start: 2022-09-13 | End: 2022-10-11 | Stop reason: SDUPTHER

## 2022-09-13 RX ORDER — CYCLOBENZAPRINE HCL 5 MG
TABLET ORAL
Qty: 90 TABLET | Refills: 0 | OUTPATIENT
Start: 2022-09-13

## 2022-09-13 RX ORDER — ETODOLAC 400 MG/1
TABLET, FILM COATED ORAL
Qty: 90 TABLET | Refills: 1 | OUTPATIENT
Start: 2022-09-13

## 2022-09-13 ASSESSMENT — ENCOUNTER SYMPTOMS: BACK PAIN: 1

## 2022-09-13 NOTE — TELEPHONE ENCOUNTER
Patient came to the office stating the myrbetriq 50 mg is working. He would like more samples.  4 boxes given after speaking to Shellie Saavedra CNP

## 2022-09-13 NOTE — PROGRESS NOTES
[address] [facility] [address]  Dept: [phone]  Dept Fax: [phone]: [phone]    Visit Date: 9/13/2022    Functionality Assessment/Goals Worksheet     On a scale of 0 (Does not Interfere) to 10 (Completely Interferes)     1. Which number describes how during the past week pain has interfered with       the following:  A. General Activity:  8  B. Mood: 7  C. Walking Ability:  6  D. Normal Work (Includes both work outside the home and housework):  2  E. Relations with Other People:   4  F. Sleep:   5  G. Enjoyment of Life:   9    2. Patient Prefers to Take their Pain Medications:     [x]  On a regular basis   [x]  Only when necessary    []  Does not take pain medications    3. What are the Patient's Goals/Expectations for Visiting Pain Management? []  Learn about my pain    [x]  Receive Medication   []  Physical Therapy     []  Treat Depression   [x]  Receive Injections    []  Treat Sleep   []  Deal with Anxiety and Stress   []  Treat Opoid Dependence/Addiction   []  Other:      HPI:   Jie Bright is a 47 y.o. male is here today for    Chief Complaint: Low back pain, leg pain, neck pain     Significant other present     HPI   1.5 month FU to review is lumbar MRI. He never had his lumbar MRI completed that was ordered last visit. States he has not heard anything but has been having phone issues. Main complaint is pain down bilateral legs- numbness tingling burning pain from low back down to toes. States that pin is better than it has been but ups and downs depending on activity   Pain in low back has been tolerable as he continue sto receive about 80% relief form L-facet RFA      Has complaints of posterior neck pain- aching and shooting    Pain increases with bending, lifting, twisting , walking, standing, and housework or working at job.       Continues Neurontin, Etodalac and THC which helps       Medications reviewed. Patient denies side effects with medications. Patient states he is taking medications as prescribed. Hedenies receiving pain medications from other sources. He denies any ER visits since last visit. Pain scale with out pain medications or at its worst is 6/10. Last dose of Neurontin was today         The patienthas No Known Allergies. Subjective:      Review of Systems   Constitutional: Negative. Genitourinary:  Negative for difficulty urinating. Musculoskeletal:  Positive for arthralgias, back pain, gait problem, joint swelling and myalgias. Negative for neck pain and neck stiffness. No assist devices    Skin: Negative. Neurological:  Positive for weakness and numbness. Psychiatric/Behavioral:  Positive for sleep disturbance. The patient is not nervous/anxious. Objective:     Vitals:    09/13/22 1114   BP: 122/82   Weight: 178 lb (80.7 kg)   Height: 5' 11\" (1.803 m)       Physical Exam  Vitals reviewed. Constitutional:       General: He is not in acute distress. Appearance: Normal appearance. He is well-developed. He is not diaphoretic. HENT:      Head: Normocephalic and atraumatic. Not macrocephalic and not microcephalic. Right Ear: External ear normal.      Left Ear: External ear normal.      Mouth/Throat:      Mouth: Mucous membranes are moist.   Eyes:      General:         Right eye: No discharge. Left eye: No discharge. Conjunctiva/sclera: Conjunctivae normal.   Neck:      Trachea: No tracheal deviation. Cardiovascular:      Rate and Rhythm: Normal rate and regular rhythm. Pulses: Normal pulses. Heart sounds: Normal heart sounds. Pulmonary:      Effort: Pulmonary effort is normal. No respiratory distress. Abdominal:      General: Abdomen is flat. Palpations: Abdomen is soft. Musculoskeletal:         General: Tenderness present.       Cervical back: Rigidity, tenderness and bony tenderness present. Pain with movement and muscular tenderness present. Decreased range of motion. Lumbar back: Spasms, tenderness and bony tenderness present. Decreased range of motion. Positive right straight leg raise test and positive left straight leg raise test.        Back:       Right upper leg: Tenderness and bony tenderness present. Left upper leg: Tenderness and bony tenderness present. Right lower leg: Tenderness and bony tenderness present. Left lower leg: Tenderness and bony tenderness present. Legs:    Skin:     General: Skin is warm and dry. Coloration: Skin is not pale. Findings: No rash. Neurological:      Mental Status: He is alert and oriented to person, place, and time. Cranial Nerves: No cranial nerve deficit. Sensory: Sensory deficit present. Motor: Weakness present. Coordination: Coordination abnormal.      Gait: Gait abnormal.      Comments: 4/5 strength left lower extremity, 3-4 right lower     Psychiatric:         Attention and Perception: He is attentive. Speech: Speech normal.         Behavior: Behavior normal.         Thought Content: Thought content normal.         Judgment: Judgment normal.     GUMARO  Patricks test  positive  Yeoman's  or Gaenslen's positive         Assessment:     1. Lumbosacral radiculitis    2. Lumbar stenosis with neurogenic claudication    3. Annular tear of lumbar disc    4. Pain in both lower extremities    5. Spondylosis of lumbar region without myelopathy or radiculopathy    6. Facet arthropathy of spine    7. Lumbar pain    8. Chronic pain syndrome    9. Neck pain            Plan:      OARRS reviewed. Current MED: 0  Patient was offered naloxone for home. Discussed long term side effects of medications, tolerance, dependency and addiction. Previous UDS reviewed  UDS preformed today for compliance.   Patient told can not receive any pain medications from any other source. No evidence of abuse, diversion or aberrant behavior. Medications and/or procedures to improve function and quality of life- patient understanding with this and that may not be pain free  Discussed with patient about safe storage of medications at home  Discussed possible weaning of medication dosing dependent on treatment/procedure results. Discussed with patient about risks with procedure including infection, reaction to medication, increased pain, or bleeding. Procedure notes reviewed in detail   Continues to receive over 80% relief of low back pain from L-facet RFA   Main complaint remains radicular pain- previous EMG normal.   Ordered lumbar MRI again. Very short term relief from LESI X 2 in past. May need surgical evaluation in future. Reviewed lumbar CT scan from 2020   Ordered neck xray d/t pain   Recommended therapy. But he states it makes pain worse. Continue Neurontin 300 mg in am and afternoon and 600 at bedtime- filled 8/25/2022. May need to titrate in future   Continue flexeril 5 mg TID prn - ordered refill   Continue Etodalac prn  Uses THC. Meds. Prescribed:   Orders Placed This Encounter   Medications    cyclobenzaprine (FLEXERIL) 5 MG tablet     Sig: Take 1 tablet by mouth 3 times daily as needed for Muscle spasms     Dispense:  90 tablet     Refill:  0         Return for After lumbar MRi and neck xray .                Electronically signed by ANGELINA Eastman CNP on9/13/2022 at 11:38 AM

## 2022-09-15 DIAGNOSIS — M54.2 NECK PAIN: ICD-10-CM

## 2022-09-19 RX ORDER — ETODOLAC 400 MG/1
TABLET, FILM COATED ORAL
Qty: 90 TABLET | Refills: 1 | Status: SHIPPED | OUTPATIENT
Start: 2022-09-19 | End: 2022-10-19

## 2022-10-11 RX ORDER — CYCLOBENZAPRINE HCL 5 MG
5 TABLET ORAL 3 TIMES DAILY PRN
Qty: 90 TABLET | Refills: 0 | Status: SHIPPED | OUTPATIENT
Start: 2022-10-13 | End: 2022-11-12

## 2022-10-12 ENCOUNTER — HOSPITAL ENCOUNTER (OUTPATIENT)
Dept: MRI IMAGING | Age: 52
Discharge: HOME | End: 2022-10-12
Payer: MEDICAID

## 2022-10-12 DIAGNOSIS — M79.604 PAIN IN BOTH LOWER EXTREMITIES: ICD-10-CM

## 2022-10-12 DIAGNOSIS — M48.062 LUMBAR STENOSIS WITH NEUROGENIC CLAUDICATION: ICD-10-CM

## 2022-10-12 DIAGNOSIS — M51.36 ANNULAR TEAR OF LUMBAR DISC: ICD-10-CM

## 2022-10-12 DIAGNOSIS — M79.605 PAIN IN BOTH LOWER EXTREMITIES: ICD-10-CM

## 2022-10-12 DIAGNOSIS — M54.17 LUMBOSACRAL RADICULITIS: ICD-10-CM

## 2022-10-12 PROCEDURE — 72148 MRI LUMBAR SPINE W/O DYE: CPT

## 2022-10-17 ENCOUNTER — TELEPHONE (OUTPATIENT)
Dept: PHYSICAL MEDICINE AND REHAB | Age: 52
End: 2022-10-17

## 2022-10-17 ENCOUNTER — OFFICE VISIT (OUTPATIENT)
Dept: PHYSICAL MEDICINE AND REHAB | Age: 52
End: 2022-10-17
Payer: MEDICAID

## 2022-10-17 VITALS
WEIGHT: 178 LBS | SYSTOLIC BLOOD PRESSURE: 122 MMHG | HEIGHT: 71 IN | DIASTOLIC BLOOD PRESSURE: 74 MMHG | BODY MASS INDEX: 24.92 KG/M2

## 2022-10-17 DIAGNOSIS — M48.062 LUMBAR STENOSIS WITH NEUROGENIC CLAUDICATION: ICD-10-CM

## 2022-10-17 DIAGNOSIS — M47.819 FACET ARTHROPATHY OF SPINE: ICD-10-CM

## 2022-10-17 DIAGNOSIS — M54.2 NECK PAIN: ICD-10-CM

## 2022-10-17 DIAGNOSIS — M79.604 PAIN IN BOTH LOWER EXTREMITIES: ICD-10-CM

## 2022-10-17 DIAGNOSIS — M79.605 PAIN IN BOTH LOWER EXTREMITIES: ICD-10-CM

## 2022-10-17 DIAGNOSIS — M48.061 LUMBAR FORAMINAL STENOSIS: ICD-10-CM

## 2022-10-17 DIAGNOSIS — M54.50 LUMBAR PAIN: ICD-10-CM

## 2022-10-17 DIAGNOSIS — G89.4 CHRONIC PAIN SYNDROME: ICD-10-CM

## 2022-10-17 DIAGNOSIS — M47.816 SPONDYLOSIS OF LUMBAR REGION WITHOUT MYELOPATHY OR RADICULOPATHY: ICD-10-CM

## 2022-10-17 DIAGNOSIS — M51.36 ANNULAR TEAR OF LUMBAR DISC: ICD-10-CM

## 2022-10-17 DIAGNOSIS — M54.17 LUMBOSACRAL RADICULITIS: Primary | ICD-10-CM

## 2022-10-17 PROCEDURE — 4004F PT TOBACCO SCREEN RCVD TLK: CPT | Performed by: NURSE PRACTITIONER

## 2022-10-17 PROCEDURE — G8427 DOCREV CUR MEDS BY ELIG CLIN: HCPCS | Performed by: NURSE PRACTITIONER

## 2022-10-17 PROCEDURE — 3017F COLORECTAL CA SCREEN DOC REV: CPT | Performed by: NURSE PRACTITIONER

## 2022-10-17 PROCEDURE — 99214 OFFICE O/P EST MOD 30 MIN: CPT | Performed by: NURSE PRACTITIONER

## 2022-10-17 PROCEDURE — G8484 FLU IMMUNIZE NO ADMIN: HCPCS | Performed by: NURSE PRACTITIONER

## 2022-10-17 PROCEDURE — G8420 CALC BMI NORM PARAMETERS: HCPCS | Performed by: NURSE PRACTITIONER

## 2022-10-17 RX ORDER — GABAPENTIN 300 MG/1
300 CAPSULE ORAL 3 TIMES DAILY
Qty: 90 CAPSULE | Refills: 1 | Status: SHIPPED | OUTPATIENT
Start: 2022-10-17 | End: 2022-11-16

## 2022-10-17 ASSESSMENT — ENCOUNTER SYMPTOMS: BACK PAIN: 1

## 2022-10-17 NOTE — PROGRESS NOTES
myalgias. Negative for neck pain and neck stiffness. No assist devices    Skin: Negative. Neurological:  Positive for weakness and numbness. Psychiatric/Behavioral:  Positive for sleep disturbance. The patient is not nervous/anxious. Objective:     Vitals:    10/17/22 0914   BP: 122/74   Weight: 178 lb (80.7 kg)   Height: 5' 11\" (1.803 m)       Physical Exam  Vitals reviewed. Constitutional:       General: He is not in acute distress. Appearance: Normal appearance. He is well-developed. He is not diaphoretic. HENT:      Head: Normocephalic and atraumatic. Not macrocephalic and not microcephalic. Right Ear: External ear normal.      Left Ear: External ear normal.      Mouth/Throat:      Mouth: Mucous membranes are moist.   Eyes:      General:         Right eye: No discharge. Left eye: No discharge. Conjunctiva/sclera: Conjunctivae normal.   Neck:      Trachea: No tracheal deviation. Cardiovascular:      Rate and Rhythm: Normal rate and regular rhythm. Pulses: Normal pulses. Heart sounds: Normal heart sounds. Pulmonary:      Effort: Pulmonary effort is normal. No respiratory distress. Abdominal:      General: Abdomen is flat. Palpations: Abdomen is soft. Musculoskeletal:         General: Tenderness present. Cervical back: Rigidity, tenderness and bony tenderness present. Pain with movement and muscular tenderness present. Decreased range of motion. Lumbar back: Spasms, tenderness and bony tenderness present. Decreased range of motion. Positive right straight leg raise test and positive left straight leg raise test.        Back:       Right upper leg: Tenderness and bony tenderness present. Left upper leg: Tenderness and bony tenderness present. Right lower leg: Tenderness and bony tenderness present. Left lower leg: Tenderness and bony tenderness present. Legs:    Skin:     General: Skin is warm and dry.       Coloration: Skin is not pale. Findings: No rash. Neurological:      Mental Status: He is alert and oriented to person, place, and time. Cranial Nerves: No cranial nerve deficit. Sensory: Sensory deficit present. Motor: Weakness present. Coordination: Coordination abnormal.      Gait: Gait abnormal.      Comments: 4/5 strength left lower extremity, 3-4 right lower     Psychiatric:         Attention and Perception: He is attentive. Speech: Speech normal.         Behavior: Behavior normal.         Thought Content: Thought content normal.         Judgment: Judgment normal.     GUMARO  Patricks test  positive  Yeoman's  or Gaenslen's positive         Assessment:     1. Lumbosacral radiculitis    2. Lumbar foraminal stenosis    3. Annular tear of lumbar disc    4. Pain in both lower extremities    5. Lumbar stenosis with neurogenic claudication    6. Spondylosis of lumbar region without myelopathy or radiculopathy    7. Facet arthropathy of spine    8. Lumbar pain    9. Chronic pain syndrome    10. Neck pain            Plan:      OARRS reviewed. Current MED: 0  Patient was offered naloxone for home. Discussed long term side effects of medications, tolerance, dependency and addiction. Previous UDS reviewed  UDS preformed today for compliance. Patient told can not receive any pain medications from any other source. No evidence of abuse, diversion or aberrant behavior. Medications and/or procedures to improve function and quality of life- patient understanding with this and that may not be pain free  Discussed with patient about safe storage of medications at home  Discussed possible weaning of medication dosing dependent on treatment/procedure results. Discussed with patient about risks with procedure including infection, reaction to medication, increased pain, or bleeding.   Procedure notes reviewed in detail   Continues to receive over 80% relief of low back pain from L-facet RFA   Main complaint remains radicular pain- previous EMG normal. Very short term relief from LESI X 2 in past.  Reviewed Lumbar MRI, reviewed neck xray  Plan Bilateral TFLESI # 1 @ L5-S1. Procedure discussed. Continue Neurontin 300 mg in am and afternoon and 600 at bedtime- ordered refill  Continue flexeril 5 mg TID prn - ordered 10/13/2022  Continue Etodalac prn- has plenty- Will need to hold 5 days prior to procedure   Uses THC. Meds. Prescribed:   Orders Placed This Encounter   Medications    gabapentin (NEURONTIN) 300 MG capsule     Sig: Take 1 capsule by mouth 3 times daily for 30 days. Dispense:  90 capsule     Refill:  1         Return for Bilateral TFLESI # 1 @ L5-S1. , follow up after procedure.                Electronically signed by ANGELINA Corley CNP on10/17/2022 at 9:38 AM

## 2022-10-24 ENCOUNTER — TELEPHONE (OUTPATIENT)
Dept: UROLOGY | Age: 52
End: 2022-10-24

## 2022-10-24 NOTE — TELEPHONE ENCOUNTER
No prior auth required per [facility] for [employer]

## 2022-10-31 RX ORDER — ETODOLAC 400 MG/1
TABLET, FILM COATED ORAL
Qty: 90 TABLET | Refills: 1 | OUTPATIENT
Start: 2022-10-31

## 2022-11-14 RX ORDER — CYCLOBENZAPRINE HCL 5 MG
5 TABLET ORAL 3 TIMES DAILY PRN
Qty: 90 TABLET | Refills: 0 | Status: SHIPPED | OUTPATIENT
Start: 2022-11-14 | End: 2022-12-14

## 2022-11-23 ENCOUNTER — PREP FOR PROCEDURE (OUTPATIENT)
Dept: PHYSICAL MEDICINE AND REHAB | Age: 52
End: 2022-11-23

## 2022-11-23 NOTE — DISCHARGE INSTRUCTIONS
ANESTHESIA INSTRUCTIONS FOLLOWING SURGERY        Since you may experience some intermittent light-headedness for the next several hours, we suggest you plan on bed rest or quiet relaxation this evening. You must have a friend or relative stay with you tonight. Because of the sedation you have received, it is recommended that you do not drive a motor vehicle, operate any kind of machinery, or sign any contractual agreement for 24 hours following the procedure. You should not take alcoholic beverages tonight and only take sleeping medication that has been specifically prescribed for you by your physician. Call office [phone] if you have:  Temperature greater than 100.4  Persistent nausea and vomiting  Severe uncontrolled pain  Redness, tenderness, or signs of infection (pain, swelling, redness, odor or green/yellow discharge around the site)  Difficulty breathing, headache or visual disturbances  Hives  Persistent dizziness or light-headedness  Extreme fatigue  Any other questions or concerns you may have after discharge    In an emergency, call 911 or go to an Emergency Department at a nearby hospital    It is important to bring a complete, current list of your medications to any medical appointments or hospitalizations. REMINDER:   Carry a list of your medications and allergies with you at all times  Call your pharmacy at least 1 week in advance to refill prescriptions    Diet: Resume your usual diet. Good nutrition promotes healing. Increase fluid intake. Activity: Rest for 24 hrs then resume normal activity. HOME MEDICATIONS:      If on blood thinning products such as; Aspirin, NSAIDS, Plavix, Coumadin, Xarelto, Fish Oil, Multi-Vitamins or Herbal Supplements restart in 24 hours      Restart Metformin in 48 hours if you had procedure with dye. Restart Metformin in 24 hours if no dye used during procedure.         Education Materials Received: {yes/no:358345}  Belongings Returned: {yes/no:071244}          I understand and acknowledge receipt of the above instructions. Patient or Guardian Signature                                                         Date/Time                                                                                                                                            Physician's or R.N.'s Signature                                                                  Date/Time      The discharge instructions have been reviewed with the patient and/or Guardian. Patient and/or Guardian signed and retained a printed copy. Call office [phone] if you have:  Temperature greater than 100.4  Persistent nausea and vomiting  Severe uncontrolled pain  Redness, tenderness, or signs of infection (pain, swelling, redness, odor or green/yellow discharge around the site)  Difficulty breathing, headache or visual disturbances  Hives  Persistent dizziness or light-headedness  Extreme fatigue  Any other questions or concerns you may have after discharge    In an emergency, call 911 or go to an Emergency Department at a nearby hospital    It is important to bring a complete, current list of your medications to any medical appointments or hospitalizations. REMINDER:   Carry a list of your medications and allergies with you at all times  Call your pharmacy at least 1 week in advance to refill prescriptions    Diet: Resume your usual diet. Good nutrition promotes healing. Increase fluid intake. Activity: Rest for 24 hrs then resume normal activity. Education Materials Received: {yes/no:247540}  Belongings Returned: {yes/no:[id number]}          I understand and acknowledge receipt of the above instructions. Patient or Guardian Signature                                                         Date/Time                                                                                                                                            Physician's or R.N.'s Signature                                                                  Date/Time      The discharge instructions have been reviewed with the patient and/or Guardian. Patient and/or Guardian signed and retained a printed copy.

## 2022-11-28 ENCOUNTER — ANESTHESIA (OUTPATIENT)
Dept: OPERATING ROOM | Age: 52
End: 2022-11-28
Payer: MEDICAID

## 2022-11-28 ENCOUNTER — APPOINTMENT (OUTPATIENT)
Dept: GENERAL RADIOLOGY | Age: 52
End: 2022-11-28
Attending: PAIN MEDICINE
Payer: MEDICAID

## 2022-11-28 ENCOUNTER — ANESTHESIA EVENT (OUTPATIENT)
Dept: OPERATING ROOM | Age: 52
End: 2022-11-28
Payer: MEDICAID

## 2022-11-28 ENCOUNTER — HOSPITAL ENCOUNTER (OUTPATIENT)
Age: 52
Setting detail: OUTPATIENT SURGERY
Discharge: HOME | End: 2022-11-28
Attending: PAIN MEDICINE | Admitting: PAIN MEDICINE
Payer: MEDICAID

## 2022-11-28 VITALS
HEIGHT: 71 IN | OXYGEN SATURATION: 96 % | TEMPERATURE: 97.3 F | DIASTOLIC BLOOD PRESSURE: 75 MMHG | HEART RATE: 89 BPM | BODY MASS INDEX: 27.02 KG/M2 | WEIGHT: 193 LBS | RESPIRATION RATE: 16 BRPM | SYSTOLIC BLOOD PRESSURE: 139 MMHG

## 2022-11-28 PROCEDURE — 3700000000 HC ANESTHESIA ATTENDED CARE: Performed by: PAIN MEDICINE

## 2022-11-28 PROCEDURE — 3700000001 HC ADD 15 MINUTES (ANESTHESIA): Performed by: PAIN MEDICINE

## 2022-11-28 PROCEDURE — 3600000054 HC PAIN LEVEL 3 BASE: Performed by: PAIN MEDICINE

## 2022-11-28 PROCEDURE — 3600000055 HC PAIN LEVEL 3 ADDL 15 MIN: Performed by: PAIN MEDICINE

## 2022-11-28 PROCEDURE — 2580000003 HC RX 258

## 2022-11-28 PROCEDURE — 2500000003 HC RX 250 WO HCPCS: Performed by: PAIN MEDICINE

## 2022-11-28 PROCEDURE — 6360000004 HC RX CONTRAST MEDICATION: Performed by: PAIN MEDICINE

## 2022-11-28 PROCEDURE — 6360000002 HC RX W HCPCS

## 2022-11-28 PROCEDURE — 7100000011 HC PHASE II RECOVERY - ADDTL 15 MIN: Performed by: PAIN MEDICINE

## 2022-11-28 PROCEDURE — 7100000010 HC PHASE II RECOVERY - FIRST 15 MIN: Performed by: PAIN MEDICINE

## 2022-11-28 PROCEDURE — 2709999900 HC NON-CHARGEABLE SUPPLY: Performed by: PAIN MEDICINE

## 2022-11-28 PROCEDURE — 64483 NJX AA&/STRD TFRM EPI L/S 1: CPT | Performed by: PAIN MEDICINE

## 2022-11-28 PROCEDURE — 6360000002 HC RX W HCPCS: Performed by: PAIN MEDICINE

## 2022-11-28 PROCEDURE — 3209999900 FLUORO FOR SURGICAL PROCEDURES

## 2022-11-28 RX ORDER — DEXAMETHASONE SODIUM PHOSPHATE 4 MG/ML
INJECTION, SOLUTION INTRA-ARTICULAR; INTRALESIONAL; INTRAMUSCULAR; INTRAVENOUS; SOFT TISSUE PRN
Status: DISCONTINUED | OUTPATIENT
Start: 2022-11-28 | End: 2022-11-28 | Stop reason: ALTCHOICE

## 2022-11-28 RX ORDER — LIDOCAINE HYDROCHLORIDE 10 MG/ML
INJECTION, SOLUTION INFILTRATION; PERINEURAL PRN
Status: DISCONTINUED | OUTPATIENT
Start: 2022-11-28 | End: 2022-11-28 | Stop reason: ALTCHOICE

## 2022-11-28 RX ORDER — SODIUM CHLORIDE 9 MG/ML
INJECTION, SOLUTION INTRAVENOUS CONTINUOUS PRN
Status: DISCONTINUED | OUTPATIENT
Start: 2022-11-28 | End: 2022-11-28 | Stop reason: SDUPTHER

## 2022-11-28 RX ORDER — CYCLOBENZAPRINE HCL 5 MG
TABLET ORAL
Qty: 90 TABLET | Refills: 0 | OUTPATIENT
Start: 2022-11-28

## 2022-11-28 RX ORDER — FENTANYL CITRATE 50 UG/ML
INJECTION, SOLUTION INTRAMUSCULAR; INTRAVENOUS PRN
Status: DISCONTINUED | OUTPATIENT
Start: 2022-11-28 | End: 2022-11-28 | Stop reason: SDUPTHER

## 2022-11-28 RX ORDER — BUPIVACAINE HYDROCHLORIDE 2.5 MG/ML
INJECTION, SOLUTION EPIDURAL; INFILTRATION; INTRACAUDAL PRN
Status: DISCONTINUED | OUTPATIENT
Start: 2022-11-28 | End: 2022-11-28 | Stop reason: ALTCHOICE

## 2022-11-28 RX ADMIN — FENTANYL CITRATE 100 MCG: 50 INJECTION, SOLUTION INTRAMUSCULAR; INTRAVENOUS at 14:42

## 2022-11-28 RX ADMIN — SODIUM CHLORIDE: 9 INJECTION, SOLUTION INTRAVENOUS at 14:36

## 2022-11-28 ASSESSMENT — PAIN SCALES - GENERAL: PAINLEVEL_OUTOF10: 0

## 2022-11-28 ASSESSMENT — PAIN - FUNCTIONAL ASSESSMENT: PAIN_FUNCTIONAL_ASSESSMENT: 0-10

## 2022-11-28 NOTE — H&P
[address] [address]  History and Physical Update    Pt Name: Charo Jung  MRN: [id number]  YOB: 1970  Date of evaluation: 11/28/2022      I have examined the patient and reviewed the H&P/Consult and there are no changes to the patient or plans.         Electronically signed by Luis Rosenberg MD on 11/28/2022 at 2:05 PM

## 2022-11-28 NOTE — ANESTHESIA PRE PROCEDURE
Department of Anesthesiology  Preprocedure Note       Name:  Orlando Diehl   Age:  47 y.o.  :  1970                                          MRN:  [id number]         Date:  2022      Surgeon: Jaime Sultana):  Diana Macias MD    Procedure: Procedure(s):  Bilateral Transforaminal Lumbar Epidural Steroid Injection Lumbar 5-Sacral 1    Medications prior to admission:   Prior to Admission medications    Medication Sig Start Date End Date Taking? Authorizing Provider   cyclobenzaprine (FLEXERIL) 5 MG tablet Take 1 tablet by mouth 3 times daily as needed for Muscle spasms 22  ANGELINA Benoit - CNP   mirabegron (MYRBETRIQ) 50 MG TB24 Take 50 mg by mouth daily 10/24/22   ANGELINA Simpson CNP   gabapentin (NEURONTIN) 300 MG capsule Take 1 capsule by mouth 3 times daily for 30 days. 10/17/22 11/28/22  ANGELINA Benoit - CNP   etodolac (LODINE) 400 MG tablet TAKE 1 TABLET BY MOUTH THREE TIMES A DAY 9/19/22 10/19/22  ANGELINA Benoit - CNP   mirabegron (MYRBETRIQ) 50 MG TB24 Take 50 mg by mouth daily 22   Sotero Valentine MD   solifenacin (VESICARE) 10 MG tablet Take 1 tablet by mouth daily 22  ANGELINA Wagner - CNP   sildenafil (VIAGRA) 100 MG tablet Take 1 tablet by mouth daily as needed for Erectile Dysfunction 22   Sotero Valentine MD   DULoxetine (CYMBALTA) 60 MG extended release capsule TAKE 1 CAPSULE BY MOUTH EVERY DAY 22   Historical Provider, MD   DULoxetine (CYMBALTA) 30 MG extended release capsule 2 times daily Pt takes 60mg in am and 30mg in the pm 21   Historical Provider, MD   atorvastatin (LIPITOR) 20 MG tablet Take 20 mg by mouth daily    Historical Provider, MD   traZODone (DESYREL) 50 MG tablet Take 50 mg by mouth nightly as needed  21   Historical Provider, MD       Current medications:    No current outpatient medications on file. No current facility-administered medications for this visit. Allergies:  No Known Allergies    Problem List:    Patient Active Problem List   Diagnosis Code    Lumbar radiculopathy M54.16    Spinal stenosis of lumbar region without neurogenic claudication M48.061    Lumbar spondylosis M47.816    Hypertrophy of prostate with urinary obstruction N40.1, N13.8    Hematuria R31.9       Past Medical History:        Diagnosis Date    Cerebral artery occlusion with cerebral infarction ([facility] [address]     PT STATES TIA    Chronic back pain     DDD (degenerative disc disease), lumbar     Enlarged prostate     Fibromyalgia     Hyperlipidemia     Hypertension     Kidney stones     Lumbago        Past Surgical History:        Procedure Laterality Date    COLONOSCOPY      LITHOTRIPSY      PAIN MANAGEMENT PROCEDURE N/A 1/5/2021    LESI L5 #1 performed by Luis Rosenberg MD at [address] [place] [address] PAIN MANAGEMENT PROCEDURE N/A 3/26/2021    LESI # 2 @ L4 performed by Luis Rosenberg MD at [facility] Bilateral 5/3/2021    bilateral L-facet MBB # 1 @ L3-4, L4-5 performed by Luis Rosenberg MD at [facility] Bilateral 4/26/2022    bilateral L-facet MBB # 2 @ L3-4, L4-5 performed by Luis Rosenberg MD at [facility] Bilateral 7/5/2022    Bilateral Lumbar Facet Radiofrequency Ablation Lumbar 3-4, 4-5. performed by Luis Rosenberg MD at [address] [place] [address]    TURP N/A 5/26/2021    CYSTOSCOPY TRANSURETHRAL RESECTION PROSTATE performed by Hamzah Wayne MD at [address] [place] [address] TURP N/A 11/10/2021    CYSTOSCOPY TRANSURETHRAL RESECTION PROSTATE TRANSURETHERAL INCISION OF BLADDER NECK CONTRACTURE performed by Hamzah Wayne MD at [facility] History:    Social History     Tobacco Use    Smoking status: Every Day     Packs/day: 0.50     Types: Cigarettes    Smokeless tobacco: Never   Substance Use Topics  Alcohol use: Not Currently                                Ready to quit: Not Answered  Counseling given: Not Answered      Vital Signs (Current): There were no vitals filed for this visit. BP Readings from Last 3 Encounters:   11/28/22 112/75   10/17/22 122/74   09/13/22 122/82       NPO Status:                                                                                 BMI:   Wt Readings from Last 3 Encounters:   11/28/22 193 lb (87.5 kg)   10/17/22 178 lb (80.7 kg)   09/13/22 178 lb (80.7 kg)     There is no height or weight on file to calculate BMI.    CBC:   Lab Results   Component Value Date/Time    WBC 12.9 10/26/2021 11:30 AM    RBC 5.42 10/26/2021 11:30 AM    HGB 16.7 10/26/2021 11:30 AM    HCT 50.4 10/26/2021 11:30 AM    MCV 93.0 10/26/2021 11:30 AM    RDW 12.6 03/10/2021 11:02 AM     10/26/2021 11:30 AM       CMP:   Lab Results   Component Value Date/Time     10/26/2021 11:30 AM    K 4.6 10/26/2021 11:30 AM    K 5.2 05/27/2021 05:59 AM     10/26/2021 11:30 AM    CO2 28 10/26/2021 11:30 AM    BUN 14 10/26/2021 11:30 AM    CREATININE 1.1 10/26/2021 11:30 AM    GFRAA >60 03/10/2021 11:02 AM    LABGLOM 70 10/26/2021 11:30 AM    GLUCOSE 93 10/26/2021 11:30 AM    PROT 6.7 03/10/2021 11:02 AM    CALCIUM 9.4 10/26/2021 11:30 AM    BILITOT 1.04 03/10/2021 11:02 AM    ALKPHOS 62 03/10/2021 11:02 AM    AST 17 03/10/2021 11:02 AM    ALT 13 03/10/2021 11:02 AM       POC Tests: No results for input(s): POCGLU, POCNA, POCK, POCCL, POCBUN, POCHEMO, POCHCT in the last 72 hours.     Coags:   Lab Results   Component Value Date/Time    INR 1.03 10/29/2020 07:05 AM       HCG (If Applicable): No results found for: PREGTESTUR, PREGSERUM, HCG, HCGQUANT     ABGs: No results found for: PHART, PO2ART, ORP9MFM, TRD1OMS, BEART, T0HWNVPX     Type & Screen (If Applicable):  No results found for: LABABO, LABRH    Drug/Infectious Status (If Applicable):  No results found for: HIV, HEPCAB    COVID-19 Screening (If Applicable): No results found for: COVID19        Anesthesia Evaluation  Patient summary reviewed and Nursing notes reviewed no history of anesthetic complications:   Airway: Mallampati: II  TM distance: >3 FB   Neck ROM: full  Mouth opening: > = 3 FB   Dental:          Pulmonary:Negative Pulmonary ROS and normal exam  breath sounds clear to auscultation                             Cardiovascular:  Exercise tolerance: good (>4 METS),   (+) hypertension:,                   Neuro/Psych:   (+) CVA:, neuromuscular disease:,             GI/Hepatic/Renal: Neg GI/Hepatic/Renal ROS            Endo/Other: Negative Endo/Other ROS             Pt had no PAT visit       Abdominal:             Vascular: negative vascular ROS. Other Findings:             Anesthesia Plan      MAC     ASA 3       Induction: intravenous. MIPS: prophylactic pharmacologic antiemetic agents not administered perioperatively for documented reasons. Anesthetic plan and risks discussed with patient.       Plan discussed with surgical team.                    Wyatt Hurt MD   11/28/2022

## 2022-11-28 NOTE — OP NOTE
Pre-Procedure Note    Patient Name: Blake Lawson   YOB: 1970  Medical Record Number: [id number]  Date: 11/28/22       Indication:  BLE pain       Consent: On file. Vital Signs:   Vitals:    11/28/22 1337   BP: 112/75   Pulse: 85   Resp: 16   Temp: 96.8 °F (36 °C)   SpO2: 95%       Past Medical History:   has a past medical history of Cerebral artery occlusion with cerebral infarction ([address] Chronic back pain, DDD (degenerative disc disease), lumbar, Enlarged prostate, Fibromyalgia, Hyperlipidemia, Hypertension, Kidney stones, and Lumbago. Past Surgical History:   has a past surgical history that includes rhinoplasty (2017); Colonoscopy; Lithotripsy; Pain management procedure (N/A, 1/5/2021); Pain management procedure (N/A, 3/26/2021); Pain management procedure (Bilateral, 5/3/2021); TURP (N/A, 5/26/2021); TURP (N/A, 11/10/2021); Pain management procedure (Bilateral, 4/26/2022); and Pain management procedure (Bilateral, 7/5/2022). Pre-Sedation Documentation and Exam:   Vital signs have been reviewed (see flow sheet for vitals). Sedation/ Anesthesia Plan:   MAC    Patient is an appropriate candidate for plan of sedation: yes    Preoperative Diagnosis:  L-spinal stenosis, L-radiculopathy    Post-Op Dx: as above    Procedure Performed:  Transforaminal lumbar epidural steroid injection at the levels of  L5 on the Bilateral side under fluoroscopic guidance . Indication for the Procedure: The patient failed conservative management  for pain in the low back radiating to lower extremities. As the patient is not responding to conservative management and pain is interfering with activities of daily living, we decided to proceed with transforaminal lumbar epidural steroid injection as symptoms are mostly following the nerve root distribution. The procedure and the risks were discussed with the patient and informed consent was obtained.      Procedure:     A meaningful communication was kept up with the patient throughout   the procedure. The patient is placed in prone position. The skin over the back was prepped and draped in sterile manner. Then the skin and deep tissues just above the tip of the transverse process of L-5 vertebra on Bilateral side were infiltrated with about 5 ml of 1% lidocaine. The #22-gauge, 3.5 inch spinal needle was inserted through the skin wheal  and under fluoroscopy guidance was directed such that the tip of the needle lies in the neuroforamina at about the 6 o'clock position under the pedicle of the L-5 vertebra. This was confirmed with AP and lateral views of the fluoroscopy. Then after negative aspiration a total of 1 ml of Omnipaque-180 was injected through the needle and spread of the contrast in the epidural space as well as along the nerve root was observed. Then after negative aspiration a total of 12 mg of Dexamethasone and 4 ml of 0.25%Marcaine MPF was injected through the needle in divided doses. The needle is removed and a Band-Aid was placed over the needle  insertion site. EBL-0  The patient's vital signs remained stable and the patient tolerated the procedure well. The patient was discharged home in stable condition and will be followed in the pain clinic in the next few weeks for further planning.       Electronically signed by Mojgan King MD on 11/28/22 at 2:36 PM EST

## 2022-11-28 NOTE — PROGRESS NOTES
1453 Patient arrived to phase II via cart. Spontaneous respiraitons even and unlabored. Placed on monitor--VSS. Report received from [facility] Assessment completed. Patient is alert and oriented x4. IV infusing- no complications. Patient denies pain--will monitor. Injection sites clean and dry. 1457 Snack and drink provided. Pt. Denies all other needs at this time. Side rails up X2. Call light left within reach. Abdulaziz at bedside. Pt. Ambulated to  in stable condition. 1518 PT. Returned to room and denies needs. [address] at bedside. Pt. States readiness to be discharged. 1542 INT removed. No complications noted. 1543 Pt. Getting self dressed. Family notified of discharge. 1546 Pt. Taken to private vehicle in stable condition via wheelchair.

## 2022-11-28 NOTE — ANESTHESIA POSTPROCEDURE EVALUATION
Department of Anesthesiology  Postprocedure Note    Patient: Luis F Johnson  MRN: [id number]  YOB: 1970  Date of evaluation: 11/28/2022      Procedure Summary     Date: 11/28/22 Room / Location: [facility] OR 03 / [facility]    Anesthesia Start: 9379 Anesthesia Stop: 4257    Procedure: Bilateral Transforaminal Lumbar Epidural Steroid Injection Lumbar 5-Sacral 1 (Bilateral) Diagnosis:       Spondylosis of lumbar region without myelopathy or radiculopathy      (Spondylosis of lumbar region without myelopathy or radiculopathy)    Surgeons: Mojgan King MD Responsible Provider: Deangelo Burks MD    Anesthesia Type: MAC ASA Status: 3          Anesthesia Type: MAC    Gerard Phase I:      Gerard Phase II: Gerard Score: 10      Anesthesia Post Evaluation    Patient location during evaluation: bedside  Patient participation: complete - patient participated  Level of consciousness: awake  Airway patency: patent  Nausea & Vomiting: no vomiting and no nausea  Complications: no  Cardiovascular status: hemodynamically stable  Respiratory status: acceptable  Hydration status: stable

## 2022-11-28 NOTE — H&P
H&P    Patient states complaint is pain down bilateral legs- numbness tingling burning pain from low back down to toes. Gets increased pain- ripping pain especially when he stretches his legs out. Has some pain in low back but remains well controlled from L-facet RFA and he reports that he has been wearing his back brace prn. Pain increases with bending, lifting, twisting , walking, and housework or working at job, steps, walking his dog. Radiology:  Lumbar MRI:   FINDINGS:   There is anatomic vertebral body height and alignment. Marrow signal is within normal limits. The conus terminates in a normal fashion at the L1 level. The cauda equina is normal in appearance without nerve root thickening or clumping identified. Paraspinal soft tissues are unremarkable. At T12-L1 through L2-3 the intervertebral discs are normal in appearance. There is no significant spinal canal or neuroforaminal stenosis. At L3-4 there is a minimal disc bulge without significant spinal canal stenosis and mild bilateral frontal stenosis in association with mild facet hypertrophy and ligamentum flavum thickening. At L4-5 there is a minimal disc bulge without significant spinal canal stenosis and mild bilateral frontal stenosis in association with mild facet hypertrophy and ligament flavum thickening. L5-S1 there is a shallow disc bulge with linear hyperintense T2 signal periphery as evidence for an annular fissure. The disc mildly indents thecal sac and may contact traversing S1 nerve roots bilaterally. There is mild bilateral neural foraminal    stenosis in association with mild facet hypertrophy and ligamentum flavum thickening. Impression    Overall mild degenerative changes of the lumbar spine most pronounced at L5-S1 where there is a shallow disc bulge with annular fissure which mildly indents thecal sac and may contact traversing S1 nerve roots bilaterally.           Continues Neurontin, Etodalac and THC which helps  States flexeril prn helps spasms which he gets in legs   Medications reviewed. Patient denies side effects with medications. Patient states he is taking medications as prescribed. Hedenies receiving pain medications from other sources. He denies any ER visits since last visit. Pain scale with out pain medications or at its worst is 3-4/10. Increases to 8-9/10   Last dose of Neurontin was today      The patienthas No Known Allergies. Subjective:      Review of Systems   Constitutional: Negative. Genitourinary:  Negative for difficulty urinating. Musculoskeletal:  Positive for arthralgias, back pain, gait problem, joint swelling and myalgias. Negative for neck pain and neck stiffness. No assist devices    Skin: Negative. Neurological:  Positive for weakness and numbness. Psychiatric/Behavioral:  Positive for sleep disturbance. The patient is not nervous/anxious. Objective:      Vitals       Vitals:     10/17/22 0914   BP: 122/74   Weight: 178 lb (80.7 kg)   Height: 5' 11\" (1.803 m)            Physical Exam  Vitals reviewed. Constitutional:       General: He is not in acute distress. Appearance: Normal appearance. He is well-developed. He is not diaphoretic. HENT:      Head: Normocephalic and atraumatic. Not macrocephalic and not microcephalic. Right Ear: External ear normal.      Left Ear: External ear normal.      Mouth/Throat:      Mouth: Mucous membranes are moist.   Eyes:      General:         Right eye: No discharge. Left eye: No discharge. Conjunctiva/sclera: Conjunctivae normal.   Neck:      Trachea: No tracheal deviation. Cardiovascular:      Rate and Rhythm: Normal rate and regular rhythm. Pulses: Normal pulses. Heart sounds: Normal heart sounds. Pulmonary:      Effort: Pulmonary effort is normal. No respiratory distress. Abdominal:      General: Abdomen is flat. Palpations: Abdomen is soft.    Musculoskeletal: General: Tenderness present. Cervical back: Rigidity, tenderness and bony tenderness present. Pain with movement and muscular tenderness present. Decreased range of motion. Lumbar back: Spasms, tenderness and bony tenderness present. Decreased range of motion. Positive right straight leg raise test and positive left straight leg raise test.        Back:       Right upper leg: Tenderness and bony tenderness present. Left upper leg: Tenderness and bony tenderness present. Right lower leg: Tenderness and bony tenderness present. Left lower leg: Tenderness and bony tenderness present. Legs:    Skin:     General: Skin is warm and dry. Coloration: Skin is not pale. Findings: No rash. Neurological:      Mental Status: He is alert and oriented to person, place, and time. Cranial Nerves: No cranial nerve deficit. Sensory: Sensory deficit present. Motor: Weakness present. Coordination: Coordination abnormal.      Gait: Gait abnormal.      Comments: 4/5 strength left lower extremity, 3-4 right lower     Psychiatric:         Attention and Perception: He is attentive. Speech: Speech normal.         Behavior: Behavior normal.         Thought Content: Thought content normal.         Judgment: Judgment normal.      GUMARO  Patricks test  positive  Yeoman's  or Gaenslen's positive        Assessment:      1. Lumbosacral radiculitis    2. Lumbar foraminal stenosis    3. Annular tear of lumbar disc    4. Pain in both lower extremities    5. Lumbar stenosis with neurogenic claudication    6. Spondylosis of lumbar region without myelopathy or radiculopathy    7. Facet arthropathy of spine    8. Lumbar pain    9. Chronic pain syndrome    10. Neck pain       Plan:      OARRS reviewed. Current MED: 0  Patient was offered naloxone for home. Discussed long term side effects of medications, tolerance, dependency and addiction.   Previous UDS reviewed  UDS preformed today for compliance. Patient told can not receive any pain medications from any other source. No evidence of abuse, diversion or aberrant behavior. Medications and/or procedures to improve function and quality of life- patient understanding with this and that may not be pain free  Discussed with patient about safe storage of medications at home  Discussed possible weaning of medication dosing dependent on treatment/procedure results. Discussed with patient about risks with procedure including infection, reaction to medication, increased pain, or bleeding. Procedure notes reviewed in detail   Continues to receive over 80% relief of low back pain from L-facet RFA   Main complaint remains radicular pain- previous EMG normal. Very short term relief from LESI X 2 in past.  Reviewed Lumbar MRI, reviewed neck xray  Plan Bilateral TFLESI # 1 @ L5-S1. Procedure discussed. Continue Neurontin 300 mg in am and afternoon and 600 at bedtime- ordered refill  Continue flexeril 5 mg TID prn - ordered 10/13/2022  Continue Etodalac prn- has plenty- Will need to hold 5 days prior to procedure   Uses THC. Return for Bilateral TFLESI # 1 @ L5-S1. , follow up after procedure.

## 2022-12-12 ENCOUNTER — OFFICE VISIT (OUTPATIENT)
Dept: PHYSICAL MEDICINE AND REHAB | Age: 52
End: 2022-12-12
Payer: MEDICAID

## 2022-12-12 VITALS
WEIGHT: 193 LBS | SYSTOLIC BLOOD PRESSURE: 134 MMHG | BODY MASS INDEX: 27.02 KG/M2 | HEIGHT: 71 IN | DIASTOLIC BLOOD PRESSURE: 80 MMHG

## 2022-12-12 DIAGNOSIS — M51.36 ANNULAR TEAR OF LUMBAR DISC: ICD-10-CM

## 2022-12-12 DIAGNOSIS — M79.604 PAIN IN BOTH LOWER EXTREMITIES: ICD-10-CM

## 2022-12-12 DIAGNOSIS — M47.819 FACET ARTHROPATHY OF SPINE: ICD-10-CM

## 2022-12-12 DIAGNOSIS — M47.816 LUMBAR SPONDYLOSIS: ICD-10-CM

## 2022-12-12 DIAGNOSIS — M54.50 LUMBAR PAIN: Primary | ICD-10-CM

## 2022-12-12 DIAGNOSIS — M48.061 LUMBAR FORAMINAL STENOSIS: ICD-10-CM

## 2022-12-12 DIAGNOSIS — G89.4 CHRONIC PAIN SYNDROME: ICD-10-CM

## 2022-12-12 DIAGNOSIS — M79.605 PAIN IN BOTH LOWER EXTREMITIES: ICD-10-CM

## 2022-12-12 DIAGNOSIS — M54.2 NECK PAIN: ICD-10-CM

## 2022-12-12 DIAGNOSIS — M48.062 LUMBAR STENOSIS WITH NEUROGENIC CLAUDICATION: ICD-10-CM

## 2022-12-12 DIAGNOSIS — M54.17 LUMBOSACRAL RADICULITIS: ICD-10-CM

## 2022-12-12 PROCEDURE — 4004F PT TOBACCO SCREEN RCVD TLK: CPT | Performed by: NURSE PRACTITIONER

## 2022-12-12 PROCEDURE — G8427 DOCREV CUR MEDS BY ELIG CLIN: HCPCS | Performed by: NURSE PRACTITIONER

## 2022-12-12 PROCEDURE — G8419 CALC BMI OUT NRM PARAM NOF/U: HCPCS | Performed by: NURSE PRACTITIONER

## 2022-12-12 PROCEDURE — G8484 FLU IMMUNIZE NO ADMIN: HCPCS | Performed by: NURSE PRACTITIONER

## 2022-12-12 PROCEDURE — 99214 OFFICE O/P EST MOD 30 MIN: CPT | Performed by: NURSE PRACTITIONER

## 2022-12-12 PROCEDURE — 3017F COLORECTAL CA SCREEN DOC REV: CPT | Performed by: NURSE PRACTITIONER

## 2022-12-12 RX ORDER — CYCLOBENZAPRINE HCL 5 MG
5 TABLET ORAL 3 TIMES DAILY PRN
Qty: 90 TABLET | Refills: 0 | Status: SHIPPED | OUTPATIENT
Start: 2022-12-12 | End: 2023-01-11

## 2022-12-12 RX ORDER — GABAPENTIN 300 MG/1
300 CAPSULE ORAL 3 TIMES DAILY
Qty: 90 CAPSULE | Refills: 0 | Status: SHIPPED | OUTPATIENT
Start: 2022-12-12 | End: 2023-01-11

## 2022-12-12 RX ORDER — ETODOLAC 400 MG/1
TABLET, FILM COATED ORAL
Qty: 90 TABLET | Refills: 0 | Status: SHIPPED | OUTPATIENT
Start: 2022-12-12 | End: 2023-01-11

## 2022-12-12 ASSESSMENT — ENCOUNTER SYMPTOMS
GASTROINTESTINAL NEGATIVE: 1
BACK PAIN: 1
RESPIRATORY NEGATIVE: 1

## 2022-12-12 NOTE — PROGRESS NOTES
[address]  Dept: [phone]  Dept Fax: [phone]: [phone]    Visit Date: 12/12/2022    Functionality Assessment/Goals Worksheet     On a scale of 0 (Does not Interfere) to 10 (Completely Interferes)     1. Which number describes how during the past week pain has interfered with       the following:  A. General Activity:  3  B. Mood: 4  C. Walking Ability:  7  D. Normal Work (Includes both work outside the home and housework):  6  E. Relations with Other People:   5  F. Sleep:   5  G. Enjoyment of Life:   5    2. Patient Prefers to Take their Pain Medications:     [x]  On a regular basis   []  Only when necessary    []  Does not take pain medications    3. What are the Patient's Goals/Expectations for Visiting Pain Management? [x]  Learn about my pain    [x]  Receive Medication   []  Physical Therapy     []  Treat Depression   []  Receive Injections    []  Treat Sleep   []  Deal with Anxiety and Stress   []  Treat Opoid Dependence/Addiction   []  Other:      HPI:   Yao Looney is a 47 y.o. male is here today for    Chief Complaint: Leg pain, low back pain     Wife present     HPI   FU from bilateral TFLESI 11/28/2022. Patient reports that he is receiving 85% relief of of low back and leg pain from this TFLESI and also the L-facet RFA. States that pain is much better. States able to do a lot more with less pain. \"I am not sitting around like I was. Still some pain in low back pinching pain. Still some numbness and tingling mainly in feet. Overall pain is tolerable. Having complaints of neck pain posterior intermittent none now   Pain increases with bending, lifting, and twisting , and rainy cold weather       Continues Neurontin, Etodalac and THC which helps also takes flexeril prn     Medications reviewed.  Patient denies side effects with medications. Patient states he is taking medications as prescribed. Hedenies receiving pain medications from other sources. He denies any ER visits since last visit. Pain scale with out pain medications or at its worst is 3/10. Last dose of Neurontin was today       The patienthas No Known Allergies. Subjective:      Review of Systems   Constitutional: Negative. Respiratory: Negative.          + tobacco use    Cardiovascular: Negative. Gastrointestinal: Negative. Genitourinary:  Negative for difficulty urinating. Musculoskeletal:  Positive for arthralgias, back pain, gait problem, joint swelling, myalgias and neck stiffness. Negative for neck pain. No assist devices    Skin: Negative. Neurological:  Positive for weakness and numbness. Psychiatric/Behavioral:  Positive for sleep disturbance. The patient is not nervous/anxious. Objective:     Vitals:    12/12/22 0839   BP: 134/80   Weight: 193 lb (87.5 kg)   Height: 5' 11\" (1.803 m)       Physical Exam  Vitals reviewed. Constitutional:       General: He is not in acute distress. Appearance: Normal appearance. He is well-developed. He is not diaphoretic. HENT:      Head: Normocephalic and atraumatic. Not macrocephalic and not microcephalic. Right Ear: External ear normal.      Left Ear: External ear normal.      Mouth/Throat:      Mouth: Mucous membranes are moist.   Eyes:      General:         Right eye: No discharge. Left eye: No discharge. Conjunctiva/sclera: Conjunctivae normal.   Neck:      Trachea: No tracheal deviation. Cardiovascular:      Rate and Rhythm: Normal rate and regular rhythm. Pulses: Normal pulses. Heart sounds: Normal heart sounds. Pulmonary:      Effort: Pulmonary effort is normal. No respiratory distress. Abdominal:      General: Abdomen is flat. Palpations: Abdomen is soft. Musculoskeletal:         General: Tenderness present.       Cervical back: Rigidity, tenderness and bony tenderness present. Pain with movement and muscular tenderness present. Decreased range of motion. Lumbar back: Spasms, tenderness and bony tenderness present. Decreased range of motion. Positive right straight leg raise test and positive left straight leg raise test.        Back:       Right upper leg: Tenderness and bony tenderness present. Left upper leg: Tenderness and bony tenderness present. Right lower leg: Tenderness and bony tenderness present. Left lower leg: Tenderness and bony tenderness present. Skin:     General: Skin is warm and dry. Coloration: Skin is not pale. Findings: No rash. Neurological:      Mental Status: He is alert and oriented to person, place, and time. Cranial Nerves: No cranial nerve deficit. Sensory: Sensory deficit present. Motor: Weakness present. Coordination: Coordination abnormal.      Gait: Gait abnormal.      Comments: 4/5 strength left lower extremity, 3-4 right lower     Psychiatric:         Attention and Perception: He is attentive. Speech: Speech normal.         Behavior: Behavior normal.         Thought Content: Thought content normal.         Judgment: Judgment normal.     GUMARO  Patricks test  positive  Yeoman's  or Gaenslen' s positive       Assessment:     1. Lumbar pain    2. Lumbar spondylosis    3. Facet arthropathy of spine    4. Lumbosacral radiculitis    5. Lumbar foraminal stenosis    6. Annular tear of lumbar disc    7. Pain in both lower extremities    8. Lumbar stenosis with neurogenic claudication    9. Neck pain    10. Chronic pain syndrome            Plan:      OARRS reviewed. Current MED: 0  Patient was not offered naloxone for home. Discussed long term side effects of medications, tolerance, dependency and addiction. Previous UDS reviewed  UDS preformed today for compliance. Patient told can not receive any pain medications from any other source.   No evidence of abuse, diversion or aberrant behavior. Medications and/or procedures to improve function and quality of life- patient understanding with this and that may not be pain free  Discussed with patient about safe storage of medications at home  Discussed possible weaning of medication dosing dependent on treatment/procedure results. Discussed with patient about risks with procedure including infection, reaction to medication, increased pain, or bleeding. Procedure notes reviewed in detail   Continues to receive over 80% relief of low back pain from L-facet RFA. Receiving over 85% relief of leg pain from TFLESI. Dicussed repeating injections in future when needing to. Reviewed neck xray again   Continue Neurontin 300 mg in am and afternoon and 600 at bedtime- ordered refill  Continue flexeril 5 mg TID prn - ordered  refill   Continue Etodalac prn- ordered refill   Discussed physical therapy. He wants to think about it. Uses THC. Meds. Prescribed:   Orders Placed This Encounter   Medications    gabapentin (NEURONTIN) 300 MG capsule     Sig: Take 1 capsule by mouth 3 times daily for 30 days. Dispense:  90 capsule     Refill:  0    cyclobenzaprine (FLEXERIL) 5 MG tablet     Sig: Take 1 tablet by mouth 3 times daily as needed for Muscle spasms     Dispense:  90 tablet     Refill:  0    etodolac (LODINE) 400 MG tablet     Sig: TAKE 1 TABLET BY MOUTH THREE TIMES A DAY     Dispense:  90 tablet     Refill:  0     NEEDS NEW PRESCRIPTION         Return in about 3 months (around 3/12/2023), or if symptoms worsen or fail to improve, for follow up  for medications.                Electronically signed by ANGELINA Garcia CNP on12/12/2022 at 9:06 AM

## 2022-12-12 NOTE — TELEPHONE ENCOUNTER
[employer] [facility] called requesting a refill on the following medications:  Requested Prescriptions     Pending Prescriptions Disp Refills    etodolac (LODINE) 400 MG tablet 90 tablet 0     Sig: TAKE 1 TABLET BY MOUTH THREE TIMES A DAY     Pharmacy verified:[facility]  . [email or web address]      Date of last visit:   Date of next visit (if applicable): 2/10/3930

## 2022-12-13 RX ORDER — ETODOLAC 400 MG/1
TABLET, FILM COATED ORAL
Qty: 90 TABLET | Refills: 0 | OUTPATIENT
Start: 2022-12-13 | End: 2023-01-11

## 2022-12-13 NOTE — TELEPHONE ENCOUNTER
OARRS reviewed. UDS: No screen   Last seen: 12/12/2022.  Follow-up:   Future Appointments   Date Time Provider Pooja Jimenezi   3/13/2023  9:00 AM ANGELINA eFliz - CNP N [facility] Pain [facility] [place] [address] [address]   3/15/2023 10:45 AM MD Jocelin Kaplan [facility] [place]  [place]   5/2/2023 10:00 AM [facility] Arsen Chavez

## 2023-01-11 RX ORDER — SOLIFENACIN SUCCINATE 10 MG/1
10 TABLET, FILM COATED ORAL DAILY
Qty: 30 TABLET | Refills: 5 | Status: SHIPPED | OUTPATIENT
Start: 2023-01-11 | End: 2024-01-11

## 2023-01-11 NOTE — TELEPHONE ENCOUNTER
Zen Plannerqueline Solders called requesting a refill on the following medications:  Requested Prescriptions     Pending Prescriptions Disp Refills    mirabegron (MYRBETRIQ) 50 MG TB24 28 tablet 0     Sig: Take 50 mg by mouth daily     Pharmacy verified:  [email or web address]      Date of last visit:   Date of next visit (if applicable): 6/12/0982

## 2023-01-11 NOTE — TELEPHONE ENCOUNTER
Marry Morris called requesting a refill on the following medications:  Requested Prescriptions     Pending Prescriptions Disp Refills    solifenacin (VESICARE) 10 MG tablet 30 tablet 5     Sig: Take 1 tablet by mouth daily     Pharmacy verified:  [email or web address]      Date of last visit: 09/07/2022  Date of next visit (if applicable): 1/27/0671

## 2023-01-11 NOTE — TELEPHONE ENCOUNTER
Itzel Pastor called requesting a refill on the following medications:  Requested Prescriptions     Pending Prescriptions Disp Refills    mirabegron (MYRBETRIQ) 50 MG TB24 28 tablet 0     Sig: Take 50 mg by mouth daily     Pharmacy verified:  [email or web address]      Date of last visit: 09/07/2022  Date of next visit (if applicable): 3/64/2931

## 2023-01-12 RX ORDER — CYCLOBENZAPRINE HCL 5 MG
5 TABLET ORAL 3 TIMES DAILY PRN
Qty: 90 TABLET | Refills: 0 | Status: SHIPPED | OUTPATIENT
Start: 2023-01-12 | End: 2023-02-08

## 2023-01-12 RX ORDER — CYCLOBENZAPRINE HCL 5 MG
5 TABLET ORAL 3 TIMES DAILY PRN
Qty: 90 TABLET | Refills: 0 | Status: SHIPPED | OUTPATIENT
Start: 2023-01-12 | End: 2023-02-11

## 2023-01-12 RX ORDER — ETODOLAC 400 MG/1
TABLET, FILM COATED ORAL
Qty: 90 TABLET | Refills: 0 | Status: SHIPPED | OUTPATIENT
Start: 2023-01-12 | End: 2023-02-11

## 2023-01-12 RX ORDER — ETODOLAC 400 MG/1
TABLET, FILM COATED ORAL
Qty: 90 TABLET | Refills: 0 | Status: SHIPPED | OUTPATIENT
Start: 2023-01-12 | End: 2023-02-08

## 2023-01-12 NOTE — TELEPHONE ENCOUNTER
OARRS reviewed. UDS:no screen. Last seen: 12/12/2022.  Follow-up:   Future Appointments   Date Time Provider Pooja Chicas   3/13/2023  9:00 AM ANGELINA Benoit - CNP N [facility] Pain [place]  [place]   3/15/2023 10:45 AM MD Boo Fields [place] [facility] [place]  [place] [address]   5/2/2023 10:00 AM [facility] Akanksha Lopez

## 2023-02-06 NOTE — TELEPHONE ENCOUNTER
Colleen George called requesting a refill on the following medications:  Requested Prescriptions     Pending Prescriptions Disp Refills    MYRBETRIQ 50 MG TB24 [Pharmacy Med Name: Velva Carrel ER 50 MG TABLET] 28 tablet 0     Sig: TAKE 50 MG BY MOUTH DAILY     Pharmacy verified:  [email or web address]      Date of last visit: 09/07/2022  Date of next visit (if applicable): 6/09/3160

## 2023-02-07 RX ORDER — GABAPENTIN 300 MG/1
300 CAPSULE ORAL 3 TIMES DAILY
Qty: 90 CAPSULE | Refills: 0 | Status: SHIPPED | OUTPATIENT
Start: 2023-02-10 | End: 2023-03-12

## 2023-02-08 RX ORDER — ETODOLAC 400 MG/1
TABLET, FILM COATED ORAL
Qty: 90 TABLET | Refills: 3 | Status: SHIPPED | OUTPATIENT
Start: 2023-02-11 | End: 2023-03-13

## 2023-02-08 RX ORDER — CYCLOBENZAPRINE HCL 5 MG
5 TABLET ORAL 3 TIMES DAILY PRN
Qty: 90 TABLET | Refills: 3 | Status: SHIPPED | OUTPATIENT
Start: 2023-02-11 | End: 2023-03-13

## 2023-02-08 NOTE — TELEPHONE ENCOUNTER
OARRS reviewed. UDS:no screen  Last seen:12/12/2022.  Follow-up:   Future Appointments   Date Time Provider [facility]   3/13/2023  9:00 AM ANGELINA Cherry - CNP N [facility] Pain [place]  [place] [address]   3/15/2023 10:45 AM Walker Buerger, MD Judah Schlichter [facility] [place]  [place] [address]   5/2/2023 10:00 AM [facility] Anjelica Carlson

## 2023-02-10 RX ORDER — MIRABEGRON 50 MG/1
TABLET, FILM COATED, EXTENDED RELEASE ORAL
Qty: 28 TABLET | Refills: 1 | Status: SHIPPED | OUTPATIENT
Start: 2023-02-10

## 2023-03-03 RX ORDER — CYCLOBENZAPRINE HCL 5 MG
5 TABLET ORAL 3 TIMES DAILY PRN
Qty: 90 TABLET | Refills: 3 | OUTPATIENT
Start: 2023-03-03 | End: 2023-04-02

## 2023-03-03 RX ORDER — GABAPENTIN 300 MG/1
300 CAPSULE ORAL 3 TIMES DAILY
Qty: 90 CAPSULE | Refills: 0 | Status: SHIPPED | OUTPATIENT
Start: 2023-03-09 | End: 2023-04-08

## 2023-03-03 RX ORDER — SOLIFENACIN SUCCINATE 10 MG/1
10 TABLET, FILM COATED ORAL DAILY
Qty: 30 TABLET | Refills: 5 | OUTPATIENT
Start: 2023-03-03 | End: 2024-03-02

## 2023-03-03 RX ORDER — ETODOLAC 400 MG/1
TABLET, FILM COATED ORAL
Qty: 90 TABLET | Refills: 3 | OUTPATIENT
Start: 2023-03-03 | End: 2023-04-01

## 2023-03-03 NOTE — TELEPHONE ENCOUNTER
OARRS reviewed. UDS: negative. Last seen: 12/12/2022.  Follow-up:   Future Appointments   Date Time Provider Pooja Chicas   3/13/2023  9:00 AM ANGELINA Smith - CNP N [facility] Pain [place]  [place] [address]   3/15/2023 10:45 AM MD Kenna Claudio [facility] [place]  [place] [address]   5/2/2023 10:00 AM [facility]

## 2023-03-13 ENCOUNTER — OFFICE VISIT (OUTPATIENT)
Dept: PHYSICAL MEDICINE AND REHAB | Age: 53
End: 2023-03-13
Payer: MEDICAID

## 2023-03-13 VITALS
DIASTOLIC BLOOD PRESSURE: 80 MMHG | BODY MASS INDEX: 27.02 KG/M2 | HEIGHT: 71 IN | WEIGHT: 193 LBS | SYSTOLIC BLOOD PRESSURE: 130 MMHG

## 2023-03-13 DIAGNOSIS — M48.062 LUMBAR STENOSIS WITH NEUROGENIC CLAUDICATION: ICD-10-CM

## 2023-03-13 DIAGNOSIS — M79.604 PAIN IN BOTH LOWER EXTREMITIES: ICD-10-CM

## 2023-03-13 DIAGNOSIS — M51.36 ANNULAR TEAR OF LUMBAR DISC: ICD-10-CM

## 2023-03-13 DIAGNOSIS — M48.061 LUMBAR FORAMINAL STENOSIS: ICD-10-CM

## 2023-03-13 DIAGNOSIS — G89.4 CHRONIC PAIN SYNDROME: ICD-10-CM

## 2023-03-13 DIAGNOSIS — M54.17 LUMBOSACRAL RADICULITIS: ICD-10-CM

## 2023-03-13 DIAGNOSIS — M54.2 NECK PAIN: ICD-10-CM

## 2023-03-13 DIAGNOSIS — M47.819 FACET ARTHROPATHY OF SPINE: ICD-10-CM

## 2023-03-13 DIAGNOSIS — M79.605 PAIN IN BOTH LOWER EXTREMITIES: ICD-10-CM

## 2023-03-13 DIAGNOSIS — M47.816 LUMBAR SPONDYLOSIS: Primary | ICD-10-CM

## 2023-03-13 DIAGNOSIS — M54.50 LUMBAR PAIN: ICD-10-CM

## 2023-03-13 PROCEDURE — 99214 OFFICE O/P EST MOD 30 MIN: CPT | Performed by: NURSE PRACTITIONER

## 2023-03-13 PROCEDURE — G8419 CALC BMI OUT NRM PARAM NOF/U: HCPCS | Performed by: NURSE PRACTITIONER

## 2023-03-13 PROCEDURE — 3017F COLORECTAL CA SCREEN DOC REV: CPT | Performed by: NURSE PRACTITIONER

## 2023-03-13 PROCEDURE — G8484 FLU IMMUNIZE NO ADMIN: HCPCS | Performed by: NURSE PRACTITIONER

## 2023-03-13 PROCEDURE — 4004F PT TOBACCO SCREEN RCVD TLK: CPT | Performed by: NURSE PRACTITIONER

## 2023-03-13 PROCEDURE — G8427 DOCREV CUR MEDS BY ELIG CLIN: HCPCS | Performed by: NURSE PRACTITIONER

## 2023-03-13 ASSESSMENT — ENCOUNTER SYMPTOMS
GASTROINTESTINAL NEGATIVE: 1
RESPIRATORY NEGATIVE: 1
BACK PAIN: 1

## 2023-03-13 NOTE — PROGRESS NOTES
[facility] [address]  Dept: [phone]  Dept Fax: [phone]: [phone]    Visit Date: 3/13/2023    Functionality Assessment/Goals Worksheet     On a scale of 0 (Does not Interfere) to 10 (Completely Interferes)     1. Which number describes how during the past week pain has interfered with       the following:  A. General Activity:  8  B. Mood: 8  C. Walking Ability:  7  D. Normal Work (Includes both work outside the home and housework):  7  E. Relations with Other People:   9  F. Sleep:   7  G. Enjoyment of Life:   7    2. Patient Prefers to Take their Pain Medications:     []  On a regular basis   [x]  Only when necessary    []  Does not take pain medications    3. What are the Patient's Goals/Expectations for Visiting Pain Management? []  Learn about my pain    [x]  Receive Medication   []  Physical Therapy     []  Treat Depression   [x]  Receive Injections    []  Treat Sleep   []  Deal with Anxiety and Stress   []  Treat Opoid Dependence/Addiction   []  Other:      HPI:   Leigh Painter is a 47 y.o. male is here today for    Chief Complaint: Low back pain, leg pain     Wife present     HPI   3 month FU. Continues to have pain in low back- aching, pinching and pressure and numbness and stretching pain down legs to toes. States pain has been manageable and tolerable with procedures and medications. Continues to receive relief from L-facet RFA, TFLSI, and neuronin helps. Pain increases with Pain increases with bending, lifting, and twisting , and rainy cold weather, walking a lot, worse in morning     Continues Neurontin, Etodalac and THC which helps also takes flexeril prn       Prior Injections:  bilateral TFLESI 11/28/2022      Medications reviewed. Patient denies side effects with medications. Patient states he is taking medications as prescribed. Chari receiving pain medications from other sources. He denies any ER visits since last visit. Pain scale with out pain medications or at its worst is 5-7/10. Last dose of Neurontin was today         The patienthas No Known Allergies. Subjective:      Review of Systems   Constitutional: Negative. Respiratory: Negative.          + tobacco use    Cardiovascular: Negative. Gastrointestinal: Negative. Genitourinary:  Negative for difficulty urinating. Musculoskeletal:  Positive for arthralgias, back pain, gait problem, joint swelling, myalgias and neck stiffness. Negative for neck pain. No assist devices    Skin: Negative. Neurological:  Positive for weakness and numbness. Psychiatric/Behavioral:  Positive for sleep disturbance. The patient is not nervous/anxious. Objective:     Vitals:    03/13/23 0858   BP: 130/80   Weight: 193 lb (87.5 kg)   Height: 5' 11\" (1.803 m)       Physical Exam  Vitals reviewed. Constitutional:       General: He is not in acute distress. Appearance: Normal appearance. He is well-developed. He is not diaphoretic. HENT:      Head: Normocephalic and atraumatic. Not macrocephalic and not microcephalic. Right Ear: External ear normal.      Left Ear: External ear normal.      Mouth/Throat:      Mouth: Mucous membranes are moist.   Eyes:      General:         Right eye: No discharge. Left eye: No discharge. Conjunctiva/sclera: Conjunctivae normal.   Neck:      Trachea: No tracheal deviation. Cardiovascular:      Rate and Rhythm: Normal rate and regular rhythm. Pulses: Normal pulses. Heart sounds: Normal heart sounds. Pulmonary:      Effort: Pulmonary effort is normal. No respiratory distress. Breath sounds: Normal breath sounds. Abdominal:      General: Abdomen is flat. Palpations: Abdomen is soft. Musculoskeletal:         General: Tenderness present.       Cervical back: Rigidity, tenderness and bony tenderness present. Pain with movement and muscular tenderness present. Decreased range of motion. Lumbar back: Spasms, tenderness and bony tenderness present. Decreased range of motion. Positive right straight leg raise test and positive left straight leg raise test.        Back:       Right upper leg: Tenderness and bony tenderness present. Left upper leg: Tenderness and bony tenderness present. Right lower leg: Tenderness and bony tenderness present. Left lower leg: Tenderness and bony tenderness present. Skin:     General: Skin is warm and dry. Coloration: Skin is not pale. Findings: No rash. Neurological:      Mental Status: He is alert and oriented to person, place, and time. Cranial Nerves: No cranial nerve deficit. Sensory: Sensory deficit present. Motor: Weakness present. Coordination: Coordination abnormal.      Gait: Gait abnormal.      Comments: 4/5 strength left lower extremity, 3-4 right lower     Psychiatric:         Attention and Perception: He is attentive. Speech: Speech normal.         Behavior: Behavior normal.         Thought Content: Thought content normal.         Judgment: Judgment normal.     GUMARO  Patricks test  positive  Yeoman's  or Gaenslen's positive       Assessment:     1. Lumbar spondylosis    2. Lumbar pain    3. Facet arthropathy of spine    4. Lumbosacral radiculitis    5. Lumbar foraminal stenosis    6. Annular tear of lumbar disc    7. Pain in both lower extremities    8. Lumbar stenosis with neurogenic claudication    9. Neck pain    10. Chronic pain syndrome            Plan:      OARRS reviewed. Current MED: 0  Patient was not offered naloxone for home. Discussed long term side effects of medications, tolerance, dependency and addiction. Previous UDS reviewed  UDS preformed today for compliance. Patient told can not receive any pain medications from any other source.   No evidence of abuse, diversion or aberrant behavior. Medications and/or procedures to improve function and quality of life- patient understanding with this and that may not be pain free  Discussed with patient about safe storage of medications at home  Discussed possible weaning of medication dosing dependent on treatment/procedure results. Discussed with patient about risks with procedure including infection, reaction to medication, increased pain, or bleeding. Procedure notes reviewed in detail   Continues to receive over 80% relief of low back pain from L-facet RFA. Receiving over 80% relief of leg pain from TFLESI. Discussed repeating injections in future when needing to. Continue Neurontin 300 mg in am and afternoon and 600 at bedtime- filled 3/8/2023  Continue flexeril 5 mg TID prn - has plenty   Continue Etodalac prn- has plenty   Discussed physical therapy. Pain remains adequately controlled at this time   Uses THC. Meds. Prescribed:   No orders of the defined types were placed in this encounter. Return in about 3 months (around 6/13/2023), or if symptoms worsen or fail to improve, for follow up  for medications.                Electronically signed by ANGELINA Gómez CNP on3/13/2023 at 9:17 AM

## 2023-03-15 ENCOUNTER — OFFICE VISIT (OUTPATIENT)
Dept: UROLOGY | Age: 53
End: 2023-03-15
Payer: MEDICAID

## 2023-03-15 VITALS — BODY MASS INDEX: 27.02 KG/M2 | RESPIRATION RATE: 18 BRPM | WEIGHT: 193 LBS | HEIGHT: 71 IN

## 2023-03-15 DIAGNOSIS — N32.81 OAB (OVERACTIVE BLADDER): ICD-10-CM

## 2023-03-15 DIAGNOSIS — N13.8 HYPERTROPHY OF PROSTATE WITH URINARY OBSTRUCTION: ICD-10-CM

## 2023-03-15 DIAGNOSIS — N32.0 BLADDER NECK CONTRACTURE: Primary | ICD-10-CM

## 2023-03-15 DIAGNOSIS — N52.9 ERECTILE DYSFUNCTION, UNSPECIFIED ERECTILE DYSFUNCTION TYPE: ICD-10-CM

## 2023-03-15 DIAGNOSIS — N40.1 HYPERTROPHY OF PROSTATE WITH URINARY OBSTRUCTION: ICD-10-CM

## 2023-03-15 LAB — POST VOID RESIDUAL (PVR): 11 ML

## 2023-03-15 PROCEDURE — 3017F COLORECTAL CA SCREEN DOC REV: CPT | Performed by: NURSE PRACTITIONER

## 2023-03-15 PROCEDURE — 51798 US URINE CAPACITY MEASURE: CPT | Performed by: NURSE PRACTITIONER

## 2023-03-15 PROCEDURE — 99214 OFFICE O/P EST MOD 30 MIN: CPT | Performed by: NURSE PRACTITIONER

## 2023-03-15 PROCEDURE — G8427 DOCREV CUR MEDS BY ELIG CLIN: HCPCS | Performed by: NURSE PRACTITIONER

## 2023-03-15 PROCEDURE — 4004F PT TOBACCO SCREEN RCVD TLK: CPT | Performed by: NURSE PRACTITIONER

## 2023-03-15 PROCEDURE — G8419 CALC BMI OUT NRM PARAM NOF/U: HCPCS | Performed by: NURSE PRACTITIONER

## 2023-03-15 PROCEDURE — G8484 FLU IMMUNIZE NO ADMIN: HCPCS | Performed by: NURSE PRACTITIONER

## 2023-03-15 RX ORDER — VIBEGRON 75 MG/1
75 TABLET, FILM COATED ORAL DAILY
Qty: 30 TABLET | Refills: 3 | Status: SHIPPED | OUTPATIENT
Start: 2023-03-15 | End: 2023-04-14

## 2023-03-15 NOTE — PATIENT INSTRUCTIONS
Stop Myrbetriq, can trial stopping Vesicare. Sample given for [facility], take once daily. Patient instructed to avoid bladder irritants in diet such as coffee, tea, caffeine, alcohol, carbonated beverages, spicy/acidic foods. Patient given a list of these to avoid.

## 2023-03-15 NOTE — PROGRESS NOTES
[address]  [address]  [address]  [facility] [address]  Dept: [phone]  Loc: [phone]    Visit Date: 3/15/2023        HPI:     Kelly Ramos is a 47 y.o. male who presents today for:  Chief Complaint   Patient presents with    Bladder Problem     Sometimes cannot make it to restroom but has not troubles going     Medication Check       HPI  Patient presents to urology clinic with history of BPH, OAB and BNC. Patient reports urinary urgency and frequency. Drinks about 2 mountain dews daily and smokes 10 cigarettes every day. Discussed these could be cause of symptoms d/t irritating bladder. Currently taking Myrbetriq and Vesicare. PVR acceptable. Gemtesa samples given, discontinue Myrbetriq. Urinary flow is good, waking up once at night. 9/7/2022  Severe BPH requiring 10 minutes to go. On Flomax and this isnt helping much. We proceeded with TURP and he is doing better but still having some OAB and takes Vesicare for this. He then developed a [address] [place] [address] and this was opened and now his is doing excellent. Current Outpatient Medications   Medication Sig Dispense Refill    vibegron (GEMTESA) 75 MG TABS tablet Take 1 tablet by mouth daily 30 tablet 3    mirabegron (MYRBETRIQ) 50 MG TB24 Take 50 mg by mouth daily 30 tablet 3    gabapentin (NEURONTIN) 300 MG capsule Take 1 capsule by mouth 3 times daily for 30 days.  90 capsule 0    solifenacin (VESICARE) 10 MG tablet Take 1 tablet by mouth daily 30 tablet 5    sildenafil (VIAGRA) 100 MG tablet Take 1 tablet by mouth daily as needed for Erectile Dysfunction 30 tablet 2    DULoxetine (CYMBALTA) 60 MG extended release capsule TAKE 1 CAPSULE BY MOUTH EVERY DAY      DULoxetine (CYMBALTA) 30 MG extended release capsule 2 times daily Pt takes 60mg in am and 30mg in the pm      atorvastatin (LIPITOR) 20 MG tablet Take 20 mg by mouth daily      traZODone (DESYREL) 50 MG tablet Take 50 mg by mouth nightly as needed       etodolac (LODINE) 400 MG tablet TAKE 1 TABLET BY MOUTH THREE TIMES A DAY 90 tablet 3    etodolac (LODINE) 400 MG tablet TAKE 1 TABLET BY MOUTH THREE TIMES A DAY 90 tablet 0     No current facility-administered medications for this visit. Past Medical History  Jeannette Jaime  has a past medical history of Cerebral artery occlusion with cerebral infarction [facility]), Chronic back pain, DDD (degenerative disc disease), lumbar, Enlarged prostate, Fibromyalgia, Hyperlipidemia, Hypertension, Kidney stones, and Lumbago. Past Surgical History  The patient  has a past surgical history that includes rhinoplasty (2017); Colonoscopy; Lithotripsy; Pain management procedure (N/A, 1/5/2021); Pain management procedure (N/A, 3/26/2021); Pain management procedure (Bilateral, 5/3/2021); TURP (N/A, 5/26/2021); TURP (N/A, 11/10/2021); Pain management procedure (Bilateral, 4/26/2022); Pain management procedure (Bilateral, 7/5/2022); and Pain management procedure (Bilateral, 11/28/2022). Family History  This patient's family history includes Cancer in his mother; Diabetes in his mother. Social History  Jeannette Jaime  reports that he has been smoking cigarettes. He has been smoking an average of .5 packs per day. He has never used smokeless tobacco. He reports that he does not currently use alcohol. He reports current drug use. Drug: Marijuana Adry Pleas). Subjective:      REVIEW OF SYSTEMS:  Constitutional: negative  Eyes: negative  Respiratory: negative  Cardiovascular: negative  Gastrointestinal: negative  Musculoskeletal: negative  Genitourinary: negative except for what is in HPI  Skin: negative   Neurological: negative  Hematological/Lymphatic: negative  Psychological: negative    Objective:   Resp 18   Ht 5' 11\" (1.803 m)   Wt 193 lb (87.5 kg)   BMI 26.92 kg/m²     Patient is a 47 y.o. male in no acute distress and alert and oriented to person, place and time. Pulmonary: Non-labored respiration.   Cardiovascular: Normal rate, regular rhythm, normal peripheral pulses. Skin: Warm and dry. Psych: Normal mood and affect. Genitourinary: Bladder non-distended and non-tender. POC  Results for POC orders placed in visit on 03/15/23   poct post void residual   Result Value Ref Range    post void residual 11 ml         Patients recent PSA values are as follows  No results found for: PSA, PSADIA     Recent BUN/Creatinine:  Lab Results   Component Value Date/Time    BUN 14 10/26/2021 11:30 AM    CREATININE 1.1 10/26/2021 11:30 AM       Radiology  No recent imaging reviewed. Assessment/Plan:   1. Bladder neck contracture  PVR acceptable, good urinary flow. - POCT Urinalysis No Micro (Auto)  - poct post void residual    2. OAB (overactive bladder)  Samples given for Gemtesa, stop Myrbetriq. Diet modifications discussed. Patient instructed to avoid bladder irritants in diet such as coffee, tea, caffeine, alcohol, carbonated beverages, spicy/acidic foods. 3. Hypertrophy of prostate with urinary obstruction  Hx. of TURP    4. Erectile dysfunction, unspecified erectile dysfunction type  Doing well with Viagra 100mg PRN. -Patient has no other questions, comments, or concerns.   -They agree with and understand the plan of care. -The patient was encouraged to call the office or seek emergency care should this change.       ANGELINA Olson - CNP

## 2023-03-30 RX ORDER — GABAPENTIN 300 MG/1
300 CAPSULE ORAL 3 TIMES DAILY
Qty: 90 CAPSULE | Refills: 0 | Status: SHIPPED | OUTPATIENT
Start: 2023-04-07 | End: 2023-05-07

## 2023-04-17 ENCOUNTER — HOSPITAL ENCOUNTER (OUTPATIENT)
Age: 53
Discharge: HOME | End: 2023-04-17
Payer: MEDICAID

## 2023-04-17 ENCOUNTER — HOSPITAL ENCOUNTER (OUTPATIENT)
Age: 53
Setting detail: SPECIMEN
Discharge: HOME | End: 2023-04-17

## 2023-04-17 ENCOUNTER — HOSPITAL ENCOUNTER (OUTPATIENT)
Dept: GENERAL RADIOLOGY | Age: 53
Discharge: HOME | End: 2023-04-17
Payer: MEDICAID

## 2023-04-17 DIAGNOSIS — R07.9 CHEST PAIN, UNSPECIFIED TYPE: ICD-10-CM

## 2023-04-17 LAB
ABSOLUTE EOS #: 0.26 K/UL (ref 0–0.44)
ABSOLUTE IMMATURE GRANULOCYTE: 0.04 K/UL (ref 0–0.3)
ABSOLUTE LYMPH #: 2.57 K/UL (ref 1.1–3.7)
ABSOLUTE MONO #: 0.72 K/UL (ref 0.1–1.2)
ALBUMIN SERPL-MCNC: 4.3 G/DL (ref 3.5–5.2)
ALBUMIN/GLOBULIN RATIO: 1.5 (ref 1–2.5)
ALP SERPL-CCNC: 83 U/L (ref 40–129)
ALT SERPL-CCNC: 18 U/L (ref 5–41)
ANION GAP SERPL CALCULATED.3IONS-SCNC: 12 MMOL/L (ref 9–17)
AST SERPL-CCNC: 16 U/L
BASOPHILS # BLD: 1 % (ref 0–2)
BASOPHILS ABSOLUTE: 0.05 K/UL (ref 0–0.2)
BILIRUB SERPL-MCNC: 0.5 MG/DL (ref 0.3–1.2)
BUN SERPL-MCNC: 15 MG/DL (ref 6–20)
CALCIUM SERPL-MCNC: 9.5 MG/DL (ref 8.6–10.4)
CHLORIDE SERPL-SCNC: 106 MMOL/L (ref 98–107)
CHOLEST SERPL-MCNC: 220 MG/DL
CHOLESTEROL/HDL RATIO: 6.1
CO2 SERPL-SCNC: 23 MMOL/L (ref 20–31)
CREAT SERPL-MCNC: 1.15 MG/DL (ref 0.7–1.2)
EOSINOPHILS RELATIVE PERCENT: 3 % (ref 1–4)
GFR SERPL CREATININE-BSD FRML MDRD: >60 ML/MIN/1.73M2
GLUCOSE SERPL-MCNC: 73 MG/DL (ref 70–99)
HCT VFR BLD AUTO: 50 % (ref 40.7–50.3)
HDLC SERPL-MCNC: 36 MG/DL
HGB BLD-MCNC: 16.1 G/DL (ref 13–17)
IMMATURE GRANULOCYTES: 1 %
LDLC SERPL CALC-MCNC: 148 MG/DL (ref 0–130)
LYMPHOCYTES # BLD: 29 % (ref 24–43)
MCH RBC QN AUTO: 30.1 PG (ref 25.2–33.5)
MCHC RBC AUTO-ENTMCNC: 32.2 G/DL (ref 28.4–34.8)
MCV RBC AUTO: 93.5 FL (ref 82.6–102.9)
MONOCYTES # BLD: 8 % (ref 3–12)
NRBC AUTOMATED: 0 PER 100 WBC
PDW BLD-RTO: 12.7 % (ref 11.8–14.4)
PLATELET # BLD AUTO: 347 K/UL (ref 138–453)
PMV BLD AUTO: 10.2 FL (ref 8.1–13.5)
POTASSIUM SERPL-SCNC: 5 MMOL/L (ref 3.7–5.3)
PROT SERPL-MCNC: 7.1 G/DL (ref 6.4–8.3)
RBC # BLD: 5.35 M/UL (ref 4.21–5.77)
SEG NEUTROPHILS: 58 % (ref 36–65)
SEGMENTED NEUTROPHILS ABSOLUTE COUNT: 5.14 K/UL (ref 1.5–8.1)
SODIUM SERPL-SCNC: 141 MMOL/L (ref 135–144)
TRIGL SERPL-MCNC: 179 MG/DL
WBC # BLD AUTO: 8.8 K/UL (ref 3.5–11.3)

## 2023-04-17 PROCEDURE — 71046 X-RAY EXAM CHEST 2 VIEWS: CPT

## 2023-04-25 RX ORDER — ETODOLAC 400 MG/1
TABLET, FILM COATED ORAL
Qty: 90 TABLET | Refills: 3 | OUTPATIENT
Start: 2023-04-25 | End: 2023-05-24

## 2023-04-25 RX ORDER — SOLIFENACIN SUCCINATE 10 MG/1
10 TABLET, FILM COATED ORAL DAILY
Qty: 30 TABLET | Refills: 0 | Status: SHIPPED | OUTPATIENT
Start: 2023-04-25 | End: 2023-05-18 | Stop reason: SDUPTHER

## 2023-04-26 RX ORDER — GABAPENTIN 300 MG/1
300 CAPSULE ORAL 3 TIMES DAILY
Qty: 90 CAPSULE | Refills: 0 | Status: SHIPPED | OUTPATIENT
Start: 2023-04-26 | End: 2023-05-26

## 2023-05-01 RX ORDER — GABAPENTIN 300 MG/1
300 CAPSULE ORAL 3 TIMES DAILY
Qty: 90 CAPSULE | Refills: 0 | OUTPATIENT
Start: 2023-05-01 | End: 2023-05-31

## 2023-05-02 ENCOUNTER — HOSPITAL ENCOUNTER (OUTPATIENT)
Dept: NON INVASIVE DIAGNOSTICS | Age: 53
Discharge: HOME | End: 2023-05-02
Payer: MEDICAID

## 2023-05-02 DIAGNOSIS — R07.9 CHEST PAIN, UNSPECIFIED TYPE: ICD-10-CM

## 2023-05-02 PROCEDURE — 6360000002 HC RX W HCPCS

## 2023-05-02 PROCEDURE — 93017 CV STRESS TEST TRACING ONLY: CPT | Performed by: INTERNAL MEDICINE

## 2023-05-02 PROCEDURE — 3430000000 HC RX DIAGNOSTIC RADIOPHARMACEUTICAL: Performed by: INTERNAL MEDICINE

## 2023-05-02 PROCEDURE — A9500 TC99M SESTAMIBI: HCPCS | Performed by: INTERNAL MEDICINE

## 2023-05-02 PROCEDURE — 78452 HT MUSCLE IMAGE SPECT MULT: CPT | Performed by: INTERNAL MEDICINE

## 2023-05-02 RX ORDER — TETRAKIS(2-METHOXYISOBUTYLISOCYANIDE)COPPER(I) TETRAFLUOROBORATE 1 MG/ML
9.3 INJECTION, POWDER, LYOPHILIZED, FOR SOLUTION INTRAVENOUS
Status: COMPLETED | OUTPATIENT
Start: 2023-05-02 | End: 2023-05-02

## 2023-05-02 RX ORDER — TETRAKIS(2-METHOXYISOBUTYLISOCYANIDE)COPPER(I) TETRAFLUOROBORATE 1 MG/ML
31.8 INJECTION, POWDER, LYOPHILIZED, FOR SOLUTION INTRAVENOUS
Status: COMPLETED | OUTPATIENT
Start: 2023-05-02 | End: 2023-05-02

## 2023-05-02 RX ADMIN — Medication 31.8 MILLICURIE: at 13:10

## 2023-05-02 RX ADMIN — Medication 9.3 MILLICURIE: at 12:25

## 2023-05-10 NOTE — PROGRESS NOTES
[address] [facility]  [address] [facility] [address]  Dept: [phone]  Loc: [phone]    Visit Date: 5/11/2023        HPI:     Brenda Vazquez is a 47 y.o. male who presents today for:  Chief Complaint   Patient presents with    Erectile Dysfunction    Follow-up     Bladder neck contracture and OAB       HPI  Patient presents to urology clinic with history of BPH, OAB and BNC. Patient reports urinary urgency and frequency. Drinks about 2 mountain dews daily and smokes 10 cigarettes every day. Discussed these could be cause of symptoms d/t irritating bladder. Currently taking Gemtesa and Vesicare. PVR acceptable. Doing okay from this standpoint. Urinary flow is good, however reports difficulty initiating stream at times. IPSS 10/35 during visit today. 9/7/2022  Severe BPH requiring 10 minutes to go. On Flomax and this isnt helping much. We proceeded with TURP and he is doing better but still having some OAB and takes Vesicare for this. He then developed a [address] [place] [address] and this was opened and now his is doing excellent. Current Outpatient Medications   Medication Sig Dispense Refill    vibegron (GEMTESA) 75 MG TABS tablet Take 1 tablet by mouth daily 90 each 3    tamsulosin (FLOMAX) 0.4 MG capsule Take 1 capsule by mouth daily 90 capsule 2    tadalafil (CIALIS) 20 MG tablet Take 1 tablet by mouth as needed for Erectile Dysfunction Take at least 1/2 hour prior to sexual intercourse 30 tablet 1    gabapentin (NEURONTIN) 300 MG capsule Take 1 capsule by mouth 3 times daily for 30 days.  90 capsule 0    solifenacin (VESICARE) 10 MG tablet Take 1 tablet by mouth daily 30 tablet 0    DULoxetine (CYMBALTA) 60 MG extended release capsule TAKE 1 CAPSULE BY MOUTH EVERY DAY      DULoxetine (CYMBALTA) 30 MG extended release capsule 2 times daily Pt takes 60mg in am and 30mg in the pm      atorvastatin (LIPITOR) 20 MG tablet Take 1 tablet by mouth daily

## 2023-05-11 ENCOUNTER — OFFICE VISIT (OUTPATIENT)
Dept: UROLOGY | Age: 53
End: 2023-05-11

## 2023-05-11 VITALS — WEIGHT: 193 LBS | HEIGHT: 71 IN | RESPIRATION RATE: 16 BRPM | BODY MASS INDEX: 27.02 KG/M2

## 2023-05-11 DIAGNOSIS — N32.81 OAB (OVERACTIVE BLADDER): ICD-10-CM

## 2023-05-11 DIAGNOSIS — N13.8 HYPERTROPHY OF PROSTATE WITH URINARY OBSTRUCTION: ICD-10-CM

## 2023-05-11 DIAGNOSIS — N52.9 ERECTILE DYSFUNCTION, UNSPECIFIED ERECTILE DYSFUNCTION TYPE: ICD-10-CM

## 2023-05-11 DIAGNOSIS — N40.1 HYPERTROPHY OF PROSTATE WITH URINARY OBSTRUCTION: ICD-10-CM

## 2023-05-11 DIAGNOSIS — N32.0 BLADDER NECK CONTRACTURE: Primary | ICD-10-CM

## 2023-05-11 LAB — POST VOID RESIDUAL (PVR): 53 ML

## 2023-05-11 RX ORDER — VIBEGRON 75 MG/1
75 TABLET, FILM COATED ORAL DAILY
Qty: 90 EACH | Refills: 3 | Status: SHIPPED | OUTPATIENT
Start: 2023-05-11 | End: 2023-08-09

## 2023-05-11 RX ORDER — TAMSULOSIN HYDROCHLORIDE 0.4 MG/1
0.4 CAPSULE ORAL DAILY
Qty: 90 CAPSULE | Refills: 2 | Status: SHIPPED | OUTPATIENT
Start: 2023-05-11 | End: 2023-08-09

## 2023-05-11 RX ORDER — TADALAFIL 20 MG/1
20 TABLET ORAL PRN
Qty: 30 TABLET | Refills: 1 | Status: SHIPPED | OUTPATIENT
Start: 2023-05-11 | End: 2023-06-10

## 2023-05-15 RX ORDER — GABAPENTIN 300 MG/1
300 CAPSULE ORAL 3 TIMES DAILY
Qty: 90 CAPSULE | Refills: 0 | OUTPATIENT
Start: 2023-05-15 | End: 2023-06-14

## 2023-05-18 RX ORDER — GABAPENTIN 300 MG/1
300 CAPSULE ORAL 3 TIMES DAILY
Qty: 90 CAPSULE | Refills: 0 | Status: SHIPPED | OUTPATIENT
Start: 2023-05-18 | End: 2023-06-17

## 2023-05-19 RX ORDER — SOLIFENACIN SUCCINATE 10 MG/1
10 TABLET, FILM COATED ORAL DAILY
Qty: 30 TABLET | Refills: 0 | Status: SHIPPED | OUTPATIENT
Start: 2023-05-19 | End: 2024-05-18

## 2023-05-19 NOTE — TELEPHONE ENCOUNTER
Kia Velez called requesting a refill on the following medications:  Requested Prescriptions     Pending Prescriptions Disp Refills    solifenacin (VESICARE) 10 MG tablet 30 tablet 0     Sig: Take 1 tablet by mouth daily     Pharmacy verified:  .[email or web address]      Date of last visit: 05/11/2023  Date of next visit (if applicable) 22/82/8174

## 2023-05-25 RX ORDER — ETODOLAC 400 MG/1
TABLET, FILM COATED ORAL
Qty: 90 TABLET | Refills: 3 | Status: SHIPPED | OUTPATIENT
Start: 2023-05-25

## 2023-05-25 NOTE — TELEPHONE ENCOUNTER
OARRS reviewed. UDS not found. Rx last filled 02/11/2023. Last seen: 3/13/2023.  Follow-up:   Future Appointments   Date Time Provider Pooja Aviva   6/12/2023  8:30 AM ANGELINA Alfaro - CNP [facility]   8/10/2023  9:00 AM ANGELINA Aguilar [phone]

## 2023-06-28 RX ORDER — CYCLOBENZAPRINE HCL 5 MG
5 TABLET ORAL 3 TIMES DAILY PRN
Qty: 90 TABLET | Refills: 0 | Status: SHIPPED | OUTPATIENT
Start: 2023-06-28 | End: 2023-07-28

## 2023-07-28 RX ORDER — GABAPENTIN 300 MG/1
300 CAPSULE ORAL 3 TIMES DAILY
Qty: 90 CAPSULE | Refills: 0 | OUTPATIENT
Start: 2023-07-28 | End: 2023-08-27

## 2023-07-28 RX ORDER — GABAPENTIN 300 MG/1
300 CAPSULE ORAL 3 TIMES DAILY
Qty: 90 CAPSULE | Refills: 0 | Status: SHIPPED | OUTPATIENT
Start: 2023-08-01 | End: 2023-08-31

## 2023-07-28 NOTE — TELEPHONE ENCOUNTER
OARRS reviewed. UDS: + for  Gabapentin, Cyclobenzaprine, Marijuana. Last seen: 6/12/2023.  Follow-up: 9/12/2023

## 2023-08-03 RX ORDER — CYCLOBENZAPRINE HCL 5 MG
5 TABLET ORAL 3 TIMES DAILY PRN
Qty: 90 TABLET | Refills: 0 | Status: SHIPPED | OUTPATIENT
Start: 2023-08-03 | End: 2023-09-02

## 2023-08-08 NOTE — PROGRESS NOTES
[address] [facility]  [address] [address]  Dept: [phone]  Loc: [phone]    Visit Date: 8/10/2023        HPI:     Chester Briseno is a 46 y.o. male who presents today for:  Chief Complaint   Patient presents with    Follow-up     Symptoms check        HPI  Patient presents to urology clinic with history of BPH, OAB and BNC. Patient reports urinary urgency and frequency. Drinks about 2 mountain dews daily and smokes 10 cigarettes every day. Discussed these could be cause of symptoms d/t irritating bladder. Currently taking Vesicare. Gemtesa too expensive. PVRs have been acceptable. Urinary flow is good, however reports difficulty initiating stream at times. He has been on flomax and he reports still very hard to initiate stream.    Failed Viagra and Cialis for ED. Discussed Trimix and IPP.     9/7/2022  Severe BPH requiring 10 minutes to go. On Flomax and this isnt helping much. We proceeded with TURP and he is doing better but still having some OAB and takes Vesicare for this. He then developed a [address] [place] [address] Avenue and this was opened and now his is doing excellent. Current Outpatient Medications   Medication Sig Dispense Refill    solifenacin (VESICARE) 10 MG tablet Take 1 tablet by mouth daily 90 tablet 3    cyclobenzaprine (FLEXERIL) 5 MG tablet TAKE 1 TABLET BY MOUTH THREE TIMES A DAY AS NEEDED FOR MUSCLE SPASM 90 tablet 0    gabapentin (NEURONTIN) 300 MG capsule Take 1 capsule by mouth 3 times daily for 30 days.  90 capsule 0    etodolac (LODINE) 400 MG tablet Take 1 tablet by mouth 3 times daily 90 tablet 3    tamsulosin (FLOMAX) 0.4 MG capsule Take 1 capsule by mouth daily 90 capsule 2    tadalafil (CIALIS) 20 MG tablet Take 1 tablet by mouth as needed for Erectile Dysfunction Take at least 1/2 hour prior to sexual intercourse 30 tablet 1    etodolac (LODINE) 400 MG tablet TAKE 1 TABLET BY MOUTH THREE TIMES A DAY 90 tablet 0    DULoxetine

## 2023-08-09 RX ORDER — CYCLOBENZAPRINE HCL 5 MG
TABLET ORAL
Qty: 90 TABLET | Refills: 0 | Status: SHIPPED | OUTPATIENT
Start: 2023-08-09

## 2023-08-09 NOTE — TELEPHONE ENCOUNTER
OARRS reviewed. UDS: + for  gabapentin, cyclobenzaprine, marijuana as of 6/15/2023. Last seen: 6/12/2023.  Follow-up:   Future Appointments   Date Time Provider [address]   8/10/2023  9:00 AM [address]   9/12/2023  8:45 AM Piero Londono, APRN - CNP [facility] [place]

## 2023-08-10 ENCOUNTER — OFFICE VISIT (OUTPATIENT)
Dept: UROLOGY | Age: 53
End: 2023-08-10
Payer: MEDICAID

## 2023-08-10 VITALS — WEIGHT: 193 LBS | HEIGHT: 69 IN | RESPIRATION RATE: 16 BRPM | BODY MASS INDEX: 28.58 KG/M2

## 2023-08-10 DIAGNOSIS — N32.0 BLADDER NECK CONTRACTURE: Primary | ICD-10-CM

## 2023-08-10 DIAGNOSIS — N40.1 HYPERTROPHY OF PROSTATE WITH URINARY OBSTRUCTION: ICD-10-CM

## 2023-08-10 DIAGNOSIS — N32.81 OAB (OVERACTIVE BLADDER): ICD-10-CM

## 2023-08-10 DIAGNOSIS — N52.9 ERECTILE DYSFUNCTION, UNSPECIFIED ERECTILE DYSFUNCTION TYPE: ICD-10-CM

## 2023-08-10 DIAGNOSIS — N13.8 HYPERTROPHY OF PROSTATE WITH URINARY OBSTRUCTION: ICD-10-CM

## 2023-08-10 PROCEDURE — 3017F COLORECTAL CA SCREEN DOC REV: CPT | Performed by: NURSE PRACTITIONER

## 2023-08-10 PROCEDURE — G8427 DOCREV CUR MEDS BY ELIG CLIN: HCPCS | Performed by: NURSE PRACTITIONER

## 2023-08-10 PROCEDURE — 4004F PT TOBACCO SCREEN RCVD TLK: CPT | Performed by: NURSE PRACTITIONER

## 2023-08-10 PROCEDURE — G8419 CALC BMI OUT NRM PARAM NOF/U: HCPCS | Performed by: NURSE PRACTITIONER

## 2023-08-10 PROCEDURE — 99214 OFFICE O/P EST MOD 30 MIN: CPT | Performed by: NURSE PRACTITIONER

## 2023-08-10 RX ORDER — SOLIFENACIN SUCCINATE 10 MG/1
10 TABLET, FILM COATED ORAL DAILY
Qty: 90 TABLET | Refills: 3 | Status: SHIPPED | OUTPATIENT
Start: 2023-08-10 | End: 2023-11-08

## 2023-08-10 NOTE — PATIENT INSTRUCTIONS
Call sooner with any questions or concerns. We discussed Trimix infections and implantable penile prothesis.

## 2023-09-02 DIAGNOSIS — N13.8 HYPERTROPHY OF PROSTATE WITH URINARY OBSTRUCTION: ICD-10-CM

## 2023-09-02 DIAGNOSIS — N40.1 HYPERTROPHY OF PROSTATE WITH URINARY OBSTRUCTION: ICD-10-CM

## 2023-09-02 DIAGNOSIS — N32.81 OAB (OVERACTIVE BLADDER): ICD-10-CM

## 2023-09-02 DIAGNOSIS — N52.9 ERECTILE DYSFUNCTION, UNSPECIFIED ERECTILE DYSFUNCTION TYPE: ICD-10-CM

## 2023-09-05 RX ORDER — TADALAFIL 20 MG/1
20 TABLET ORAL PRN
Qty: 30 TABLET | Refills: 1 | Status: SHIPPED | OUTPATIENT
Start: 2023-09-05 | End: 2023-10-05

## 2023-09-05 RX ORDER — GABAPENTIN 300 MG/1
300 CAPSULE ORAL 3 TIMES DAILY
Qty: 90 CAPSULE | Refills: 0 | Status: SHIPPED | OUTPATIENT
Start: 2023-09-05 | End: 2023-10-05

## 2023-09-05 RX ORDER — CYCLOBENZAPRINE HCL 5 MG
5 TABLET ORAL 3 TIMES DAILY PRN
Qty: 90 TABLET | Refills: 0 | Status: SHIPPED | OUTPATIENT
Start: 2023-09-05 | End: 2023-10-05

## 2023-09-05 RX ORDER — TAMSULOSIN HYDROCHLORIDE 0.4 MG/1
0.4 CAPSULE ORAL DAILY
Qty: 90 CAPSULE | Refills: 2 | Status: SHIPPED | OUTPATIENT
Start: 2023-09-05 | End: 2023-12-04

## 2023-09-05 RX ORDER — SOLIFENACIN SUCCINATE 10 MG/1
10 TABLET, FILM COATED ORAL DAILY
Qty: 90 TABLET | Refills: 3 | Status: SHIPPED | OUTPATIENT
Start: 2023-09-05 | End: 2023-12-04

## 2023-09-05 NOTE — TELEPHONE ENCOUNTER
OARRS reviewed. UDS: + for  Gabapentin, Cyclobenzaprine, Marijuana/Marinol. Last seen: 6/12/2023.  Follow-up: 9/12/2023

## 2023-09-05 NOTE — TELEPHONE ENCOUNTER
Anderson Browne called requesting a refill on the following medications:  Requested Prescriptions     Pending Prescriptions Disp Refills    tamsulosin (FLOMAX) 0.4 MG capsule 90 capsule 2     Sig: Take 1 capsule by mouth daily    tadalafil (CIALIS) 20 MG tablet 30 tablet 1     Sig: Take 1 tablet by mouth as needed for Erectile Dysfunction Take at least 1/2 hour prior to sexual intercourse    solifenacin (VESICARE) 10 MG tablet 90 tablet 3     Sig: Take 1 tablet by mouth daily     Pharmacy verified:  .[email or web address]      Date of last visit: 08/10/2023  Date of next visit (if applicable): 97/23/0718

## 2023-09-12 ENCOUNTER — HOSPITAL ENCOUNTER (OUTPATIENT)
Age: 53
Discharge: HOME | End: 2023-09-12
Payer: MEDICAID

## 2023-09-12 ENCOUNTER — OFFICE VISIT (OUTPATIENT)
Dept: PHYSICAL MEDICINE AND REHAB | Age: 53
End: 2023-09-12
Payer: MEDICAID

## 2023-09-12 ENCOUNTER — NURSE ONLY (OUTPATIENT)
Dept: LAB | Age: 53
End: 2023-09-12

## 2023-09-12 ENCOUNTER — HOSPITAL ENCOUNTER (OUTPATIENT)
Dept: GENERAL RADIOLOGY | Age: 53
Discharge: HOME | End: 2023-09-12
Payer: MEDICAID

## 2023-09-12 VITALS
SYSTOLIC BLOOD PRESSURE: 112 MMHG | BODY MASS INDEX: 28.58 KG/M2 | WEIGHT: 193 LBS | DIASTOLIC BLOOD PRESSURE: 78 MMHG | HEIGHT: 69 IN

## 2023-09-12 DIAGNOSIS — M48.061 LUMBAR FORAMINAL STENOSIS: ICD-10-CM

## 2023-09-12 DIAGNOSIS — M25.562 CHRONIC PAIN OF BOTH KNEES: ICD-10-CM

## 2023-09-12 DIAGNOSIS — M47.816 LUMBAR SPONDYLOSIS: ICD-10-CM

## 2023-09-12 DIAGNOSIS — M48.062 LUMBAR STENOSIS WITH NEUROGENIC CLAUDICATION: ICD-10-CM

## 2023-09-12 DIAGNOSIS — G89.4 CHRONIC PAIN SYNDROME: ICD-10-CM

## 2023-09-12 DIAGNOSIS — M47.819 FACET ARTHROPATHY OF SPINE: ICD-10-CM

## 2023-09-12 DIAGNOSIS — M51.36 ANNULAR TEAR OF LUMBAR DISC: ICD-10-CM

## 2023-09-12 DIAGNOSIS — M54.50 LUMBAR PAIN: ICD-10-CM

## 2023-09-12 DIAGNOSIS — M25.561 CHRONIC PAIN OF BOTH KNEES: ICD-10-CM

## 2023-09-12 DIAGNOSIS — G89.29 CHRONIC PAIN OF BOTH KNEES: ICD-10-CM

## 2023-09-12 DIAGNOSIS — M54.17 LUMBOSACRAL RADICULITIS: ICD-10-CM

## 2023-09-12 DIAGNOSIS — M79.605 PAIN IN BOTH LOWER EXTREMITIES: ICD-10-CM

## 2023-09-12 DIAGNOSIS — M47.816 LUMBAR SPONDYLOSIS: Primary | ICD-10-CM

## 2023-09-12 DIAGNOSIS — M79.604 PAIN IN BOTH LOWER EXTREMITIES: ICD-10-CM

## 2023-09-12 LAB
ALBUMIN SERPL BCG-MCNC: 4.3 G/DL (ref 3.5–5.1)
ANION GAP SERPL CALC-SCNC: 10 MEQ/L (ref 8–16)
BUN SERPL-MCNC: 14 MG/DL (ref 7–22)
CALCIUM SERPL-MCNC: 9.9 MG/DL (ref 8.5–10.5)
CHLORIDE SERPL-SCNC: 103 MEQ/L (ref 98–111)
CO2 SERPL-SCNC: 24 MEQ/L (ref 23–33)
CREAT SERPL-MCNC: 1.2 MG/DL (ref 0.4–1.2)
GFR SERPL CREATININE-BSD FRML MDRD: > 60 ML/MIN/1.73M2
GLUCOSE SERPL-MCNC: 72 MG/DL (ref 70–108)
PHOSPHATE SERPL-MCNC: 3.5 MG/DL (ref 2.4–4.7)
POTASSIUM SERPL-SCNC: 4.5 MEQ/L (ref 3.5–5.2)
SODIUM SERPL-SCNC: 137 MEQ/L (ref 135–145)

## 2023-09-12 PROCEDURE — 3017F COLORECTAL CA SCREEN DOC REV: CPT | Performed by: NURSE PRACTITIONER

## 2023-09-12 PROCEDURE — 1036F TOBACCO NON-USER: CPT | Performed by: NURSE PRACTITIONER

## 2023-09-12 PROCEDURE — G8419 CALC BMI OUT NRM PARAM NOF/U: HCPCS | Performed by: NURSE PRACTITIONER

## 2023-09-12 PROCEDURE — 73562 X-RAY EXAM OF KNEE 3: CPT

## 2023-09-12 PROCEDURE — G8427 DOCREV CUR MEDS BY ELIG CLIN: HCPCS | Performed by: NURSE PRACTITIONER

## 2023-09-12 PROCEDURE — 99214 OFFICE O/P EST MOD 30 MIN: CPT | Performed by: NURSE PRACTITIONER

## 2023-09-12 RX ORDER — BACLOFEN 10 MG/1
10 TABLET ORAL EVERY 8 HOURS
COMMUNITY
Start: 2020-09-17 | End: 2023-09-12

## 2023-09-12 RX ORDER — MIRABEGRON 50 MG/1
50 TABLET, FILM COATED, EXTENDED RELEASE ORAL DAILY
COMMUNITY
Start: 2023-08-04

## 2023-09-12 RX ORDER — LISINOPRIL 10 MG/1
10 TABLET ORAL DAILY
COMMUNITY
Start: 2023-08-28

## 2023-09-12 ASSESSMENT — ENCOUNTER SYMPTOMS
GASTROINTESTINAL NEGATIVE: 1
BACK PAIN: 1
COUGH: 0
WHEEZING: 0
SHORTNESS OF BREATH: 0
RESPIRATORY NEGATIVE: 1

## 2023-09-12 NOTE — PROGRESS NOTES
Plan: OARRS reviewed. Current MED: 0  Patient was not offered naloxone for home. Discussed long term side effects of medications, tolerance, dependency and addiction. Previous UDS reviewed  UDS preformed today for compliance. Patient told can not receive any pain medications from any other source. No evidence of abuse, diversion or aberrant behavior. Medications and/or procedures to improve function and quality of life- patient understanding with this and that may not be pain free  Discussed with patient about safe storage of medications at home  Discussed possible weaning of medication dosing dependent on treatment/procedure results. Discussed with patient about risks with procedure including infection, reaction to medication, increased pain, or bleeding. Procedure notes reviewed in detail   Continues to receive over 80% relief of low back pain from L-facet RFA. Receiving over 80% relief of leg pain from TFLESI. Discussed repeating injections in future when needing to. Ordered bilateral knee xrays due to increasing pain in knees. Discussed possible injections in future depending on results   Continue Neurontin 600 mg in am and  300 at bedtime- filled 9/5/2023  Continue flexeril 5 mg TID prn - filled 9/5/2023  Continue Etodalac prn- still has plenty   Uses THC so no opioids   Ordered renal function panel again as patient did not get last visit   Pain remains adequately controlled at this time     Meds. Prescribed:   No orders of the defined types were placed in this encounter. Return in about 3 months (around 12/12/2023), or if symptoms worsen or fail to improve, for follow up  for medications.                Electronically signed by Corinn Goodpasture, APRN - CNP on9/12/2023 at 9:18 AM

## 2023-10-03 RX ORDER — CYCLOBENZAPRINE HCL 5 MG
5 TABLET ORAL 3 TIMES DAILY PRN
Qty: 90 TABLET | Refills: 0 | Status: SHIPPED | OUTPATIENT
Start: 2023-10-03 | End: 2023-11-02

## 2023-10-03 RX ORDER — GABAPENTIN 300 MG/1
300 CAPSULE ORAL 3 TIMES DAILY
Qty: 90 CAPSULE | Refills: 0 | Status: SHIPPED | OUTPATIENT
Start: 2023-10-05 | End: 2023-11-04

## 2023-10-03 NOTE — TELEPHONE ENCOUNTER
OARRS reviewed. UDS: + for  gabapentin, cyclobenzaprine and marijuana present. Last seen: 9/12/2023.  Follow-up:   Future Appointments   Date Time Provider [address]   11/1/2023  9:00 AM Rebeka Lewis MD [facility] [address]   12/12/2023  9:15 AM ANGELINA Currie - CNP [facility] [place]

## 2023-11-01 ENCOUNTER — PROCEDURE VISIT (OUTPATIENT)
Dept: UROLOGY | Age: 53
End: 2023-11-01
Payer: MEDICAID

## 2023-11-01 VITALS — HEIGHT: 69 IN | BODY MASS INDEX: 28.58 KG/M2 | RESPIRATION RATE: 16 BRPM | WEIGHT: 193 LBS

## 2023-11-01 DIAGNOSIS — N32.0 BLADDER NECK CONTRACTURE: Primary | ICD-10-CM

## 2023-11-01 PROCEDURE — G8484 FLU IMMUNIZE NO ADMIN: HCPCS | Performed by: UROLOGY

## 2023-11-01 PROCEDURE — G8427 DOCREV CUR MEDS BY ELIG CLIN: HCPCS | Performed by: UROLOGY

## 2023-11-01 PROCEDURE — 3017F COLORECTAL CA SCREEN DOC REV: CPT | Performed by: UROLOGY

## 2023-11-01 PROCEDURE — G8419 CALC BMI OUT NRM PARAM NOF/U: HCPCS | Performed by: UROLOGY

## 2023-11-01 PROCEDURE — 99214 OFFICE O/P EST MOD 30 MIN: CPT | Performed by: UROLOGY

## 2023-11-01 PROCEDURE — 1036F TOBACCO NON-USER: CPT | Performed by: UROLOGY

## 2023-11-01 RX ORDER — MIRABEGRON 50 MG/1
50 TABLET, FILM COATED, EXTENDED RELEASE ORAL DAILY
Qty: 90 TABLET | Refills: 4 | Status: SHIPPED | OUTPATIENT
Start: 2023-11-01 | End: 2025-01-24

## 2023-11-01 RX ORDER — TAMSULOSIN HYDROCHLORIDE 0.4 MG/1
0.4 CAPSULE ORAL DAILY
Qty: 90 CAPSULE | Refills: 3 | Status: SHIPPED | OUTPATIENT
Start: 2023-11-01

## 2023-11-01 RX ORDER — SOLIFENACIN SUCCINATE 10 MG/1
10 TABLET, FILM COATED ORAL DAILY
Qty: 90 TABLET | Refills: 4 | Status: SHIPPED | OUTPATIENT
Start: 2023-11-01 | End: 2025-01-24

## 2023-11-01 NOTE — PROGRESS NOTES
Dr. Yolanda Weller MD  [facility])  Urology Clinic      Patient:  Omid Johnson  YOB: 1970  Date: 11/1/2023    HISTORY OF PRESENT ILLNESS:   The patient is a 46 y.o. male who presents today for evaluation of the following problem(s): Severe BPH requiring 10 minutes to go. On Flomax and this isnt helping much. We proceeded with TURP and he is doing better but still having some OAB and takes Vesicare for this. He then developed a [address] and this was opened and now his is doing excellent. Overall the problem(s) : are stable. Associated Symptoms: No dysuria, gross hematuria. Pain Severity: 0/10    Summary of old records:   Took flomax. Imaging/Labs reviewed during today's visit:  I have independently reviewed and verified the following films during today's visit. None    Last several PSA's:  No results found for: \"PSA\"    Last total testosterone:  No results found for: \"TESTOSTERONE\"    Urinalysis today:  No results found for this visit on 11/01/23.         Last BUN and creatinine:  Lab Results   Component Value Date    BUN 14 09/12/2023     Lab Results   Component Value Date    CREATININE 1.2 09/12/2023       Imaging Reviewed during this Office Visit:   (results were independently reviewed by physician and radiology report verified)    PAST MEDICAL, FAMILY AND SOCIAL HISTORY:  Past Medical History:   Diagnosis Date    Cerebral artery occlusion with cerebral infarction ([address])     PT STATES TIA    Chronic back pain     DDD (degenerative disc disease), lumbar     Enlarged prostate     Fibromyalgia     Hyperlipidemia     Hypertension     Kidney stones     Lumbago      Past Surgical History:   Procedure Laterality Date    COLONOSCOPY      LITHOTRIPSY      PAIN MANAGEMENT PROCEDURE N/A 1/5/2021    LESI L5 #1 performed by Jessica Newell MD at [address] [facility] N/A 3/26/2021    LESI # 2 @ L4 performed by Jessica Newell MD at [address] [place] [address]

## 2023-11-01 NOTE — PROGRESS NOTES
Patient would like more information on why keeps getting cystoscopy? States flomax is working, only has difficulty now when has procedure done.

## 2023-11-06 RX ORDER — GABAPENTIN 300 MG/1
300 CAPSULE ORAL 3 TIMES DAILY
Qty: 90 CAPSULE | Refills: 0 | Status: SHIPPED | OUTPATIENT
Start: 2023-11-06 | End: 2023-12-06

## 2023-11-06 NOTE — TELEPHONE ENCOUNTER
OARRS reviewed. UDS: + for  Gabapentin, Cyclobenzaprine, Marijuana. Last seen: 9/12/2023.  Follow-up: 12/12/2023

## 2023-12-04 RX ORDER — GABAPENTIN 300 MG/1
300 CAPSULE ORAL 3 TIMES DAILY
Qty: 90 CAPSULE | Refills: 0 | Status: SHIPPED | OUTPATIENT
Start: 2023-12-06 | End: 2024-01-05

## 2023-12-04 NOTE — TELEPHONE ENCOUNTER
OARRS reviewed. UDS: + for  Gabapentin, Cyclobenzaprine, Marijuana/Marinol. Last seen: 9/12/2023.  Follow-up: 12/12/2023

## 2023-12-12 ENCOUNTER — HOSPITAL ENCOUNTER (OUTPATIENT)
Dept: GENERAL RADIOLOGY | Age: 53
Discharge: HOME | End: 2023-12-12
Payer: MEDICAID

## 2023-12-12 ENCOUNTER — HOSPITAL ENCOUNTER (OUTPATIENT)
Age: 53
Discharge: HOME | End: 2023-12-12
Payer: MEDICAID

## 2023-12-12 ENCOUNTER — OFFICE VISIT (OUTPATIENT)
Dept: PHYSICAL MEDICINE AND REHAB | Age: 53
End: 2023-12-12
Payer: MEDICAID

## 2023-12-12 VITALS
SYSTOLIC BLOOD PRESSURE: 112 MMHG | HEIGHT: 69 IN | WEIGHT: 192.9 LBS | BODY MASS INDEX: 28.57 KG/M2 | DIASTOLIC BLOOD PRESSURE: 80 MMHG

## 2023-12-12 DIAGNOSIS — M51.36 ANNULAR TEAR OF LUMBAR DISC: ICD-10-CM

## 2023-12-12 DIAGNOSIS — M54.2 NECK PAIN: ICD-10-CM

## 2023-12-12 DIAGNOSIS — M47.816 LUMBAR SPONDYLOSIS: Primary | ICD-10-CM

## 2023-12-12 DIAGNOSIS — M48.061 LUMBAR FORAMINAL STENOSIS: ICD-10-CM

## 2023-12-12 DIAGNOSIS — M54.9 MID BACK PAIN: ICD-10-CM

## 2023-12-12 DIAGNOSIS — M54.50 LUMBAR PAIN: ICD-10-CM

## 2023-12-12 DIAGNOSIS — M79.604 PAIN IN BOTH LOWER EXTREMITIES: ICD-10-CM

## 2023-12-12 DIAGNOSIS — M79.605 PAIN IN BOTH LOWER EXTREMITIES: ICD-10-CM

## 2023-12-12 DIAGNOSIS — M47.819 FACET ARTHROPATHY OF SPINE: ICD-10-CM

## 2023-12-12 DIAGNOSIS — G89.4 CHRONIC PAIN SYNDROME: ICD-10-CM

## 2023-12-12 PROCEDURE — 3017F COLORECTAL CA SCREEN DOC REV: CPT | Performed by: NURSE PRACTITIONER

## 2023-12-12 PROCEDURE — 72040 X-RAY EXAM NECK SPINE 2-3 VW: CPT

## 2023-12-12 PROCEDURE — 1036F TOBACCO NON-USER: CPT | Performed by: NURSE PRACTITIONER

## 2023-12-12 PROCEDURE — G8427 DOCREV CUR MEDS BY ELIG CLIN: HCPCS | Performed by: NURSE PRACTITIONER

## 2023-12-12 PROCEDURE — G8419 CALC BMI OUT NRM PARAM NOF/U: HCPCS | Performed by: NURSE PRACTITIONER

## 2023-12-12 PROCEDURE — G8484 FLU IMMUNIZE NO ADMIN: HCPCS | Performed by: NURSE PRACTITIONER

## 2023-12-12 PROCEDURE — 99214 OFFICE O/P EST MOD 30 MIN: CPT | Performed by: NURSE PRACTITIONER

## 2023-12-12 PROCEDURE — 72072 X-RAY EXAM THORAC SPINE 3VWS: CPT

## 2023-12-12 RX ORDER — CYCLOBENZAPRINE HCL 5 MG
5 TABLET ORAL 3 TIMES DAILY PRN
Qty: 90 TABLET | Refills: 0 | Status: SHIPPED | OUTPATIENT
Start: 2023-12-12 | End: 2024-01-11

## 2023-12-12 ASSESSMENT — ENCOUNTER SYMPTOMS
COUGH: 0
RESPIRATORY NEGATIVE: 1
WHEEZING: 0
GASTROINTESTINAL NEGATIVE: 1
SHORTNESS OF BREATH: 0
BACK PAIN: 1

## 2023-12-12 NOTE — PROGRESS NOTES
Medications reviewed. Patient denies side effects with medications. Patient states he is taking medications as prescribed. Hedenies receiving pain medications from other sources. He denies any ER visits since last visit. Pain scale with out pain medications or at its worst is 2-3/10. Last dose of Neurontin was today         The patienthas No Known Allergies. Subjective:      Review of Systems   Constitutional: Negative. Respiratory: Negative. Negative for cough, shortness of breath and wheezing. Cardiovascular: Negative. Negative for leg swelling. Gastrointestinal: Negative. Genitourinary:  Negative for difficulty urinating. Musculoskeletal:  Positive for arthralgias, back pain, gait problem, joint swelling, myalgias, neck pain and neck stiffness. No assist devices    Skin: Negative. Neurological:  Positive for numbness. Negative for weakness. Psychiatric/Behavioral:  Positive for agitation and sleep disturbance. Negative for behavioral problems and dysphoric mood. The patient is not nervous/anxious. Objective:     Vitals:    12/12/23 0904   BP: 112/80   Weight: 87.5 kg (192 lb 14.4 oz)   Height: 1.753 m (5' 9.02\")       Physical Exam  Vitals reviewed. Constitutional:       General: He is not in acute distress. Appearance: Normal appearance. He is well-developed. He is not diaphoretic. HENT:      Head: Normocephalic and atraumatic. Not macrocephalic and not microcephalic. Right Ear: External ear normal.      Left Ear: External ear normal.      Mouth/Throat:      Mouth: Mucous membranes are moist.   Eyes:      General:         Right eye: No discharge. Left eye: No discharge. Conjunctiva/sclera: Conjunctivae normal.   Neck:      Trachea: No tracheal deviation. Cardiovascular:      Rate and Rhythm: Normal rate and regular rhythm. Pulses: Normal pulses. Heart sounds: Normal heart sounds.    Pulmonary:      Effort: Pulmonary effort is

## 2023-12-21 NOTE — OP NOTE
Requesting refills to [facility] instead.    LOV 12/7/23  F/U not scheduled     Diltiazem 240 mg   Allergy/contraindication: Diltiazem/ reaction \"incontinence\"   Last given 12/12/23 by Cardology, .    Please advise, thank you.    Lisinopril 40 mg   Last given 9/14/23 #90, 0 refill  Refilled     . This was confirmed with AP and lateral views using fluoroscopy after injecting about 1 ml of Omnipaque-180 and observing the spread of the contrast in the epidural space. Then after negative aspiration a total of 12 mg of dexamethasone with 4 ml of normal saline was injected into the epidural space. The needle is removed and a Band-Aid was placed over the needle insertion site. No paresthesia. EBL-0  Patient's vital signs remained stable and the patient tolerated the procedure well. The patient will be discharged home in stable condition and will be followed in the office in the next few weeks for further planning.     Electronically signed by Meka Pérez MD on 1/5/21 at 10:54 AM EST

## 2023-12-27 RX ORDER — GABAPENTIN 300 MG/1
300 CAPSULE ORAL 3 TIMES DAILY
Qty: 90 CAPSULE | Refills: 0 | Status: SHIPPED | OUTPATIENT
Start: 2024-01-03 | End: 2024-02-02

## 2023-12-27 NOTE — TELEPHONE ENCOUNTER
OARRS reviewed. UDS: + for  Gabapentin, Cyclobenzaprine, Marijuana/Marinol. Last seen: 12/12/2023.  Follow-up: 3/12/2024

## 2024-02-01 RX ORDER — GABAPENTIN 300 MG/1
300 CAPSULE ORAL 3 TIMES DAILY
Qty: 90 CAPSULE | Refills: 0 | Status: SHIPPED | OUTPATIENT
Start: 2024-02-01 | End: 2024-03-02

## 2024-02-01 RX ORDER — CYCLOBENZAPRINE HCL 5 MG
TABLET ORAL
Qty: 90 TABLET | Refills: 0 | Status: SHIPPED | OUTPATIENT
Start: 2024-02-01

## 2024-02-01 NOTE — TELEPHONE ENCOUNTER
OARRS reviewed. UDS: + for  gabapentin. Cyclobenzaprine and marijuana are present.   Last seen: 12/12/2023. Follow-up: 3/12/24

## 2024-02-20 RX ORDER — CYCLOBENZAPRINE HCL 5 MG
TABLET ORAL
Qty: 90 TABLET | Refills: 0 | OUTPATIENT
Start: 2024-02-20

## 2024-03-04 RX ORDER — GABAPENTIN 300 MG/1
300 CAPSULE ORAL 3 TIMES DAILY
Qty: 90 CAPSULE | Refills: 0 | Status: SHIPPED | OUTPATIENT
Start: 2024-03-04 | End: 2024-04-03

## 2024-03-04 RX ORDER — CYCLOBENZAPRINE HCL 5 MG
TABLET ORAL
Qty: 90 TABLET | Refills: 0 | Status: SHIPPED | OUTPATIENT
Start: 2024-03-04

## 2024-03-04 NOTE — TELEPHONE ENCOUNTER
OARRS reviewed. UDS: + for  gabapentin.cyclobenzaprine,marijuana, cotinine are present. Last screen from 6/15/23.  Last seen: 12/12/2023. Follow-up: 4/2/24

## 2024-03-07 RX ORDER — CYCLOBENZAPRINE HCL 5 MG
TABLET ORAL
Qty: 90 TABLET | Refills: 0 | OUTPATIENT
Start: 2024-03-07

## 2024-03-19 NOTE — TELEPHONE ENCOUNTER
Vernon Costello called requesting a refill on the following medications:  Requested Prescriptions     Pending Prescriptions Disp Refills    GEMTESA 75 MG TABS tablet [Pharmacy Med Name: GEMTESA 75 MG TABLET] 30 tablet 3     Sig: TAKE 1 TABLET BY MOUTH EVERY DAY     Pharmacy verified:  [email or web address]      Date of last visit: 11/01/2023  Date of next visit (if applicable): 8/6/2024

## 2024-03-19 NOTE — TELEPHONE ENCOUNTER
Per Dr. Frost's note patient to continue on Myrbetriq and Vesicare. Gemtesa previously too expensive.

## 2024-03-20 RX ORDER — VIBEGRON 75 MG/1
75 TABLET, FILM COATED ORAL DAILY
Qty: 30 TABLET | Refills: 3 | OUTPATIENT
Start: 2024-03-20

## 2024-04-01 ENCOUNTER — TELEPHONE (OUTPATIENT)
Dept: PHYSICAL MEDICINE AND REHAB | Age: 54
End: 2024-04-01

## 2024-04-01 RX ORDER — CYCLOBENZAPRINE HCL 5 MG
TABLET ORAL
Qty: 90 TABLET | Refills: 0 | OUTPATIENT
Start: 2024-04-03 | End: 2024-05-03

## 2024-04-01 NOTE — TELEPHONE ENCOUNTER
Carol Ann is canceling 4/2/24 appt because the patient has a dentist appt. Please contact patient to reschedule appt due to prior canceled appts. Thank you

## 2024-04-01 NOTE — TELEPHONE ENCOUNTER
OARRS reviewed. UDS: + for  Gabapentin, Cyclobenzaprine, Marijuana/Marinol.   Last seen: 12/12/2023. Follow-up: 4/17/2024

## 2024-04-08 RX ORDER — CYCLOBENZAPRINE HCL 5 MG
TABLET ORAL
Qty: 90 TABLET | Refills: 0 | Status: SHIPPED | OUTPATIENT
Start: 2024-04-08

## 2024-04-08 RX ORDER — GABAPENTIN 300 MG/1
300 CAPSULE ORAL 3 TIMES DAILY
Qty: 90 CAPSULE | Refills: 0 | Status: SHIPPED | OUTPATIENT
Start: 2024-04-08 | End: 2024-05-08

## 2024-04-08 NOTE — TELEPHONE ENCOUNTER
OARRS reviewed. UDS: + for  gabapentin. Cyclobenzaprine,marijuana,cotinine is present.  Last seen: 12/12/2023. Follow-up: 4/17/24

## 2024-04-18 ENCOUNTER — OFFICE VISIT (OUTPATIENT)
Dept: PHYSICAL MEDICINE AND REHAB | Age: 54
End: 2024-04-18
Payer: MEDICAID

## 2024-04-18 VITALS
BODY MASS INDEX: 28.44 KG/M2 | SYSTOLIC BLOOD PRESSURE: 126 MMHG | DIASTOLIC BLOOD PRESSURE: 92 MMHG | WEIGHT: 192 LBS | HEIGHT: 69 IN

## 2024-04-18 DIAGNOSIS — M47.816 LUMBAR SPONDYLOSIS: ICD-10-CM

## 2024-04-18 DIAGNOSIS — G89.4 CHRONIC PAIN SYNDROME: ICD-10-CM

## 2024-04-18 DIAGNOSIS — M54.50 LUMBAR PAIN: ICD-10-CM

## 2024-04-18 DIAGNOSIS — G89.29 CHRONIC MYOFASCIAL PAIN: ICD-10-CM

## 2024-04-18 DIAGNOSIS — M47.812 CERVICAL SPONDYLOSIS: Primary | ICD-10-CM

## 2024-04-18 DIAGNOSIS — M54.9 MID BACK PAIN: ICD-10-CM

## 2024-04-18 DIAGNOSIS — M54.2 NECK PAIN: ICD-10-CM

## 2024-04-18 DIAGNOSIS — M54.17 LUMBOSACRAL RADICULITIS: ICD-10-CM

## 2024-04-18 DIAGNOSIS — M51.36 ANNULAR TEAR OF LUMBAR DISC: ICD-10-CM

## 2024-04-18 DIAGNOSIS — M48.061 LUMBAR FORAMINAL STENOSIS: ICD-10-CM

## 2024-04-18 DIAGNOSIS — M47.819 FACET ARTHROPATHY OF SPINE: ICD-10-CM

## 2024-04-18 DIAGNOSIS — M79.18 CHRONIC MYOFASCIAL PAIN: ICD-10-CM

## 2024-04-18 PROCEDURE — G8427 DOCREV CUR MEDS BY ELIG CLIN: HCPCS | Performed by: NURSE PRACTITIONER

## 2024-04-18 PROCEDURE — 99214 OFFICE O/P EST MOD 30 MIN: CPT | Performed by: NURSE PRACTITIONER

## 2024-04-18 PROCEDURE — G8419 CALC BMI OUT NRM PARAM NOF/U: HCPCS | Performed by: NURSE PRACTITIONER

## 2024-04-18 PROCEDURE — 1036F TOBACCO NON-USER: CPT | Performed by: NURSE PRACTITIONER

## 2024-04-18 PROCEDURE — 3017F COLORECTAL CA SCREEN DOC REV: CPT | Performed by: NURSE PRACTITIONER

## 2024-04-18 ASSESSMENT — ENCOUNTER SYMPTOMS
RESPIRATORY NEGATIVE: 1
SHORTNESS OF BREATH: 0
WHEEZING: 0
BACK PAIN: 1
GASTROINTESTINAL NEGATIVE: 1
COUGH: 0

## 2024-04-18 NOTE — PROGRESS NOTES
Position: Sitting   Cuff Size: Large Adult   Weight: 87.1 kg (192 lb)   Height: 1.753 m (5' 9.02\")       Physical Exam  Vitals reviewed.   Constitutional:       General: He is not in acute distress.     Appearance: Normal appearance. He is well-developed. He is not diaphoretic.   HENT:      Head: Normocephalic and atraumatic. Not macrocephalic and not microcephalic.      Right Ear: External ear normal.      Left Ear: External ear normal.      Mouth/Throat:      Mouth: Mucous membranes are moist.   Eyes:      General:         Right eye: No discharge.         Left eye: No discharge.      Conjunctiva/sclera: Conjunctivae normal.   Neck:      Trachea: No tracheal deviation.   Cardiovascular:      Rate and Rhythm: Normal rate and regular rhythm.      Pulses: Normal pulses.      Heart sounds: Normal heart sounds.   Pulmonary:      Effort: Pulmonary effort is normal. No respiratory distress.      Breath sounds: Normal breath sounds.   Abdominal:      General: Abdomen is flat.      Palpations: Abdomen is soft.   Musculoskeletal:         General: Tenderness present.      Cervical back: Rigidity, spasms, tenderness and bony tenderness present. No swelling or edema. Pain with movement and muscular tenderness present. Decreased range of motion.      Thoracic back: Tenderness and bony tenderness present.      Lumbar back: Spasms present. No tenderness or bony tenderness. Normal range of motion. Negative right straight leg raise test and negative left straight leg raise test.        Back:       Right upper leg: Tenderness and bony tenderness present.      Left upper leg: Tenderness and bony tenderness present.      Right lower leg: Tenderness and bony tenderness present.      Left lower leg: Tenderness and bony tenderness present.   Skin:     General: Skin is warm and dry.      Coloration: Skin is not pale.      Findings: No rash.   Neurological:      Mental Status: He is alert and oriented to person, place, and time.

## 2024-05-06 RX ORDER — GABAPENTIN 300 MG/1
300 CAPSULE ORAL 3 TIMES DAILY
Qty: 90 CAPSULE | Refills: 0 | Status: SHIPPED | OUTPATIENT
Start: 2024-05-08 | End: 2024-06-07

## 2024-05-06 RX ORDER — CYCLOBENZAPRINE HCL 5 MG
TABLET ORAL
Qty: 90 TABLET | Refills: 0 | Status: SHIPPED | OUTPATIENT
Start: 2024-05-08 | End: 2024-06-07

## 2024-05-06 RX ORDER — CYCLOBENZAPRINE HCL 5 MG
TABLET ORAL
Qty: 90 TABLET | Refills: 0 | OUTPATIENT
Start: 2024-05-06

## 2024-05-06 NOTE — TELEPHONE ENCOUNTER
OARRS reviewed. UDS: + for  Gabapentin, Cyclobenzaprin, Marijuana. (Old screen 6/2023)  Last seen: 4/18/2024. Follow-up: 6/20/2024

## 2024-05-28 RX ORDER — GABAPENTIN 300 MG/1
300 CAPSULE ORAL 3 TIMES DAILY
Qty: 90 CAPSULE | Refills: 0 | OUTPATIENT
Start: 2024-05-28 | End: 2024-06-27

## 2024-05-28 RX ORDER — CYCLOBENZAPRINE HCL 5 MG
TABLET ORAL
Qty: 90 TABLET | Refills: 0 | OUTPATIENT
Start: 2024-05-28

## 2024-05-28 RX ORDER — GABAPENTIN 300 MG/1
300 CAPSULE ORAL 3 TIMES DAILY
Qty: 90 CAPSULE | Refills: 0 | Status: SHIPPED | OUTPATIENT
Start: 2024-06-05 | End: 2024-07-05

## 2024-05-28 RX ORDER — CYCLOBENZAPRINE HCL 5 MG
TABLET ORAL
Qty: 90 TABLET | Refills: 0 | Status: SHIPPED | OUTPATIENT
Start: 2024-06-07 | End: 2024-06-27

## 2024-05-30 ENCOUNTER — TELEPHONE (OUTPATIENT)
Dept: ADMINISTRATIVE | Age: 54
End: 2024-05-30

## 2024-05-30 NOTE — TELEPHONE ENCOUNTER
Girlfriend Mai Aguayo called today checking on Vernon's Gabapentin it isn't due until  6.5.2024, they are going on vacation and leaving on Tues. 6.4.2024, she is wondering if an exception could be made and it could be called in early on  Mon. 6.3.2024 otherwise he will be without this medication for 2-2 1/2 weeks.  She is listed on his HIPAA form, if you could please get back with her with an answer  they would greatly appreciate it.  The best number to call her at is [phone].

## 2024-05-30 NOTE — TELEPHONE ENCOUNTER
Gabapentin script already signed/sent in with fill date of 6/5. Pt. Going out of town on vacation and asking if ok to get early fill for 6/3 one time.

## 2024-06-25 ENCOUNTER — OFFICE VISIT (OUTPATIENT)
Dept: PHYSICAL MEDICINE AND REHAB | Age: 54
End: 2024-06-25
Payer: MEDICAID

## 2024-06-25 VITALS
BODY MASS INDEX: 28.44 KG/M2 | DIASTOLIC BLOOD PRESSURE: 80 MMHG | SYSTOLIC BLOOD PRESSURE: 126 MMHG | HEIGHT: 69 IN | WEIGHT: 192.02 LBS

## 2024-06-25 DIAGNOSIS — M54.17 LUMBOSACRAL RADICULITIS: Primary | ICD-10-CM

## 2024-06-25 DIAGNOSIS — G89.4 CHRONIC PAIN SYNDROME: ICD-10-CM

## 2024-06-25 DIAGNOSIS — M54.41 CHRONIC MIDLINE LOW BACK PAIN WITH BILATERAL SCIATICA: ICD-10-CM

## 2024-06-25 DIAGNOSIS — M47.812 CERVICAL SPONDYLOSIS: ICD-10-CM

## 2024-06-25 DIAGNOSIS — M51.36 ANNULAR TEAR OF LUMBAR DISC: ICD-10-CM

## 2024-06-25 DIAGNOSIS — M54.42 CHRONIC MIDLINE LOW BACK PAIN WITH BILATERAL SCIATICA: ICD-10-CM

## 2024-06-25 DIAGNOSIS — G89.29 CHRONIC MYOFASCIAL PAIN: ICD-10-CM

## 2024-06-25 DIAGNOSIS — M47.816 LUMBAR SPONDYLOSIS: ICD-10-CM

## 2024-06-25 DIAGNOSIS — G89.29 CHRONIC MIDLINE LOW BACK PAIN WITH BILATERAL SCIATICA: ICD-10-CM

## 2024-06-25 DIAGNOSIS — M47.819 FACET ARTHROPATHY OF SPINE: ICD-10-CM

## 2024-06-25 DIAGNOSIS — M79.18 CHRONIC MYOFASCIAL PAIN: ICD-10-CM

## 2024-06-25 DIAGNOSIS — M48.061 LUMBAR FORAMINAL STENOSIS: ICD-10-CM

## 2024-06-25 DIAGNOSIS — M54.50 LUMBAR PAIN: ICD-10-CM

## 2024-06-25 DIAGNOSIS — M54.2 NECK PAIN: ICD-10-CM

## 2024-06-25 PROCEDURE — G8427 DOCREV CUR MEDS BY ELIG CLIN: HCPCS | Performed by: NURSE PRACTITIONER

## 2024-06-25 PROCEDURE — 1036F TOBACCO NON-USER: CPT | Performed by: NURSE PRACTITIONER

## 2024-06-25 PROCEDURE — 3017F COLORECTAL CA SCREEN DOC REV: CPT | Performed by: NURSE PRACTITIONER

## 2024-06-25 PROCEDURE — 99214 OFFICE O/P EST MOD 30 MIN: CPT | Performed by: NURSE PRACTITIONER

## 2024-06-25 PROCEDURE — G8419 CALC BMI OUT NRM PARAM NOF/U: HCPCS | Performed by: NURSE PRACTITIONER

## 2024-06-25 RX ORDER — GABAPENTIN 300 MG/1
300 CAPSULE ORAL 3 TIMES DAILY
Qty: 90 CAPSULE | Refills: 0 | Status: SHIPPED | OUTPATIENT
Start: 2024-07-03 | End: 2024-08-02

## 2024-06-25 ASSESSMENT — ENCOUNTER SYMPTOMS
COUGH: 0
GASTROINTESTINAL NEGATIVE: 1
RESPIRATORY NEGATIVE: 1
BACK PAIN: 1
SHORTNESS OF BREATH: 0
WHEEZING: 0

## 2024-06-25 NOTE — PROGRESS NOTES
[facility] [place] [facility]  [facility]  [address]  [place] [facility] [address]  Dept: [phone]  Dept Fax: [phone]  Loc: [phone]    Visit Date: 6/25/2024    Functionality Assessment/Goals Worksheet     On a scale of 0 (Does not Interfere) to 10 (Completely Interferes)     1.  Which number describes how during the past week pain has interfered with       the following:  A.  General Activity:  6  B.  Mood: 7  C.  Walking Ability:  8  D.  Normal Work (Includes both work outside the home and housework):  5  E.  Relations with Other People:   6  F.  Sleep:   5  G.  Enjoyment of Life:   4    2.  Patient Prefers to Take their Pain Medications:     [x]  On a regular basis   []  Only when necessary    []  Does not take pain medications    3.  What are the Patient's Goals/Expectations for Visiting Pain Management?     []  Learn about my pain    [x]  Receive Medication   []  Physical Therapy     []  Treat Depression   [x]  Receive Injections    []  Treat Sleep   []  Deal with Anxiety and Stress   []  Treat Opoid Dependence/Addiction   []  Other:        HPI:   Vernon Costello is a 54 y.o. male is here today for    Chief Complaint: Low back pain, neck pain     HPI   2 month follow up and to review therapies. Vernon is here with complaints of pain in low back and neck.   Main complaint today is pain in low back starts in center aching pain radiating down bilateral legs posteriorly to back of knees burning numbness and has numbness and tingling down into feet and toes. Pain is most bothersome in legs at this time compared to low back. States pain in low back is not that bad. States that this has worsened over the past month     States that neck pain has been more tolerable since therapy. Reviewed notes with patient and he has improved ROM. Pain is in posterior neck radiates across shoulder blades burning pain      Continues Neurontin, flexeril prn

## 2024-07-15 RX ORDER — CYCLOBENZAPRINE HCL 5 MG
TABLET ORAL
Qty: 90 TABLET | Refills: 0 | Status: SHIPPED | OUTPATIENT
Start: 2024-07-15 | End: 2024-08-14

## 2024-07-17 ENCOUNTER — HOSPITAL ENCOUNTER (OUTPATIENT)
Dept: MRI IMAGING | Age: 54
Discharge: HOME | End: 2024-07-17
Payer: MEDICAID

## 2024-07-17 DIAGNOSIS — M54.42 CHRONIC MIDLINE LOW BACK PAIN WITH BILATERAL SCIATICA: ICD-10-CM

## 2024-07-17 DIAGNOSIS — G89.29 CHRONIC MIDLINE LOW BACK PAIN WITH BILATERAL SCIATICA: ICD-10-CM

## 2024-07-17 DIAGNOSIS — M54.17 LUMBOSACRAL RADICULITIS: ICD-10-CM

## 2024-07-17 DIAGNOSIS — M48.061 LUMBAR FORAMINAL STENOSIS: ICD-10-CM

## 2024-07-17 DIAGNOSIS — M54.41 CHRONIC MIDLINE LOW BACK PAIN WITH BILATERAL SCIATICA: ICD-10-CM

## 2024-07-17 DIAGNOSIS — M47.816 LUMBAR SPONDYLOSIS: ICD-10-CM

## 2024-07-17 DIAGNOSIS — M51.36 ANNULAR TEAR OF LUMBAR DISC: ICD-10-CM

## 2024-07-17 DIAGNOSIS — M54.50 LUMBAR PAIN: ICD-10-CM

## 2024-07-17 PROCEDURE — 72148 MRI LUMBAR SPINE W/O DYE: CPT

## 2024-07-25 ENCOUNTER — OFFICE VISIT (OUTPATIENT)
Dept: PHYSICAL MEDICINE AND REHAB | Age: 54
End: 2024-07-25
Payer: MEDICAID

## 2024-07-25 ENCOUNTER — TELEPHONE (OUTPATIENT)
Dept: PHYSICAL MEDICINE AND REHAB | Age: 54
End: 2024-07-25

## 2024-07-25 VITALS — SYSTOLIC BLOOD PRESSURE: 110 MMHG | DIASTOLIC BLOOD PRESSURE: 70 MMHG

## 2024-07-25 DIAGNOSIS — M79.18 CHRONIC MYOFASCIAL PAIN: ICD-10-CM

## 2024-07-25 DIAGNOSIS — M47.819 FACET ARTHROPATHY OF SPINE: ICD-10-CM

## 2024-07-25 DIAGNOSIS — M47.812 CERVICAL SPONDYLOSIS: ICD-10-CM

## 2024-07-25 DIAGNOSIS — M47.816 LUMBAR SPONDYLOSIS: Primary | ICD-10-CM

## 2024-07-25 DIAGNOSIS — M54.2 NECK PAIN: ICD-10-CM

## 2024-07-25 DIAGNOSIS — G89.29 CHRONIC MYOFASCIAL PAIN: ICD-10-CM

## 2024-07-25 DIAGNOSIS — M54.17 LUMBOSACRAL RADICULITIS: ICD-10-CM

## 2024-07-25 DIAGNOSIS — G89.4 CHRONIC PAIN SYNDROME: ICD-10-CM

## 2024-07-25 DIAGNOSIS — M54.50 LUMBAR PAIN: ICD-10-CM

## 2024-07-25 DIAGNOSIS — M51.36 ANNULAR TEAR OF LUMBAR DISC: ICD-10-CM

## 2024-07-25 DIAGNOSIS — M48.061 LUMBAR FORAMINAL STENOSIS: ICD-10-CM

## 2024-07-25 PROCEDURE — 99214 OFFICE O/P EST MOD 30 MIN: CPT | Performed by: NURSE PRACTITIONER

## 2024-07-25 PROCEDURE — G8427 DOCREV CUR MEDS BY ELIG CLIN: HCPCS | Performed by: NURSE PRACTITIONER

## 2024-07-25 PROCEDURE — G8419 CALC BMI OUT NRM PARAM NOF/U: HCPCS | Performed by: NURSE PRACTITIONER

## 2024-07-25 PROCEDURE — 1036F TOBACCO NON-USER: CPT | Performed by: NURSE PRACTITIONER

## 2024-07-25 PROCEDURE — 3017F COLORECTAL CA SCREEN DOC REV: CPT | Performed by: NURSE PRACTITIONER

## 2024-07-25 ASSESSMENT — ENCOUNTER SYMPTOMS
WHEEZING: 0
RESPIRATORY NEGATIVE: 1
SHORTNESS OF BREATH: 0
GASTROINTESTINAL NEGATIVE: 1
COUGH: 0
BACK PAIN: 1

## 2024-07-25 NOTE — PROGRESS NOTES
bony tenderness present.      Right lower leg: Tenderness and bony tenderness present.      Left lower leg: Tenderness and bony tenderness present.   Skin:     General: Skin is warm and dry.      Coloration: Skin is not pale.      Findings: No rash.   Neurological:      Mental Status: He is alert and oriented to person, place, and time.      Cranial Nerves: No cranial nerve deficit.      Sensory: Sensory deficit present.      Motor: Weakness present.      Coordination: Coordination abnormal.      Gait: Gait abnormal.      Comments: 4/5 strength left lower extremity, 3-4 right lower, 4/5 bilateral upper extremities      Psychiatric:         Attention and Perception: He is attentive.         Speech: Speech normal.         Behavior: Behavior normal.         Thought Content: Thought content normal.         Judgment: Judgment normal.       GUMARO  Patricks test  positive  Yeoman's  or Gaenslen's positive       Assessment:     1. Lumbar spondylosis    2. Facet arthropathy of spine    3. Lumbosacral radiculitis    4. Lumbar foraminal stenosis    5. Annular tear of lumbar disc    6. Lumbar pain    7. Cervical spondylosis    8. Neck pain    9. Chronic myofascial pain    10. Chronic pain syndrome         Assessment & Plan   Plan:      OARRS reviewed. Current MED: 0  Patient was not offered naloxone for home.   Discussed long term side effects of medications, tolerance, dependency and addiction.  Previous UDS reviewed  UDS preformed today for compliance.  Patient told can not receive any pain medications from any other source.  No evidence of abuse, diversion or aberrant behavior.  Medications and/or procedures to improve function and quality of life- patient understanding with this and that may not be pain free  Discussed with patient about safe storage of medications at home  Discussed possible weaning of medication dosing dependent on treatment/procedure results.   Discussed with patient about risks with procedure including

## 2024-08-05 RX ORDER — GABAPENTIN 300 MG/1
300 CAPSULE ORAL 3 TIMES DAILY
Qty: 90 CAPSULE | Refills: 0 | Status: SHIPPED | OUTPATIENT
Start: 2024-08-05 | End: 2024-09-04

## 2024-08-07 ENCOUNTER — HOSPITAL ENCOUNTER (OUTPATIENT)
Dept: MRI IMAGING | Age: 54
Discharge: HOME | End: 2024-08-07
Attending: OPHTHALMOLOGY
Payer: MEDICAID

## 2024-08-07 DIAGNOSIS — H46.11 RETROBULBAR NEURITIS OF RIGHT EYE: ICD-10-CM

## 2024-08-07 PROCEDURE — A9579 GAD-BASE MR CONTRAST NOS,1ML: HCPCS | Performed by: OPHTHALMOLOGY

## 2024-08-07 PROCEDURE — 6360000004 HC RX CONTRAST MEDICATION: Performed by: OPHTHALMOLOGY

## 2024-08-07 PROCEDURE — 70543 MRI ORBT/FAC/NCK W/O &W/DYE: CPT

## 2024-08-07 PROCEDURE — 70553 MRI BRAIN STEM W/O & W/DYE: CPT

## 2024-08-07 RX ADMIN — GADOTERIDOL 20 ML: 279.3 INJECTION, SOLUTION INTRAVENOUS at 15:16

## 2024-08-16 RX ORDER — CYCLOBENZAPRINE HCL 5 MG
TABLET ORAL
Qty: 90 TABLET | Refills: 0 | Status: SHIPPED | OUTPATIENT
Start: 2024-08-16

## 2024-08-19 ENCOUNTER — HOSPITAL ENCOUNTER (OUTPATIENT)
Age: 54
Setting detail: OUTPATIENT SURGERY
Discharge: HOME | End: 2024-08-19
Attending: PAIN MEDICINE | Admitting: PAIN MEDICINE
Payer: MEDICAID

## 2024-08-19 ENCOUNTER — APPOINTMENT (OUTPATIENT)
Dept: GENERAL RADIOLOGY | Age: 54
End: 2024-08-19
Attending: PAIN MEDICINE
Payer: MEDICAID

## 2024-08-19 VITALS
SYSTOLIC BLOOD PRESSURE: 127 MMHG | WEIGHT: 189 LBS | DIASTOLIC BLOOD PRESSURE: 86 MMHG | BODY MASS INDEX: 26.46 KG/M2 | HEIGHT: 71 IN | RESPIRATION RATE: 14 BRPM | HEART RATE: 64 BPM | TEMPERATURE: 97.9 F | OXYGEN SATURATION: 95 %

## 2024-08-19 PROCEDURE — 64635 DESTROY LUMB/SAC FACET JNT: CPT | Performed by: PAIN MEDICINE

## 2024-08-19 PROCEDURE — 99152 MOD SED SAME PHYS/QHP 5/>YRS: CPT | Performed by: PAIN MEDICINE

## 2024-08-19 PROCEDURE — 7100000010 HC PHASE II RECOVERY - FIRST 15 MIN: Performed by: PAIN MEDICINE

## 2024-08-19 PROCEDURE — 64636 DESTROY L/S FACET JNT ADDL: CPT | Performed by: PAIN MEDICINE

## 2024-08-19 PROCEDURE — 3600000056 HC PAIN LEVEL 4 BASE: Performed by: PAIN MEDICINE

## 2024-08-19 PROCEDURE — 7100000011 HC PHASE II RECOVERY - ADDTL 15 MIN: Performed by: PAIN MEDICINE

## 2024-08-19 PROCEDURE — 2709999900 HC NON-CHARGEABLE SUPPLY: Performed by: PAIN MEDICINE

## 2024-08-19 PROCEDURE — 6360000002 HC RX W HCPCS: Performed by: PAIN MEDICINE

## 2024-08-19 PROCEDURE — 3600000057 HC PAIN LEVEL 4 ADDL 15 MIN: Performed by: PAIN MEDICINE

## 2024-08-19 PROCEDURE — 2500000003 HC RX 250 WO HCPCS: Performed by: PAIN MEDICINE

## 2024-08-19 RX ORDER — BUPIVACAINE HYDROCHLORIDE 5 MG/ML
INJECTION, SOLUTION PERINEURAL PRN
Status: DISCONTINUED | OUTPATIENT
Start: 2024-08-19 | End: 2024-08-19 | Stop reason: ALTCHOICE

## 2024-08-19 RX ORDER — LIDOCAINE HYDROCHLORIDE 20 MG/ML
INJECTION, SOLUTION INFILTRATION; PERINEURAL PRN
Status: DISCONTINUED | OUTPATIENT
Start: 2024-08-19 | End: 2024-08-19 | Stop reason: ALTCHOICE

## 2024-08-19 RX ORDER — MIDAZOLAM HYDROCHLORIDE 1 MG/ML
INJECTION INTRAMUSCULAR; INTRAVENOUS PRN
Status: DISCONTINUED | OUTPATIENT
Start: 2024-08-19 | End: 2024-08-19 | Stop reason: ALTCHOICE

## 2024-08-19 RX ORDER — FENTANYL CITRATE 50 UG/ML
INJECTION, SOLUTION INTRAMUSCULAR; INTRAVENOUS PRN
Status: DISCONTINUED | OUTPATIENT
Start: 2024-08-19 | End: 2024-08-19 | Stop reason: ALTCHOICE

## 2024-08-19 ASSESSMENT — PAIN - FUNCTIONAL ASSESSMENT
PAIN_FUNCTIONAL_ASSESSMENT: 0-10
PAIN_FUNCTIONAL_ASSESSMENT: 0-10

## 2024-08-19 ASSESSMENT — PAIN DESCRIPTION - DESCRIPTORS: DESCRIPTORS: ACHING

## 2024-08-19 NOTE — H&P
H&P    Patient continues to have pain in low back which starts in center and is Constant dull aching pain.  Denies any radicular pain at this time states improved since last visit. Pain is all axial. Does get intermittent pain down posterior legs and numbness and tingling- in toes but again not having this today      Denies any neck pain today. This has been more intermittent      Has MRI scheduled of orbits and face and brain in August d/t vision changes and memory issues.         Continues Neurontin, flexeril prn and THC which helps. Takes tylenol prn   Pain increases with bending, lifting, twisting , walking, standing, getting up and down, and housework or working at job.        Prior Injections:  bilateral TFLESI 11/28/2022 80% relief for about a year      Radiology:  Lumbar MRI:   FINDINGS:        The lumbar vertebral bodies are normally aligned.  There is normal marrow signal  throughout.  There is no bone marrow edema.  There are no compression fractures.   No pars defects are noted. There is stable isolated disc desiccation at the  L5-S1 level.     The distal spinal cord, conus medullaris and cauda equina are normal.      There are no gross abnormalities in the visualized aspects of the distal  thoracic spine.     On the axial images, at T12-L1 through L3-L4, there are no degenerative changes.  There is no spinal canal or foraminal stenosis.     At L4-L5, there are mild facet degenerative changes. The disc is normal. There  is no spinal canal stenosis. There is no foraminal stenosis.     At L5-S1, there is a stable small annular tear to the disc in the left and right  central locations. These were present previously and are not significantly  changed. There is a minimal bulge to the disc. There is no stenosis of the  thecal sac. There is no foraminal stenosis.     There are no suspicious findings in the visualized aspects of the  retroperitoneum and paraspinal soft tissues.        IMPRESSION:     1. Stable

## 2024-08-19 NOTE — PROGRESS NOTES
1320- Patient arrived to Phase II via bed, report from Angelina WONG. Patient awake and alert without complaints. VSS - site WNL.. Drink and snack provided, call light in reach. Positioned for comfort.  1335- IV removed, patient sat edge of bed - tolerated well. Patient dressed.  1340 - Patient discharged ambulatory to private vehicle with friend.

## 2024-08-19 NOTE — PRE SEDATION
Speech normal.         Behavior: Behavior normal.         Thought Content: Thought content normal.         Judgment: Judgment normal.         GUMARO  Patricks test  positive  Yeoman's  or Gaenslen's positive        Assessment  Assessment:      1. Lumbar spondylosis    2. Facet arthropathy of spine    3. Lumbosacral radiculitis    4. Lumbar foraminal stenosis    5. Annular tear of lumbar disc    6. Lumbar pain    7. Cervical spondylosis    8. Neck pain    9. Chronic myofascial pain    10. Chronic pain syndrome                Assessment & Plan  Plan:      OARRS reviewed. Current MED: 0  Patient was not offered naloxone for home.   Discussed long term side effects of medications, tolerance, dependency and addiction.  Previous UDS reviewed  UDS preformed today for compliance.  Patient told can not receive any pain medications from any other source.  No evidence of abuse, diversion or aberrant behavior.  Medications and/or procedures to improve function and quality of life- patient understanding with this and that may not be pain free  Discussed with patient about safe storage of medications at home  Discussed possible weaning of medication dosing dependent on treatment/procedure results.   Discussed with patient about risks with procedure including infection, reaction to medication, increased pain, or bleeding.  Reviewed Lumbar MRI in detail with patient and his wife  Radicular pain improved since last visit . Pain is all axial at this time.   Plan Bilateral L-facet RFA at L3-4 and L4-5 under fluoroscopy for longer term pain relief. Procedure and risks discussed in detail. Received over 85% relief from previous L-facet RFA for over 8 months   Neck pain is improved. Discussed bilateral C-facet MBB # 1 @ C5-6 and C6-7 for diagnostic purpose, discussed trigger point injections. If needed   Continue Neurontin 600 mg in am and  300 at bedtime- Still has some states over 10 days left.   Continue flexeril 5 mg TID prn - filled

## 2024-08-19 NOTE — OP NOTE
Pre-Procedure Note    Patient Name: Vernon Costello   YOB: 1970  Medical Record Number: [id number]  Date: 8/19/24    Indication: Lower back pain    Consent: On file.    Vital Signs:   Vitals:    08/19/24 1300   BP: 97/68   Pulse: 60   Resp: (!) 64   Temp:    SpO2: 92%       Past Medical History:   has a past medical history of Cerebral artery occlusion with cerebral infarction (HCC), Chronic back pain, DDD (degenerative disc disease), lumbar, Enlarged prostate, Fibromyalgia, Hyperlipidemia, Hypertension, Kidney stones, and Lumbago.    Past Surgical History:   has a past surgical history that includes rhinoplasty (2017); Colonoscopy; Lithotripsy; Pain management procedure (N/A, 1/5/2021); Pain management procedure (N/A, 3/26/2021); Pain management procedure (Bilateral, 5/3/2021); TURP (N/A, 5/26/2021); TURP (N/A, 11/10/2021); Pain management procedure (Bilateral, 4/26/2022); Pain management procedure (Bilateral, 7/5/2022); and Pain management procedure (Bilateral, 11/28/2022).    Pre-Sedation Documentation and Exam:   Vital signs have been reviewed (see flow sheet for vitals).     Sedation/ Anesthesia Plan:   IV sedation    Patient is an appropriate candidate for plan of sedation: yes      Preoperative Diagnosis:  L-spondylosis     Post-Op Dx: as above     Procedure Performed:  :Radiofrequency ablation of median branches at the levels of L3-4 and L4-5 bilateral under fluoroscopic guidance      Indication for the Procedure:  The patient has ahistory of chronic low back pain that is not responding well to the conservative treatment.  Patient's pain is mostly axial in nature.  Pain is interfering with the activities of daily living.  Physical examination revealed facet tenderness and facet loading is positive.  Patient had undergone lumbar facet joint injections with pain relief that lasted for only a short period of time and had greater than 70% pain relief with confirmatory median branch blocks.  Hence we

## 2024-08-19 NOTE — POST SEDATION
IV sedation was used during the procedure:  - Moderate intravenous conscious sedation was supervised by Dr. Evans  - The patient was independently monitored by a Registered Nurse assigned to the procedure room  - Monitoring included automated blood pressure, continuous EKG, and continuous pulse oximetry  - The detailed conscious record is permanently stored in the Hospital Information System  - The following is the conscious sedation record:  Start Time: 1258  End Time : 1319  Duration: 15 minutes   Medications Administered: 1 mg Versed, 50 mcg Fentanyl  Complications:  EBL-0

## 2024-08-19 NOTE — PROGRESS NOTES
Discharge instructions reviewed with patient and patient's domestic partner, Carol Ann. Questions answered and AVS provided for reference.

## 2024-08-29 RX ORDER — GABAPENTIN 300 MG/1
300 CAPSULE ORAL 3 TIMES DAILY
Qty: 90 CAPSULE | Refills: 0 | OUTPATIENT
Start: 2024-08-29 | End: 2024-09-28

## 2024-08-29 RX ORDER — CYCLOBENZAPRINE HCL 5 MG
TABLET ORAL
Qty: 90 TABLET | Refills: 0 | OUTPATIENT
Start: 2024-08-29

## 2024-09-04 NOTE — PROGRESS NOTES
[facility] [place] [facility]  [facility]  [address]  [address]  [facility] [address]  Dept: [phone]  Loc: [phone]    Visit Date: 9/5/2024        HPI:     HPI  Mr. Costello is a 54-year-old male that presents in follow-up.    Failed Viagra and Cialis for management of ED.     He does endorse severe BPH and lower urinary tract symptoms. C/o urinary urgency and urinary frequency. He ultimately underwent a cystoscopy, TURP in 5/2021. His post-operative course was complicated by the development of a bladder neck contracture. This was treated via a cystoscopy, TURP, transurethral incision of bladder neck contracture with Dr. Frost in 11/2021. OAB symptomatology persisted, though. Mr. Costello continues to take Vesicare 10 mg daily. This has reduced nocturia from 3-4x to 1 x per night. Gemtesa was cost prohibitive.     Current Outpatient Medications   Medication Sig Dispense Refill    cyclobenzaprine (FLEXERIL) 5 MG tablet TAKE 1 TABLET BY MOUTH THREE TIMES A DAY AS NEEDED FOR MUSCLE SPASM 90 tablet 0    diclofenac sodium (VOLTAREN) 1 % GEL APPLY 4 GRAMS TOPICALLY 4 TIMES DAILY AS NEEDED FOR PAIN (TO PAINFUL AREAS ON NECK AND BACK) 100 g 1    lisinopril (PRINIVIL;ZESTRIL) 10 MG tablet Take 1 tablet by mouth daily      DULoxetine (CYMBALTA) 60 MG extended release capsule TAKE 1 CAPSULE BY MOUTH EVERY DAY      DULoxetine (CYMBALTA) 30 MG extended release capsule 2 times daily Pt takes 60mg in am and 30mg in the pm      atorvastatin (LIPITOR) 20 MG tablet Take 1 tablet by mouth daily      traZODone (DESYREL) 50 MG tablet Take 1 tablet by mouth nightly as needed      gabapentin (NEURONTIN) 300 MG capsule Take 1 capsule by mouth 3 times daily for 30 days. 90 capsule 0    tamsulosin (FLOMAX) 0.4 MG capsule Take 1 capsule by mouth daily 90 capsule 2    solifenacin (VESICARE) 10 MG tablet Take 1 tablet by mouth daily 90 tablet 3     No current facility-administered medications for this visit.

## 2024-09-05 ENCOUNTER — OFFICE VISIT (OUTPATIENT)
Dept: UROLOGY | Age: 54
End: 2024-09-05
Payer: MEDICAID

## 2024-09-05 ENCOUNTER — HOSPITAL ENCOUNTER (OUTPATIENT)
Age: 54
Discharge: HOME | End: 2024-09-05
Payer: MEDICAID

## 2024-09-05 VITALS — WEIGHT: 192 LBS | RESPIRATION RATE: 18 BRPM | HEIGHT: 71 IN | BODY MASS INDEX: 26.88 KG/M2

## 2024-09-05 DIAGNOSIS — Z12.5 SCREENING PSA (PROSTATE SPECIFIC ANTIGEN): ICD-10-CM

## 2024-09-05 DIAGNOSIS — N32.81 OAB (OVERACTIVE BLADDER): ICD-10-CM

## 2024-09-05 DIAGNOSIS — N32.0 BLADDER NECK CONTRACTURE: Primary | ICD-10-CM

## 2024-09-05 LAB
POST VOID RESIDUAL (PVR): 87 ML
PSA SERPL-MCNC: 1.28 NG/ML (ref 0–1)

## 2024-09-05 PROCEDURE — G0103 PSA SCREENING: HCPCS

## 2024-09-05 PROCEDURE — 51798 US URINE CAPACITY MEASURE: CPT

## 2024-09-05 PROCEDURE — G8419 CALC BMI OUT NRM PARAM NOF/U: HCPCS

## 2024-09-05 PROCEDURE — G8427 DOCREV CUR MEDS BY ELIG CLIN: HCPCS

## 2024-09-05 PROCEDURE — 36415 COLL VENOUS BLD VENIPUNCTURE: CPT

## 2024-09-05 PROCEDURE — 3017F COLORECTAL CA SCREEN DOC REV: CPT

## 2024-09-05 PROCEDURE — 99214 OFFICE O/P EST MOD 30 MIN: CPT

## 2024-09-05 PROCEDURE — 1036F TOBACCO NON-USER: CPT

## 2024-09-05 RX ORDER — SOLIFENACIN SUCCINATE 10 MG/1
10 TABLET, FILM COATED ORAL DAILY
Qty: 90 TABLET | Refills: 3 | Status: SHIPPED | OUTPATIENT
Start: 2024-09-05 | End: 2025-08-31

## 2024-09-05 NOTE — PATIENT INSTRUCTIONS
Get PSA   Continue Vesicare  Follow-up in 6 months or sooner if needed   Call with questions, comments, or concerns. I recommend going to the ED for further evaluation if you develop fever, chills, nausea, vomiting, chest pain, SOB, or calf pain.    
0 = swallows foods/liquids without difficulty

## 2024-09-09 ENCOUNTER — OFFICE VISIT (OUTPATIENT)
Dept: PHYSICAL MEDICINE AND REHAB | Age: 54
End: 2024-09-09
Payer: MEDICAID

## 2024-09-09 VITALS
WEIGHT: 192.02 LBS | DIASTOLIC BLOOD PRESSURE: 78 MMHG | BODY MASS INDEX: 26.88 KG/M2 | SYSTOLIC BLOOD PRESSURE: 120 MMHG | HEIGHT: 71 IN

## 2024-09-09 DIAGNOSIS — M79.18 CHRONIC MYOFASCIAL PAIN: ICD-10-CM

## 2024-09-09 DIAGNOSIS — M47.816 LUMBAR SPONDYLOSIS: ICD-10-CM

## 2024-09-09 DIAGNOSIS — M47.812 CERVICAL SPONDYLOSIS: ICD-10-CM

## 2024-09-09 DIAGNOSIS — G89.4 CHRONIC PAIN SYNDROME: ICD-10-CM

## 2024-09-09 DIAGNOSIS — M54.2 NECK PAIN: ICD-10-CM

## 2024-09-09 DIAGNOSIS — G89.29 CHRONIC MYOFASCIAL PAIN: ICD-10-CM

## 2024-09-09 DIAGNOSIS — M54.50 LUMBAR PAIN: ICD-10-CM

## 2024-09-09 DIAGNOSIS — M51.36 ANNULAR TEAR OF LUMBAR DISC: ICD-10-CM

## 2024-09-09 DIAGNOSIS — M54.17 LUMBOSACRAL RADICULITIS: Primary | ICD-10-CM

## 2024-09-09 DIAGNOSIS — M48.061 LUMBAR FORAMINAL STENOSIS: ICD-10-CM

## 2024-09-09 DIAGNOSIS — M47.819 FACET ARTHROPATHY OF SPINE: ICD-10-CM

## 2024-09-09 PROCEDURE — 99214 OFFICE O/P EST MOD 30 MIN: CPT | Performed by: NURSE PRACTITIONER

## 2024-09-09 PROCEDURE — 1036F TOBACCO NON-USER: CPT | Performed by: NURSE PRACTITIONER

## 2024-09-09 PROCEDURE — G8419 CALC BMI OUT NRM PARAM NOF/U: HCPCS | Performed by: NURSE PRACTITIONER

## 2024-09-09 PROCEDURE — 3017F COLORECTAL CA SCREEN DOC REV: CPT | Performed by: NURSE PRACTITIONER

## 2024-09-09 PROCEDURE — G8427 DOCREV CUR MEDS BY ELIG CLIN: HCPCS | Performed by: NURSE PRACTITIONER

## 2024-09-09 RX ORDER — GABAPENTIN 300 MG/1
300 CAPSULE ORAL 3 TIMES DAILY
Qty: 90 CAPSULE | Refills: 0 | Status: SHIPPED | OUTPATIENT
Start: 2024-09-09 | End: 2024-10-09

## 2024-09-09 ASSESSMENT — ENCOUNTER SYMPTOMS
COUGH: 0
RESPIRATORY NEGATIVE: 1
WHEEZING: 0
GASTROINTESTINAL NEGATIVE: 1
BACK PAIN: 1
SHORTNESS OF BREATH: 0

## 2024-09-19 RX ORDER — CYCLOBENZAPRINE HCL 5 MG
TABLET ORAL
Qty: 90 TABLET | Refills: 0 | Status: SHIPPED | OUTPATIENT
Start: 2024-09-19

## 2024-10-07 RX ORDER — GABAPENTIN 300 MG/1
300 CAPSULE ORAL 3 TIMES DAILY
Qty: 90 CAPSULE | Refills: 0 | Status: SHIPPED | OUTPATIENT
Start: 2024-10-09 | End: 2024-11-08

## 2024-10-09 ENCOUNTER — OFFICE VISIT (OUTPATIENT)
Dept: PHYSICAL MEDICINE AND REHAB | Age: 54
End: 2024-10-09
Payer: MEDICAID

## 2024-10-09 VITALS
DIASTOLIC BLOOD PRESSURE: 76 MMHG | BODY MASS INDEX: 26.88 KG/M2 | HEIGHT: 71 IN | SYSTOLIC BLOOD PRESSURE: 114 MMHG | WEIGHT: 192.02 LBS

## 2024-10-09 DIAGNOSIS — M47.812 CERVICAL SPONDYLOSIS: Primary | ICD-10-CM

## 2024-10-09 DIAGNOSIS — M48.061 LUMBAR FORAMINAL STENOSIS: ICD-10-CM

## 2024-10-09 DIAGNOSIS — M47.816 LUMBAR SPONDYLOSIS: ICD-10-CM

## 2024-10-09 DIAGNOSIS — M54.17 LUMBOSACRAL RADICULITIS: ICD-10-CM

## 2024-10-09 DIAGNOSIS — G89.4 CHRONIC PAIN SYNDROME: ICD-10-CM

## 2024-10-09 DIAGNOSIS — G89.29 CHRONIC MIDLINE LOW BACK PAIN WITH BILATERAL SCIATICA: ICD-10-CM

## 2024-10-09 DIAGNOSIS — M54.42 CHRONIC MIDLINE LOW BACK PAIN WITH BILATERAL SCIATICA: ICD-10-CM

## 2024-10-09 DIAGNOSIS — M51.369 ANNULAR TEAR OF LUMBAR DISC: ICD-10-CM

## 2024-10-09 DIAGNOSIS — M54.2 NECK PAIN: ICD-10-CM

## 2024-10-09 DIAGNOSIS — M54.41 CHRONIC MIDLINE LOW BACK PAIN WITH BILATERAL SCIATICA: ICD-10-CM

## 2024-10-09 PROCEDURE — G8428 CUR MEDS NOT DOCUMENT: HCPCS | Performed by: NURSE PRACTITIONER

## 2024-10-09 PROCEDURE — 1036F TOBACCO NON-USER: CPT | Performed by: NURSE PRACTITIONER

## 2024-10-09 PROCEDURE — G8484 FLU IMMUNIZE NO ADMIN: HCPCS | Performed by: NURSE PRACTITIONER

## 2024-10-09 PROCEDURE — 3017F COLORECTAL CA SCREEN DOC REV: CPT | Performed by: NURSE PRACTITIONER

## 2024-10-09 PROCEDURE — G8419 CALC BMI OUT NRM PARAM NOF/U: HCPCS | Performed by: NURSE PRACTITIONER

## 2024-10-09 PROCEDURE — 99214 OFFICE O/P EST MOD 30 MIN: CPT | Performed by: NURSE PRACTITIONER

## 2024-10-09 ASSESSMENT — ENCOUNTER SYMPTOMS
RESPIRATORY NEGATIVE: 1
SHORTNESS OF BREATH: 0
WHEEZING: 0
GASTROINTESTINAL NEGATIVE: 1
COUGH: 0
BACK PAIN: 1

## 2024-10-09 NOTE — PROGRESS NOTES
Judgment normal.       GUMARO  Patricks test  positive  Yeoman's  or Gaenslen's positive         Assessment:     1. Cervical spondylosis    2. Neck pain    3. Lumbosacral radiculitis    4. Lumbar foraminal stenosis    5. Annular tear of lumbar disc    6. Lumbar spondylosis    7. Chronic midline low back pain with bilateral sciatica    8. Chronic pain syndrome         Assessment & Plan   Plan:      OARRS reviewed. Current MED: 0  Patient was not offered naloxone for home.   Discussed long term side effects of medications, tolerance, dependency and addiction.  Previous UDS reviewed  UDS preformed today for compliance.  Patient told can not receive any pain medications from any other source.  No evidence of abuse, diversion or aberrant behavior.  Medications and/or procedures to improve function and quality of life- patient understanding with this and that may not be pain free  Discussed with patient about safe storage of medications at home  Discussed possible weaning of medication dosing dependent on treatment/procedure results.   Discussed with patient about risks with procedure including infection, reaction to medication, increased pain, or bleeding.  Procedure notes reviewed in detail Is now receiving 80% relief of low back pain from bilateral L-facet RFA.   Discussed repeating TFLESI in future  when needing to. Radicular pain has bene intermittent   Plan bilateral Cervical facet medial branch block # 1 @ C5-6 and C6-7 under fluoroscopy for diagnostic purpose. Procedure discussed   Discussed possible trigger point injections in the future   Continue Neurontin 300 mg in am and 600 at bedtime-  filled 10/7/2024. Thsi could be titrated if needed   Continue flexeril 5 mg TID prn - filled 9/19/2024 and still has plenty   Continue Voltaren gel QID prn- to painful areas on back and neck   Uses THC     Meds. Prescribed:   No orders of the defined types were placed in this encounter.      Return for bilateral Cervical facet

## 2024-10-15 RX ORDER — CYCLOBENZAPRINE HCL 5 MG
TABLET ORAL
Qty: 90 TABLET | Refills: 0 | Status: SHIPPED | OUTPATIENT
Start: 2024-10-19

## 2024-10-29 RX ORDER — CYCLOBENZAPRINE HCL 5 MG
TABLET ORAL
Qty: 90 TABLET | Refills: 0 | OUTPATIENT
Start: 2024-10-29

## 2024-11-04 RX ORDER — GABAPENTIN 300 MG/1
300 CAPSULE ORAL 3 TIMES DAILY
Qty: 90 CAPSULE | Refills: 0 | Status: SHIPPED | OUTPATIENT
Start: 2024-11-06 | End: 2024-12-06

## 2024-11-04 ASSESSMENT — ENCOUNTER SYMPTOMS
GASTROINTESTINAL NEGATIVE: 1
WHEEZING: 0
COUGH: 0
BACK PAIN: 1
RESPIRATORY NEGATIVE: 1
SHORTNESS OF BREATH: 0

## 2024-11-04 NOTE — H&P
Discussed repeating TFLESI in future  when needing to. Radicular pain has bene intermittent   Plan bilateral Cervical facet medial branch block # 1 @ C5-6 and C6-7 under fluoroscopy for diagnostic purpose. Procedure discussed   Discussed possible trigger point injections in the future   Continue Neurontin 300 mg in am and 600 at bedtime-  filled 10/7/2024. Thsi could be titrated if needed   Continue flexeril 5 mg TID prn - filled 9/19/2024 and still has plenty   Continue Voltaren gel QID prn- to painful areas on back and neck   Uses THC     Meds. Prescribed:   No orders of the defined types were placed in this encounter.      No follow-ups on file.               Electronically signed by Abhishek Chin DO on11/4/2024 at 10:16 AM

## 2024-11-05 ENCOUNTER — HOSPITAL ENCOUNTER (OUTPATIENT)
Age: 54
Setting detail: OUTPATIENT SURGERY
Discharge: HOME | End: 2024-11-05
Attending: ANESTHESIOLOGY | Admitting: ANESTHESIOLOGY
Payer: MEDICAID

## 2024-11-05 ENCOUNTER — APPOINTMENT (OUTPATIENT)
Dept: GENERAL RADIOLOGY | Age: 54
End: 2024-11-05
Attending: ANESTHESIOLOGY
Payer: MEDICAID

## 2024-11-05 VITALS
DIASTOLIC BLOOD PRESSURE: 75 MMHG | OXYGEN SATURATION: 93 % | HEART RATE: 73 BPM | WEIGHT: 189 LBS | RESPIRATION RATE: 16 BRPM | TEMPERATURE: 97.4 F | HEIGHT: 71 IN | SYSTOLIC BLOOD PRESSURE: 108 MMHG | BODY MASS INDEX: 26.46 KG/M2

## 2024-11-05 PROCEDURE — 99152 MOD SED SAME PHYS/QHP 5/>YRS: CPT | Performed by: ANESTHESIOLOGY

## 2024-11-05 PROCEDURE — 64490 INJ PARAVERT F JNT C/T 1 LEV: CPT | Performed by: ANESTHESIOLOGY

## 2024-11-05 PROCEDURE — 2709999900 HC NON-CHARGEABLE SUPPLY: Performed by: ANESTHESIOLOGY

## 2024-11-05 PROCEDURE — 3600000056 HC PAIN LEVEL 4 BASE: Performed by: ANESTHESIOLOGY

## 2024-11-05 PROCEDURE — 64491 INJ PARAVERT F JNT C/T 2 LEV: CPT | Performed by: ANESTHESIOLOGY

## 2024-11-05 PROCEDURE — 2500000003 HC RX 250 WO HCPCS: Performed by: ANESTHESIOLOGY

## 2024-11-05 PROCEDURE — 7100000010 HC PHASE II RECOVERY - FIRST 15 MIN: Performed by: ANESTHESIOLOGY

## 2024-11-05 PROCEDURE — 6360000002 HC RX W HCPCS: Performed by: ANESTHESIOLOGY

## 2024-11-05 PROCEDURE — 7100000011 HC PHASE II RECOVERY - ADDTL 15 MIN: Performed by: ANESTHESIOLOGY

## 2024-11-05 RX ORDER — MIDAZOLAM HYDROCHLORIDE 1 MG/ML
INJECTION, SOLUTION INTRAMUSCULAR; INTRAVENOUS PRN
Status: DISCONTINUED | OUTPATIENT
Start: 2024-11-05 | End: 2024-11-05 | Stop reason: ALTCHOICE

## 2024-11-05 RX ORDER — FENTANYL CITRATE 50 UG/ML
INJECTION, SOLUTION INTRAMUSCULAR; INTRAVENOUS PRN
Status: DISCONTINUED | OUTPATIENT
Start: 2024-11-05 | End: 2024-11-05 | Stop reason: ALTCHOICE

## 2024-11-05 RX ORDER — LIDOCAINE HYDROCHLORIDE 10 MG/ML
INJECTION, SOLUTION EPIDURAL; INFILTRATION; INTRACAUDAL; PERINEURAL PRN
Status: DISCONTINUED | OUTPATIENT
Start: 2024-11-05 | End: 2024-11-05 | Stop reason: ALTCHOICE

## 2024-11-05 RX ORDER — BUPIVACAINE HYDROCHLORIDE 5 MG/ML
INJECTION, SOLUTION PERINEURAL PRN
Status: DISCONTINUED | OUTPATIENT
Start: 2024-11-05 | End: 2024-11-05 | Stop reason: ALTCHOICE

## 2024-11-05 ASSESSMENT — PAIN - FUNCTIONAL ASSESSMENT
PAIN_FUNCTIONAL_ASSESSMENT: 0-10
PAIN_FUNCTIONAL_ASSESSMENT: 0-10

## 2024-11-05 NOTE — PROGRESS NOTES
Pt. Admitted in stable condition. Consent signed. VS obtained and WNL. INT inserted without complications. Discharge instructions reviewed with the pt. Pt. Denies questions. AVS given to pt. Pt. Denies all other needs. Warm blanket provided. Call light left within reach.

## 2024-11-05 NOTE — PROGRESS NOTES
0914 Patient arrived to phase II via cart.  Spontaneous respiraitons even and unlabored.  Placed on monitor--VSS.  Report received from OR RN    0915 Assessment completed.  Patient is alert and oriented x4.  IV capped off-- no complications.  Patient denies pain--will monitor.  Injection sites clean and dry.      0917 Snack and drink provided. Pt. Denies all other needs at this time. Side rails up X2. Call light handed to pt. Bed locked and in low position.    0932 RN at bedside. Pt states readiness for discharge.    0934 Pt. Standing at bedside. With stand by assist of RN. Pt. Denies weakness or dizziness.    0936 INT removed no Complications noted.    0937 Pt. Getting self dressed. Family notified of discharge    0939 Pt. Ambulated to private vehicle in stable condition with stand by assist of RN.

## 2024-11-05 NOTE — POST SEDATION
[facility]  Sedation/Analgesia Post Sedation Record    Pt Name: Vernon Costello  MRN: [id number]  YOB: 1970  Procedure Performed By: Abhishek Chin DO  Primary Care Physician: Roberta Lawrence APRN - [place]    POST-PROCEDURE    Physicians/Assistants: Abhishek Chin DO  Procedure Performed: See Procedure Note   Sedation/Anesthesia: Versed and Fentanyl (See procedure note for amount and duration)  Estimated Blood Loss:     0  ml  Specimens Removed: None        Complications: None           Abhishek Chin DO  Electronically signed 11/5/2024 at 11:15 AM

## 2024-11-05 NOTE — PRE SEDATION
[facility]  Pre-Sedation/Analgesia History & Physical    Pt Name: Vernon Costello  MRN: [id number]  YOB: 1970  Provider Performing Procedure: Abhishek Chin DO   Primary Care Physician: Roberta Lawrence APRN - NP      MEDICAL HISTORY       has a past medical history of Cerebral artery occlusion with cerebral infarction (HCC), Chronic back pain, DDD (degenerative disc disease), lumbar, Enlarged prostate, Fibromyalgia, Hyperlipidemia, Hypertension, Kidney stones, and Lumbago.  SURGICAL HISTORY   has a past surgical history that includes rhinoplasty (2017); Colonoscopy; Lithotripsy; Pain management procedure (N/A, 1/5/2021); Pain management procedure (N/A, 3/26/2021); Pain management procedure (Bilateral, 5/3/2021); TURP (N/A, 5/26/2021); TURP (N/A, 11/10/2021); Pain management procedure (Bilateral, 4/26/2022); Pain management procedure (Bilateral, 7/5/2022); Pain management procedure (Bilateral, 11/28/2022); and Pain management procedure (Bilateral, 8/19/2024).    ALLERGIES   Allergies as of 10/09/2024    (No Known Allergies)       MEDICATIONS   Prior to Admission medications    Medication Sig Start Date End Date Taking? Authorizing Provider   gabapentin (NEURONTIN) 300 MG capsule Take 1 capsule by mouth 3 times daily for 30 days. 11/6/24 12/6/24 Yes Konstantin Sherwood APRN - CNP   cyclobenzaprine (FLEXERIL) 5 MG tablet TAKE 1 TABLET BY MOUTH THREE TIMES A DAY AS NEEDED FOR MUSCLE SPASM 10/19/24  Yes Konstantin Sherwood APRN - CNP   solifenacin (VESICARE) 10 MG tablet Take 1 tablet by mouth daily 9/5/24 8/31/25 Yes Yamilet Eckert PA-C   lisinopril (PRINIVIL;ZESTRIL) 10 MG tablet Take 1 tablet by mouth daily 8/28/23  Yes Provider, MD Kennedi   tamsulosin (FLOMAX) 0.4 MG capsule Take 1 capsule by mouth daily 9/5/23 11/5/24 Yes Brandon, María L, APRN - CNP   DULoxetine (CYMBALTA) 60 MG extended release capsule TAKE 1 CAPSULE BY MOUTH EVERY DAY 5/2/22  Yes Provider,

## 2024-11-05 NOTE — PROCEDURES
Pre-operative Diagnosis: Cervical spondylosis     Post-operative Diagnosis: Cervical spondylosis     Procedure: Cervical medial branch blocks targeting bilateral C5/C6 and C6/C7     Procedure Description:  After having obtained a signed informed consent, the patient was taken to the fluoroscopy suite and placed in the prone position.  The neck was prepped with chloraprep and draped in a sterile fashion. Then, 0.5 cc of  1 % lidocaine was used to anesthetize the skin and underlying subcutaneous superficial tissues.  Under fluoroscopic guidance, 22G 3.5 inch spinal needles were advanced on to the mid facet pilar of C5, C6, and C7 on the RIGHT.  There were no paresthesias, heme, or CSF aspiration.  Needle placement was confirmed using the AP and lateral views. Then, 0.5 cc 0.5% bupivacaine was injected. The needles were removed without any complication. The procedure was repeated on the contralateral side.  The patient tolerated the procedure well and was transported to the recovery room where he was observed for 15 minutes to then be discharged in ambulatory fashion.      Procedural Complications: None  Estimated Blood Loss: 0 mL      IV sedation was used during the procedure:  - Moderate intravenous conscious sedation was supervised by Dr. Chin  - The patient was independently monitored by a Registered Nurse assigned to the procedure room  - Monitoring included automated blood pressure, continuous EKG, and continuous pulse oximetry  - The detailed conscious record is permanently stored in the Hospital Information System  - The following is the conscious sedation record:  Start Time: 09:07  End Time : 09:22  Duration: 15 minutes   Medications Administered: 2 mg Versed, 100 mcg Fentanyl      Abhishek Chin DO  Interventional Pain Management/PM&R   [facility] [place] [facility]

## 2024-11-11 RX ORDER — MIRABEGRON 50 MG/1
50 TABLET, FILM COATED, EXTENDED RELEASE ORAL DAILY
Qty: 90 TABLET | Refills: 4 | OUTPATIENT
Start: 2024-11-11

## 2024-11-19 ENCOUNTER — OFFICE VISIT (OUTPATIENT)
Dept: PHYSICAL MEDICINE AND REHAB | Age: 54
End: 2024-11-19
Payer: MEDICAID

## 2024-11-19 VITALS
HEIGHT: 71 IN | WEIGHT: 188.93 LBS | DIASTOLIC BLOOD PRESSURE: 64 MMHG | BODY MASS INDEX: 26.45 KG/M2 | SYSTOLIC BLOOD PRESSURE: 110 MMHG

## 2024-11-19 DIAGNOSIS — M54.41 CHRONIC MIDLINE LOW BACK PAIN WITH BILATERAL SCIATICA: ICD-10-CM

## 2024-11-19 DIAGNOSIS — M47.816 LUMBAR SPONDYLOSIS: ICD-10-CM

## 2024-11-19 DIAGNOSIS — M54.17 LUMBOSACRAL RADICULITIS: ICD-10-CM

## 2024-11-19 DIAGNOSIS — M51.369 ANNULAR TEAR OF LUMBAR DISC: ICD-10-CM

## 2024-11-19 DIAGNOSIS — M48.061 LUMBAR FORAMINAL STENOSIS: ICD-10-CM

## 2024-11-19 DIAGNOSIS — M54.42 CHRONIC MIDLINE LOW BACK PAIN WITH BILATERAL SCIATICA: ICD-10-CM

## 2024-11-19 DIAGNOSIS — M54.2 NECK PAIN: ICD-10-CM

## 2024-11-19 DIAGNOSIS — M47.812 CERVICAL SPONDYLOSIS: Primary | ICD-10-CM

## 2024-11-19 DIAGNOSIS — G89.29 CHRONIC MIDLINE LOW BACK PAIN WITH BILATERAL SCIATICA: ICD-10-CM

## 2024-11-19 DIAGNOSIS — G89.4 CHRONIC PAIN SYNDROME: ICD-10-CM

## 2024-11-19 PROCEDURE — G8427 DOCREV CUR MEDS BY ELIG CLIN: HCPCS | Performed by: NURSE PRACTITIONER

## 2024-11-19 PROCEDURE — 99214 OFFICE O/P EST MOD 30 MIN: CPT | Performed by: NURSE PRACTITIONER

## 2024-11-19 PROCEDURE — G8419 CALC BMI OUT NRM PARAM NOF/U: HCPCS | Performed by: NURSE PRACTITIONER

## 2024-11-19 PROCEDURE — 3017F COLORECTAL CA SCREEN DOC REV: CPT | Performed by: NURSE PRACTITIONER

## 2024-11-19 PROCEDURE — G8484 FLU IMMUNIZE NO ADMIN: HCPCS | Performed by: NURSE PRACTITIONER

## 2024-11-19 PROCEDURE — 1036F TOBACCO NON-USER: CPT | Performed by: NURSE PRACTITIONER

## 2024-11-19 ASSESSMENT — ENCOUNTER SYMPTOMS
SHORTNESS OF BREATH: 0
RESPIRATORY NEGATIVE: 1
COUGH: 0
BACK PAIN: 1
WHEEZING: 0
GASTROINTESTINAL NEGATIVE: 1

## 2024-11-19 NOTE — PROGRESS NOTES
[facility]  [address] [place] [address] [address]  [place] [id number]  Dept: [phone]  Dept Fax: [phone]  Loc: [phone]    Visit Date: 11/19/2024    Functionality Assessment/Goals Worksheet     On a scale of 0 (Does not Interfere) to 10 (Completely Interferes)     1.  Which number describes how during the past week pain has interfered with       the following:  A.  General Activity:  5  B.  Mood: 5  C.  Walking Ability:  5  D.  Normal Work (Includes both work outside the home and housework):  7  E.  Relations with Other People:   4  F.  Sleep:   4  G.  Enjoyment of Life:   5    2.  Patient Prefers to Take their Pain Medications:     []  On a regular basis   [x]  Only when necessary    []  Does not take pain medications    3.  What are the Patient's Goals/Expectations for Visiting Pain Management?     [x]  Learn about my pain    [x]  Receive Medication   [x]  Physical Therapy     []  Treat Depression   [x]  Receive Injections    []  Treat Sleep   []  Deal with Anxiety and Stress   []  Treat Opoid Dependence/Addiction   []  Other:      HPI:   Vernon Costello is a 54 y.o. male is here today for    Chief Complaint: Neck pain, low back pain     HPI   Follow up from bilateral C-facet MBB # 1 @ C5-6 and C6-7 completed by Dr. Subramanian on 11/5/2024. Vernon reports that he received 80% relief of neck pain of neck pain for about 1 week and then 60% relief relief for another week and then neck pain started to increase. Is  not yet at the level it was prior but is slowly increasing. States he had better ROM and could tolerate more activity with less pain. States this helped headaches as well.   Pain is in posterior neck at base aching pain radiating across shoulder blades.    Continues to have pain in low back tightness and aching- has ups and downs but states he continues to receive good relief from bilateral L-facet RFA      Continues to have

## 2024-11-21 RX ORDER — CYCLOBENZAPRINE HCL 5 MG
TABLET ORAL
Qty: 90 TABLET | Refills: 0 | Status: SHIPPED | OUTPATIENT
Start: 2024-11-21 | End: 2024-12-21

## 2024-11-25 RX ORDER — CYCLOBENZAPRINE HCL 5 MG
TABLET ORAL
Qty: 90 TABLET | Refills: 0 | OUTPATIENT
Start: 2024-11-25

## 2024-12-10 RX ORDER — GABAPENTIN 300 MG/1
300 CAPSULE ORAL 3 TIMES DAILY
Qty: 90 CAPSULE | Refills: 0 | Status: SHIPPED | OUTPATIENT
Start: 2024-12-10 | End: 2025-01-09

## 2024-12-19 ENCOUNTER — HOSPITAL ENCOUNTER (OUTPATIENT)
Age: 54
Discharge: HOME | End: 2024-12-19
Payer: MEDICAID

## 2024-12-19 ENCOUNTER — OFFICE VISIT (OUTPATIENT)
Dept: NEUROLOGY | Age: 54
End: 2024-12-19
Payer: MEDICAID

## 2024-12-19 ENCOUNTER — HOSPITAL ENCOUNTER (OUTPATIENT)
Dept: GENERAL RADIOLOGY | Age: 54
Discharge: HOME | End: 2024-12-21
Payer: MEDICAID

## 2024-12-19 VITALS
BODY MASS INDEX: 26.8 KG/M2 | WEIGHT: 191.4 LBS | SYSTOLIC BLOOD PRESSURE: 136 MMHG | RESPIRATION RATE: 16 BRPM | HEART RATE: 82 BPM | OXYGEN SATURATION: 96 % | DIASTOLIC BLOOD PRESSURE: 78 MMHG | HEIGHT: 71 IN

## 2024-12-19 DIAGNOSIS — R41.3 MEMORY LOSS: Primary | ICD-10-CM

## 2024-12-19 DIAGNOSIS — M54.16 CHRONIC LUMBAR RADICULOPATHY: ICD-10-CM

## 2024-12-19 DIAGNOSIS — I10 PRIMARY HYPERTENSION: ICD-10-CM

## 2024-12-19 DIAGNOSIS — R26.9 GAIT DIFFICULTY: ICD-10-CM

## 2024-12-19 DIAGNOSIS — G47.9 SLEEP DIFFICULTIES: ICD-10-CM

## 2024-12-19 DIAGNOSIS — M48.062 SPINAL STENOSIS OF LUMBAR REGION WITH NEUROGENIC CLAUDICATION: ICD-10-CM

## 2024-12-19 DIAGNOSIS — F41.9 ANXIETY AND DEPRESSION: ICD-10-CM

## 2024-12-19 DIAGNOSIS — R26.89 BALANCE PROBLEM: ICD-10-CM

## 2024-12-19 DIAGNOSIS — F01.50 VASCULAR DEMENTIA, UNSPECIFIED DEMENTIA SEVERITY, UNSPECIFIED WHETHER BEHAVIORAL, PSYCHOTIC, OR MOOD DISTURBANCE OR ANXIETY (HCC): ICD-10-CM

## 2024-12-19 DIAGNOSIS — R41.3 MEMORY LOSS: ICD-10-CM

## 2024-12-19 DIAGNOSIS — M47.816 LUMBAR SPONDYLOSIS: ICD-10-CM

## 2024-12-19 DIAGNOSIS — G31.9 CEREBRAL ATROPHY, MILD (HCC): ICD-10-CM

## 2024-12-19 DIAGNOSIS — Z87.891 EX-SMOKER: ICD-10-CM

## 2024-12-19 DIAGNOSIS — G60.8 POLYNEUROPATHY, PERIPHERAL SENSORIMOTOR AXONAL: ICD-10-CM

## 2024-12-19 DIAGNOSIS — I67.82 CHRONIC CEREBRAL ISCHEMIA: ICD-10-CM

## 2024-12-19 DIAGNOSIS — Z91.81 H/O FALLING: ICD-10-CM

## 2024-12-19 DIAGNOSIS — H46.9 OPTIC NEURITIS, RIGHT: ICD-10-CM

## 2024-12-19 DIAGNOSIS — Z86.73 H/O: STROKE: ICD-10-CM

## 2024-12-19 DIAGNOSIS — E78.2 MIXED HYPERLIPIDEMIA: ICD-10-CM

## 2024-12-19 DIAGNOSIS — F32.A ANXIETY AND DEPRESSION: ICD-10-CM

## 2024-12-19 LAB
AMPHET UR QL SCN: NEGATIVE
BARBITURATES UR QL SCN: NEGATIVE
BENZODIAZ UR QL: NEGATIVE
CANNABINOIDS UR QL SCN: POSITIVE
COCAINE UR QL SCN: NEGATIVE
EST. AVERAGE GLUCOSE BLD GHB EST-MCNC: 123 MG/DL
FENTANYL UR QL: NEGATIVE
HAV IGM SERPL QL IA: NONREACTIVE
HBA1C MFR BLD: 5.9 % (ref 4–6)
HBV CORE IGM SERPL QL IA: NONREACTIVE
HBV SURFACE AG SERPL QL IA: NONREACTIVE
HCV AB SERPL QL IA: NONREACTIVE
HIV 1+2 AB+HIV1 P24 AG SERPL QL IA: NONREACTIVE
METHADONE UR QL: NEGATIVE
OPIATES UR QL SCN: NEGATIVE
OXYCODONE UR QL SCN: NEGATIVE
PCP UR QL SCN: NEGATIVE
TEST INFORMATION: ABNORMAL

## 2024-12-19 PROCEDURE — 99204 OFFICE O/P NEW MOD 45 MIN: CPT | Performed by: PSYCHIATRY & NEUROLOGY

## 2024-12-19 PROCEDURE — 80074 ACUTE HEPATITIS PANEL: CPT

## 2024-12-19 PROCEDURE — 36415 COLL VENOUS BLD VENIPUNCTURE: CPT

## 2024-12-19 PROCEDURE — 83036 HEMOGLOBIN GLYCOSYLATED A1C: CPT

## 2024-12-19 PROCEDURE — 71046 X-RAY EXAM CHEST 2 VIEWS: CPT

## 2024-12-19 PROCEDURE — 87389 HIV-1 AG W/HIV-1&-2 AB AG IA: CPT

## 2024-12-19 PROCEDURE — 72100 X-RAY EXAM L-S SPINE 2/3 VWS: CPT

## 2024-12-19 PROCEDURE — 80307 DRUG TEST PRSMV CHEM ANLYZR: CPT

## 2024-12-19 PROCEDURE — 72040 X-RAY EXAM NECK SPINE 2-3 VW: CPT

## 2024-12-19 PROCEDURE — 84155 ASSAY OF PROTEIN SERUM: CPT

## 2024-12-19 PROCEDURE — 84165 PROTEIN E-PHORESIS SERUM: CPT

## 2024-12-19 RX ORDER — FENOFIBRATE 145 MG/1
145 TABLET, COATED ORAL DAILY
COMMUNITY
Start: 2024-08-18

## 2024-12-19 RX ORDER — ERGOCALCIFEROL 1.25 MG/1
CAPSULE, LIQUID FILLED ORAL
COMMUNITY
Start: 2024-07-15 | End: 2024-12-19

## 2024-12-19 RX ORDER — ERGOCALCIFEROL 1.25 MG/1
50000 CAPSULE, LIQUID FILLED ORAL WEEKLY
COMMUNITY

## 2024-12-19 RX ORDER — ROSUVASTATIN CALCIUM 20 MG/1
20 TABLET, COATED ORAL
COMMUNITY

## 2024-12-19 RX ORDER — PYRANTEL PAMOATE 50 MG/ML
1 SUSPENSION, ORAL (FINAL DOSE FORM) ORAL DAILY
COMMUNITY

## 2024-12-19 RX ORDER — ACETAMINOPHEN 500 MG
TABLET ORAL
COMMUNITY
Start: 2024-02-19

## 2024-12-19 ASSESSMENT — ENCOUNTER SYMPTOMS
WHEEZING: 0
EYE REDNESS: 0
VISUAL CHANGE: 1
PHOTOPHOBIA: 0
FACIAL SWELLING: 0
APNEA: 0
CHEST TIGHTNESS: 0
ABDOMINAL DISTENTION: 0
EYE PAIN: 0
VOICE CHANGE: 0
BLOOD IN STOOL: 0
EYE ITCHING: 0
DIARRHEA: 0
SORE THROAT: 0
COLOR CHANGE: 0
EYE DISCHARGE: 0
SHORTNESS OF BREATH: 0
BACK PAIN: 1
COUGH: 0
TROUBLE SWALLOWING: 0
NAUSEA: 0
VOMITING: 0
ABDOMINAL PAIN: 0
CHOKING: 0
SINUS PRESSURE: 0
CONSTIPATION: 0

## 2024-12-19 ASSESSMENT — PATIENT HEALTH QUESTIONNAIRE - PHQ9
SUM OF ALL RESPONSES TO PHQ9 QUESTIONS 1 & 2: 0
1. LITTLE INTEREST OR PLEASURE IN DOING THINGS: NOT AT ALL
2. FEELING DOWN, DEPRESSED OR HOPELESS: NOT AT ALL
SUM OF ALL RESPONSES TO PHQ QUESTIONS 1-9: 0

## 2024-12-19 NOTE — PATIENT INSTRUCTIONS
* FALL   PRECAUTIONS.            *  USE   WALKING  ASSISTANCE  DEVICES     QUAD  CANE          *   AVOID   DRIVING           *   ADEQUATE   FLUID  INTAKE   AND  ELECTROLYTE  BALANCE             * KEEP  DAIRY  OF   THE  NEUROLOGICAL  SYMPTOMS          *  TO  MAINTAIN  REGULAR  SLEEP  WAKE  CYCLES.     *   TO  HAVE  ADEQUATE  REST  AND   SLEEP    HOURS.          *    AVOID  USAGE OF   TOBACCO,  EXCESSIVE  ALCOHOL                AND   ILLEGAL   SUBSTANCES,  IF  ANY          *  Maintain   Healthy  Life Style    With   Periodic  Monitoring  Of         Any  Medical  Conditions  Including   Elevated  Blood  Pressure,  Lipid  Profile,       Blood  Sugar levels  And   Heart  Disease.                *   Period   Screening  For  Cancers  Involving  Breast,  Colon,         Lungs  And  Other  Organs  As  Applicable,           In consultation   With  Your  Primary Care Providers.                *  If   There is  Any  Significant  Worsening   Of  Current  Symptoms  And             Or  If    Any additional  New  Neurological  Symptoms  and          Significant  Concerns   Should  Call  911 or  Go  To  Emergency  Department            For  Further  Immediate  Evaluation.

## 2024-12-19 NOTE — PROGRESS NOTES
family.  Follow-up careis a key part of your treatment and safety. Be sure to make and go to all appointments, and call your doctor if you are having problems. It’s also a good idea to know your test results and keep a list of the medicines you take.  How can you care for yourself at home?  Do not eat too much sugar, fat, or fast foods. You can still have dessert and treats nowand then. The goal is moderation.  Start small to improve your eating habits. Pay attention to portion sizes, drink less juice and soda pop, and eat more fruits and vegetables.  Eat a healthy amount of food. A 3-ounce serving of meat, for example, is about the size of a deck of cards. Fill the rest of your plate with vegetables and whole grains.  Limit theamount of soda and sports drinks you have every day. Drink more water when you are thirsty.  Eat at least 5 servings of fruits and vegetables every day. It may seem like a lot, but it is not hard to reach this goal. Aserving or helping is 1 piece of fruit, 1 cup of vegetables, or 2 cups of leafy, raw vegetables. Have an apple or some carrot sticks as an afternoon snack instead of a candy bar. Try to have fruits and/or vegetables at everymeal.  Make exercise part of your daily routine. You may want to start with simple activities, such as walking, bicycling, or slow swimming. Try alfreda active 30 to 60 minutes every day. You do not need to do all 30 to 60 minutes all at once. For example, you can exercise 3 times a day for 10 or 20 minutes. Moderate exercise is safe for most people, but it is always agood idea to talk to your doctor before starting an exercise program.  Keep moving. Mow the lawn, work in the garden, or clean your house. Take the stairs instead of the elevator at work.  If you smoke, quit. Peoplewho smoke have an increased risk for heart attack, stroke, cancer, and other lung illnesses. Quitting is hard, but there are ways to boost your chance of quitting tobacco for good.  Use

## 2024-12-20 LAB
ALBUMIN PERCENT: 60 % (ref 45–65)
ALBUMIN SERPL-MCNC: 4.2 G/DL (ref 3.2–5.2)
ALPHA 2 PERCENT: 9 % (ref 6–13)
ALPHA1 GLOB SERPL ELPH-MCNC: 0.3 G/DL (ref 0.1–0.4)
ALPHA1 GLOB SERPL ELPH-MCNC: 4 % (ref 3–6)
ALPHA2 GLOB SERPL ELPH-MCNC: 0.7 G/DL (ref 0.5–0.9)
B-GLOBULIN SERPL ELPH-MCNC: 1 G/DL (ref 0.5–1.1)
B-GLOBULIN SERPL ELPH-MCNC: 14 % (ref 11–19)
GAMMA GLOB SERPL ELPH-MCNC: 0.9 G/DL (ref 0.5–1.5)
GAMMA GLOBULIN %: 13 % (ref 9–20)
PATHOLOGIST: NORMAL
PROT PATTERN SERPL ELPH-IMP: NORMAL
PROT SERPL-MCNC: 7.1 G/DL (ref 6.6–8.7)
TOTAL PROT. SUM,%: 100 % (ref 98–102)
TOTAL PROT. SUM: 7.1 G/DL (ref 6.3–8.2)

## 2025-01-07 ENCOUNTER — HOSPITAL ENCOUNTER (OUTPATIENT)
Dept: INTERVENTIONAL RADIOLOGY/VASCULAR | Age: 55
Discharge: HOME | End: 2025-01-09
Payer: MEDICAID

## 2025-01-07 ENCOUNTER — HOSPITAL ENCOUNTER (OUTPATIENT)
Dept: MRI IMAGING | Age: 55
Discharge: HOME | End: 2025-01-09
Payer: MEDICAID

## 2025-01-07 DIAGNOSIS — N13.8 HYPERTROPHY OF PROSTATE WITH URINARY OBSTRUCTION: ICD-10-CM

## 2025-01-07 DIAGNOSIS — N40.1 HYPERTROPHY OF PROSTATE WITH URINARY OBSTRUCTION: ICD-10-CM

## 2025-01-07 DIAGNOSIS — R41.3 MEMORY LOSS: ICD-10-CM

## 2025-01-07 LAB
VAS LEFT CCA DIST EDV: 19.7 CM/S
VAS LEFT CCA DIST PSV: 71.3 CM/S
VAS LEFT CCA MID EDV: 19.67 CM/S
VAS LEFT CCA MID PSV: 75.73 CM/S
VAS LEFT CCA PROX EDV: 19.3 CM/S
VAS LEFT CCA PROX PSV: 85.3 CM/S
VAS LEFT ECA EDV: 12.75 CM/S
VAS LEFT ECA PSV: 86.4 CM/S
VAS LEFT ICA DIST EDV: 24.1 CM/S
VAS LEFT ICA DIST PSV: 73.5 CM/S
VAS LEFT ICA MID EDV: 17.8 CM/S
VAS LEFT ICA MID PSV: 51.9 CM/S
VAS LEFT ICA PROX EDV: 12.3 CM/S
VAS LEFT ICA PROX PSV: 44.2 CM/S
VAS LEFT ICA/CCA PSV: 0.97 NO UNITS
VAS LEFT VERTEBRAL EDV: 9.89 CM/S
VAS LEFT VERTEBRAL PSV: 33.4 CM/S
VAS RIGHT CCA DIST EDV: 18 CM/S
VAS RIGHT CCA DIST PSV: 64.6 CM/S
VAS RIGHT CCA MID EDV: 16.69 CM/S
VAS RIGHT CCA MID PSV: 76.23 CM/S
VAS RIGHT CCA PROX EDV: 18 CM/S
VAS RIGHT CCA PROX PSV: 78.8 CM/S
VAS RIGHT ECA EDV: 8.93 CM/S
VAS RIGHT ECA PSV: 67.2 CM/S
VAS RIGHT ICA DIST EDV: 21.9 CM/S
VAS RIGHT ICA DIST PSV: 59.4 CM/S
VAS RIGHT ICA MID EDV: 25.8 CM/S
VAS RIGHT ICA MID PSV: 77.5 CM/S
VAS RIGHT ICA PROX EDV: 12.5 CM/S
VAS RIGHT ICA PROX PSV: 41.7 CM/S
VAS RIGHT ICA/CCA PSV: 1 NO UNITS
VAS RIGHT VERTEBRAL EDV: 8.9 CM/S
VAS RIGHT VERTEBRAL PSV: 29 CM/S

## 2025-01-07 PROCEDURE — 93880 EXTRACRANIAL BILAT STUDY: CPT

## 2025-01-07 PROCEDURE — 93880 EXTRACRANIAL BILAT STUDY: CPT | Performed by: PSYCHIATRY & NEUROLOGY

## 2025-01-07 PROCEDURE — 70551 MRI BRAIN STEM W/O DYE: CPT

## 2025-01-07 RX ORDER — CYCLOBENZAPRINE HCL 5 MG
TABLET ORAL
Qty: 90 TABLET | Refills: 0 | Status: SHIPPED | OUTPATIENT
Start: 2025-01-07 | End: 2025-02-06

## 2025-01-07 RX ORDER — TAMSULOSIN HYDROCHLORIDE 0.4 MG/1
0.4 CAPSULE ORAL DAILY
Qty: 90 CAPSULE | Refills: 2 | Status: SHIPPED | OUTPATIENT
Start: 2025-01-07 | End: 2025-10-04

## 2025-01-07 RX ORDER — GABAPENTIN 300 MG/1
300 CAPSULE ORAL 3 TIMES DAILY
Qty: 90 CAPSULE | Refills: 0 | Status: SHIPPED | OUTPATIENT
Start: 2025-01-09 | End: 2025-02-08

## 2025-01-07 NOTE — TELEPHONE ENCOUNTER
Vernon Costello called requesting a refill on the following medications:  Requested Prescriptions     Pending Prescriptions Disp Refills    tamsulosin (FLOMAX) 0.4 MG capsule 90 capsule 2     Sig: Take 1 capsule by mouth daily     Pharmacy verified:  [email or web address]      Date of last visit: 09/05/2024  Date of next visit (if applicable): 03/06/2025

## 2025-01-07 NOTE — TELEPHONE ENCOUNTER
Refilled.     Please see if patient would like to move appt to 3/13/2024 in [place]    The patient should go to the ED should they develop fever, chills, nausea, vomiting, chest pain, SOB, calf pain, feelings of incomplete emptying, or should they otherwise feel they need evaluated

## 2025-01-07 NOTE — TELEPHONE ENCOUNTER
Vernon Costello called requesting a refill on the following medications:  Requested Prescriptions     Pending Prescriptions Disp Refills    cyclobenzaprine (FLEXERIL) 5 MG tablet 90 tablet 0     Sig: TAKE 1 TABLET BY MOUTH THREE TIMES A DAY AS NEEDED FOR MUSCLE SPASM     Pharmacy verified: [facility]  .[email or web address]      Date of last visit: 11/19/2024  Date of next visit (if applicable): 3/4/2025

## 2025-01-30 ENCOUNTER — HOSPITAL ENCOUNTER (OUTPATIENT)
Dept: NEUROLOGY | Age: 55
Discharge: HOME | End: 2025-01-30
Payer: MEDICAID

## 2025-01-30 DIAGNOSIS — R41.3 MEMORY LOSS: ICD-10-CM

## 2025-01-30 PROCEDURE — 93892 TCD EMBOLI DETECT W/O INJ: CPT

## 2025-01-30 PROCEDURE — 95813 EEG EXTND MNTR 61-119 MIN: CPT

## 2025-01-30 PROCEDURE — 93886 INTRACRANIAL COMPLETE STUDY: CPT

## 2025-01-30 NOTE — PROGRESS NOTES
TCD Completed with Emboli Detection.    PCP: Roberta Lawrence, ANGELINA - NP    Ordering: Alejandro Lopez Neurologist    Interpreting Physician: Alejandro Lopez Neurologgalo    Electronically signed by Nelly Bazzi RN on 1/30/2025 at 8:51 AM

## 2025-01-30 NOTE — PROGRESS NOTES
EXTENDED EEG Completed with Prakash Analysis.    PCP: Roberta Lawrence, ANGELINA - NP    Ordering: Alejandro Lopez Neurologist    Interpreting Physician: Jessica Marie Neurologist    Technician: Nelly Bazzi RN

## 2025-02-10 RX ORDER — GABAPENTIN 300 MG/1
300 CAPSULE ORAL 3 TIMES DAILY
Qty: 90 CAPSULE | Refills: 0 | Status: SHIPPED | OUTPATIENT
Start: 2025-02-10 | End: 2025-03-12

## 2025-02-10 NOTE — DISCHARGE INSTRUCTIONS
Post procedure Instructions:    No driving or making significant decisions for the remainder of the day.   Be cautions with walking and activity for 24 hours, do not over exert yourself.  Ok to resume pre-procedure activity level today.  Apply ice to site of injection site if you have pain or discomfort.  Do not submerge sit of injection during bath or pool activities for 48 hours post-procedure.  Resume blood thinning medications in 24 hours.     Call office [phone] if you have:  Temperature greater than 100.4  Persistent nausea and vomiting  Severe uncontrolled pain  Redness, tenderness, or signs of infection (pain, swelling, redness, odor or green/yellow discharge around the site)  Difficulty breathing, headache or visual disturbances  Hives  Persistent dizziness or light-headedness  Extreme fatigue  Any other questions or concerns you may have after discharge    In an emergency, call 911 or go to an Emergency Department at a nearby hospital    “Surgical Site Infections”      How can we work together to prevent Surgical Site Infections?   We would like to thank you for choosing [facility] for your Surgical Care.  Below you will find helpful information on how we can work together to prevent Surgical Site Infections.    What is a Surgical Site Infection (SSI)?  A surgical site infection is an infection that occurs after surgery in the part of the body where the surgery took place. Most patients who have surgery do not develop an infection. However, infections develop in about 1 to 3 out of every 100 patients who have surgery.  Some of the common symptoms of a surgical site infection are:  Redness and pain around the area where you had surgery  Drainage of cloudy fluid from your surgical wound  Fever    Can SSIs be treated?  Yes. Most surgical site infections can be treated with antibiotics. The antibiotic given to you depends on the bacteria (germs) causing the infection. Sometimes patients with

## 2025-02-11 RX ORDER — CYCLOBENZAPRINE HCL 5 MG
TABLET ORAL
Qty: 90 TABLET | Refills: 0 | Status: SHIPPED | OUTPATIENT
Start: 2025-02-11

## 2025-02-17 ASSESSMENT — ENCOUNTER SYMPTOMS
GASTROINTESTINAL NEGATIVE: 1
RESPIRATORY NEGATIVE: 1
BACK PAIN: 1
SHORTNESS OF BREATH: 0
COUGH: 0
WHEEZING: 0

## 2025-02-17 NOTE — H&P
Today, patient presents for planned bilateral Cervical facet medial branch block at C5-6 and C6-7    This note is reflective of the patient's previous visit for evaluation. We will proceed with today's planned procedure. Since patient's last visit for evaluation, there have been no interval changes in medical history. Patient has no new numbness, weakness, or focal neurological deficit since evaluation. Patient has no contraindications to injection (no anticoagulation or recent antibiotic intake for active infections), and has a  present or is able to drive themselves (as discussed and cleared by physician).  Allergies to latex, contrast dye, and steroid medications have been confirmed with the patient prior to the procedure.  NPO necessity has been assessed and accepted based on procedure complexity. The risks and benefits of the procedure have been explained including but are not limited to infection, bleeding, paralysis, immediate post procedure weakness, and dizziness; the patient acknowledges understanding and desires to proceed with the procedure. Patient has signed consent for same procedure as discussed in previous clinic encounter. All other questions and concerns were addressed at bedside. See procedure note for full details.       Post procedure Instructions: The patient was advised not to drive during the day of the procedure and not to engage in any significant decision making (unless otherwise states by physician). The patient was also advised to be cautious with walking/activity for 24 hours following today's visit and asked not to engage in over-exertion (unless otherwise states by physician). After this time, it is ok to resume pre-procedure level of activity. Patient advised to apply ice to site of injection in situations of pain and discomfort. Patient advised to not submerge site of injection during bath or pool activities for approximately 24 hours post-procedure. Patient attested to

## 2025-02-18 ENCOUNTER — HOSPITAL ENCOUNTER (OUTPATIENT)
Age: 55
Setting detail: OUTPATIENT SURGERY
Discharge: HOME | End: 2025-02-18
Attending: ANESTHESIOLOGY | Admitting: ANESTHESIOLOGY
Payer: MEDICAID

## 2025-02-18 ENCOUNTER — APPOINTMENT (OUTPATIENT)
Dept: GENERAL RADIOLOGY | Age: 55
End: 2025-02-18
Attending: ANESTHESIOLOGY
Payer: MEDICAID

## 2025-02-18 VITALS
DIASTOLIC BLOOD PRESSURE: 74 MMHG | OXYGEN SATURATION: 95 % | TEMPERATURE: 97 F | BODY MASS INDEX: 27.19 KG/M2 | WEIGHT: 194.2 LBS | HEART RATE: 72 BPM | RESPIRATION RATE: 12 BRPM | HEIGHT: 71 IN | SYSTOLIC BLOOD PRESSURE: 114 MMHG

## 2025-02-18 PROCEDURE — 3600000056 HC PAIN LEVEL 4 BASE: Performed by: ANESTHESIOLOGY

## 2025-02-18 PROCEDURE — 6360000002 HC RX W HCPCS: Performed by: ANESTHESIOLOGY

## 2025-02-18 PROCEDURE — 64490 INJ PARAVERT F JNT C/T 1 LEV: CPT | Performed by: ANESTHESIOLOGY

## 2025-02-18 PROCEDURE — 99152 MOD SED SAME PHYS/QHP 5/>YRS: CPT | Performed by: ANESTHESIOLOGY

## 2025-02-18 PROCEDURE — 64491 INJ PARAVERT F JNT C/T 2 LEV: CPT | Performed by: ANESTHESIOLOGY

## 2025-02-18 PROCEDURE — 7100000010 HC PHASE II RECOVERY - FIRST 15 MIN: Performed by: ANESTHESIOLOGY

## 2025-02-18 PROCEDURE — 7100000011 HC PHASE II RECOVERY - ADDTL 15 MIN: Performed by: ANESTHESIOLOGY

## 2025-02-18 PROCEDURE — 2709999900 HC NON-CHARGEABLE SUPPLY: Performed by: ANESTHESIOLOGY

## 2025-02-18 RX ORDER — FENTANYL CITRATE 50 UG/ML
INJECTION, SOLUTION INTRAMUSCULAR; INTRAVENOUS PRN
Status: DISCONTINUED | OUTPATIENT
Start: 2025-02-18 | End: 2025-02-18 | Stop reason: ALTCHOICE

## 2025-02-18 RX ORDER — MIDAZOLAM HYDROCHLORIDE 1 MG/ML
INJECTION, SOLUTION INTRAMUSCULAR; INTRAVENOUS PRN
Status: DISCONTINUED | OUTPATIENT
Start: 2025-02-18 | End: 2025-02-18 | Stop reason: ALTCHOICE

## 2025-02-18 RX ORDER — LIDOCAINE HYDROCHLORIDE 10 MG/ML
INJECTION, SOLUTION EPIDURAL; INFILTRATION; INTRACAUDAL; PERINEURAL PRN
Status: DISCONTINUED | OUTPATIENT
Start: 2025-02-18 | End: 2025-02-18 | Stop reason: ALTCHOICE

## 2025-02-18 RX ORDER — BUPIVACAINE HYDROCHLORIDE 5 MG/ML
INJECTION, SOLUTION PERINEURAL PRN
Status: DISCONTINUED | OUTPATIENT
Start: 2025-02-18 | End: 2025-02-18 | Stop reason: ALTCHOICE

## 2025-02-18 ASSESSMENT — PAIN - FUNCTIONAL ASSESSMENT: PAIN_FUNCTIONAL_ASSESSMENT: NONE - DENIES PAIN

## 2025-02-18 NOTE — PROGRESS NOTES
1413: Pt arrived to Phase II recovery via cart. Eyes closed but awakens to voice. Report received from JUDITH Daniel.  1415: VSS. Patient denies pain. HOB elevated. Snack and drink provided. Call light placed in reach. Bed locked and in lowest position.  1435: IV removed. Patient sat edge of bed and dressed independently.  1440: Patient escorted to vehicle via standby assist. Discharged home in stable condition with ex-wife.

## 2025-02-19 NOTE — POST SEDATION
[facility]  Sedation/Analgesia Post Sedation Record    Pt Name: Vernon Costello  MRN: [id number]  YOB: 1970  Procedure Performed By: Abhishek Chin DO  Primary Care Physician: Roberta Lawrence APRN - [place]    POST-PROCEDURE    Physicians/Assistants: Abhishek Chin DO  Procedure Performed: See Procedure Note   Sedation/Anesthesia: Versed and Fentanyl (See procedure note for amount and duration)  Estimated Blood Loss:     0  ml  Specimens Removed: None        Complications: None           Abhishek Chin DO  Electronically signed 2/18/2025 at 7:17 PM

## 2025-02-19 NOTE — PROCEDURES
Pre-operative Diagnosis: Cervical spondylosis     Post-operative Diagnosis: Cervical spondylosis     Procedure: Cervical medial branch blocks targeting bilateral C5/C6 and C6/C7     Procedure Description:  After having obtained a signed informed consent, the patient was taken to the fluoroscopy suite and placed in the prone position.  The neck was prepped with chloraprep and draped in a sterile fashion. Then, 0.5 cc of  1 % lidocaine was used to anesthetize the skin and underlying subcutaneous superficial tissues.  Under fluoroscopic guidance, 22G 3.5 inch spinal needles were advanced on to the mid facet pilar of C5, C6, and C7 on the RIGHT.  There were no paresthesias, heme, or CSF aspiration.  Needle placement was confirmed using the AP and lateral views. Then, 0.5 cc 0.5% bupivacaine was injected. The needles were removed without any complication. The procedure was repeated on the contralateral side.  The patient tolerated the procedure well and was transported to the recovery room where he was observed for 15 minutes to then be discharged in ambulatory fashion.      Procedural Complications: None  Estimated Blood Loss: 0 mL        IV sedation was used during the procedure:  - Moderate intravenous conscious sedation was supervised by Dr. Chin  - The patient was independently monitored by a Registered Nurse assigned to the procedure room  - Monitoring included automated blood pressure, continuous EKG, and continuous pulse oximetry  - The detailed conscious record is permanently stored in the Hospital Information System  - The following is the conscious sedation record:  Start Time: 14:06  End Time : 14:21  Duration: 15 minutes   Medications Administered: 2 mg Versed, 100 mcg Fentanyl        Abhishek Chin DO  Interventional Pain Management/PM&R   [facility]

## 2025-02-19 NOTE — PRE SEDATION
[facility]  Pre-Sedation/Analgesia History & Physical    Pt Name: Vernon Costello  MRN: [id number]  YOB: 1970  Provider Performing Procedure: Abhishek Chin DO   Primary Care Physician: Roberta Lawrence APRN - NP      MEDICAL HISTORY       has a past medical history of Cerebral artery occlusion with cerebral infarction (HCC), Chronic back pain, DDD (degenerative disc disease), lumbar, Enlarged prostate, Fibromyalgia, Hyperlipidemia, Hypertension, Kidney stones, and Lumbago.  SURGICAL HISTORY   has a past surgical history that includes rhinoplasty (2017); Colonoscopy; Lithotripsy; Pain management procedure (N/A, 1/5/2021); Pain management procedure (N/A, 3/26/2021); Pain management procedure (Bilateral, 5/3/2021); TURP (N/A, 5/26/2021); TURP (N/A, 11/10/2021); Pain management procedure (Bilateral, 4/26/2022); Pain management procedure (Bilateral, 7/5/2022); Pain management procedure (Bilateral, 11/28/2022); Pain management procedure (Bilateral, 8/19/2024); and Facet joint injection (Bilateral, 11/5/2024).    ALLERGIES   Allergies as of 11/19/2024    (No Known Allergies)       MEDICATIONS   Prior to Admission medications    Medication Sig Start Date End Date Taking? Authorizing Provider   cyclobenzaprine (FLEXERIL) 5 MG tablet TAKE 1 TABLET BY MOUTH THREE TIMES A DAY AS NEEDED FOR MUSCLE SPASM 2/11/25  Yes Konstantin Sherwood APRN - CNP   gabapentin (NEURONTIN) 300 MG capsule Take 1 capsule by mouth 3 times daily for 30 days. 2/10/25 3/12/25 Yes Konstantin Sherwood APRN - CNP   tamsulosin (FLOMAX) 0.4 MG capsule Take 1 capsule by mouth daily 1/7/25 10/4/25 Yes Yamilet Eckert PA-C   acetaminophen (TYLENOL) 500 MG tablet TAKE 2 TABLETS BY MOUTH 3 (THREE) TIMES A DAY FOR 7 DAYS. 2/19/24  Yes ProviderKennedi MD   rosuvastatin (CRESTOR) 20 MG tablet Take 1 tablet by mouth nightly   Yes ProviderKennedi MD   fenofibrate (TRICOR) 145 MG tablet Take 1 tablet by mouth daily

## 2025-02-24 RX ORDER — CYCLOBENZAPRINE HCL 5 MG
TABLET ORAL
Qty: 90 TABLET | Refills: 0 | OUTPATIENT
Start: 2025-02-24

## 2025-03-04 ENCOUNTER — OFFICE VISIT (OUTPATIENT)
Dept: PHYSICAL MEDICINE AND REHAB | Age: 55
End: 2025-03-04
Payer: MEDICAID

## 2025-03-04 VITALS
BODY MASS INDEX: 27.19 KG/M2 | SYSTOLIC BLOOD PRESSURE: 112 MMHG | WEIGHT: 194.22 LBS | DIASTOLIC BLOOD PRESSURE: 74 MMHG | HEIGHT: 71 IN

## 2025-03-04 DIAGNOSIS — G89.4 CHRONIC PAIN SYNDROME: ICD-10-CM

## 2025-03-04 DIAGNOSIS — M51.369 ANNULAR TEAR OF LUMBAR DISC: ICD-10-CM

## 2025-03-04 DIAGNOSIS — M47.816 LUMBAR FACET ARTHROPATHY: ICD-10-CM

## 2025-03-04 DIAGNOSIS — M54.42 CHRONIC MIDLINE LOW BACK PAIN WITH BILATERAL SCIATICA: ICD-10-CM

## 2025-03-04 DIAGNOSIS — M54.41 CHRONIC MIDLINE LOW BACK PAIN WITH BILATERAL SCIATICA: ICD-10-CM

## 2025-03-04 DIAGNOSIS — M47.816 LUMBAR SPONDYLOSIS: Primary | ICD-10-CM

## 2025-03-04 DIAGNOSIS — M47.812 CERVICAL SPONDYLOSIS: ICD-10-CM

## 2025-03-04 DIAGNOSIS — M79.18 CHRONIC MYOFASCIAL PAIN: ICD-10-CM

## 2025-03-04 DIAGNOSIS — G89.29 CHRONIC MIDLINE LOW BACK PAIN WITH BILATERAL SCIATICA: ICD-10-CM

## 2025-03-04 DIAGNOSIS — G89.29 CHRONIC MYOFASCIAL PAIN: ICD-10-CM

## 2025-03-04 DIAGNOSIS — M54.17 LUMBOSACRAL RADICULITIS: ICD-10-CM

## 2025-03-04 DIAGNOSIS — M54.2 NECK PAIN: ICD-10-CM

## 2025-03-04 DIAGNOSIS — M48.061 LUMBAR FORAMINAL STENOSIS: ICD-10-CM

## 2025-03-04 PROCEDURE — G8419 CALC BMI OUT NRM PARAM NOF/U: HCPCS | Performed by: NURSE PRACTITIONER

## 2025-03-04 PROCEDURE — 3078F DIAST BP <80 MM HG: CPT | Performed by: NURSE PRACTITIONER

## 2025-03-04 PROCEDURE — 3074F SYST BP LT 130 MM HG: CPT | Performed by: NURSE PRACTITIONER

## 2025-03-04 PROCEDURE — 99214 OFFICE O/P EST MOD 30 MIN: CPT | Performed by: NURSE PRACTITIONER

## 2025-03-04 PROCEDURE — 3017F COLORECTAL CA SCREEN DOC REV: CPT | Performed by: NURSE PRACTITIONER

## 2025-03-04 PROCEDURE — G8427 DOCREV CUR MEDS BY ELIG CLIN: HCPCS | Performed by: NURSE PRACTITIONER

## 2025-03-04 PROCEDURE — 1036F TOBACCO NON-USER: CPT | Performed by: NURSE PRACTITIONER

## 2025-03-04 RX ORDER — GABAPENTIN 300 MG/1
CAPSULE ORAL
Qty: 90 CAPSULE | Refills: 0 | Status: SHIPPED | OUTPATIENT
Start: 2025-03-12 | End: 2025-04-12

## 2025-03-04 RX ORDER — CYCLOBENZAPRINE HCL 5 MG
TABLET ORAL
Qty: 90 TABLET | Refills: 0 | Status: SHIPPED | OUTPATIENT
Start: 2025-03-13

## 2025-03-04 ASSESSMENT — ENCOUNTER SYMPTOMS
COUGH: 0
GASTROINTESTINAL NEGATIVE: 1
WHEEZING: 0
BACK PAIN: 1
RESPIRATORY NEGATIVE: 1
SHORTNESS OF BREATH: 0

## 2025-03-04 NOTE — PROGRESS NOTES
jaimie horses in the back of his calves- intermittently     Continues tylenol prn, Neurontin, flexeril prn and THC which helps.       Pain increases with bending, lifting, twisting , walking, standing, getting up and down, and housework or working at job.      Prior Injections:  bilateral TFLESI 11/28/2022 80% relief for about a year      Bilateral L-facet RFA @ L3-4 and L4-5 completed by Dr. Evans on 8/19/2024 80% relief      Bilateral C-facet MBB # 1 @ C5-6 and C6-7 completed by Dr. Subramanian on 11/5/2024 2 weeks of relief 80% relief for 1 week and 60% relief for the next week.      bilateral c-facet MBB # 2 @ C5-6 and C6-7 completed by Dr. Chin on 2/18/20205 Maybe pain 1 or 2 points less not significant relief.   Radiology:  Cervical xray:   FINDINGS:        The cervical vertebral bodies are normally aligned.  C1-T1 are seen.  There are no fractures.  There is mild cervical spondylosis at C3-4 and C6-7.. The facets and uncovertebral joints are normal. There is no prevertebral soft tissue swelling.   No   suspicious osseous lesions are present.  The lateral masses of C1 and the dens of C2 are normal.        IMPRESSION:     1. Mild cervical spondylosis at C3-4 and C6-7.  2. Otherwise negative cervical spine series.     Lumbar MRI:   FINDINGS:        The lumbar vertebral bodies are normally aligned.  There is normal marrow signal  throughout.  There is no bone marrow edema.  There are no compression fractures.   No pars defects are noted. There is stable isolated disc desiccation at the  L5-S1 level.     The distal spinal cord, conus medullaris and cauda equina are normal.      There are no gross abnormalities in the visualized aspects of the distal  thoracic spine.     On the axial images, at T12-L1 through L3-L4, there are no degenerative changes.  There is no spinal canal or foraminal stenosis.     At L4-L5, there are mild facet degenerative changes. The disc is normal. There  is no spinal canal stenosis.

## 2025-03-13 ENCOUNTER — OFFICE VISIT (OUTPATIENT)
Dept: UROLOGY | Age: 55
End: 2025-03-13
Payer: MEDICAID

## 2025-03-13 VITALS — HEIGHT: 71 IN | WEIGHT: 194 LBS | BODY MASS INDEX: 27.16 KG/M2 | RESPIRATION RATE: 12 BRPM

## 2025-03-13 DIAGNOSIS — Z12.5 SCREENING PSA (PROSTATE SPECIFIC ANTIGEN): Primary | ICD-10-CM

## 2025-03-13 DIAGNOSIS — N13.8 HYPERTROPHY OF PROSTATE WITH URINARY OBSTRUCTION: ICD-10-CM

## 2025-03-13 DIAGNOSIS — N32.81 OAB (OVERACTIVE BLADDER): ICD-10-CM

## 2025-03-13 DIAGNOSIS — N40.1 HYPERTROPHY OF PROSTATE WITH URINARY OBSTRUCTION: ICD-10-CM

## 2025-03-13 PROCEDURE — G8427 DOCREV CUR MEDS BY ELIG CLIN: HCPCS

## 2025-03-13 PROCEDURE — 1036F TOBACCO NON-USER: CPT

## 2025-03-13 PROCEDURE — G8419 CALC BMI OUT NRM PARAM NOF/U: HCPCS

## 2025-03-13 PROCEDURE — 3017F COLORECTAL CA SCREEN DOC REV: CPT

## 2025-03-13 PROCEDURE — 99214 OFFICE O/P EST MOD 30 MIN: CPT

## 2025-03-13 RX ORDER — SOLIFENACIN SUCCINATE 10 MG/1
10 TABLET, FILM COATED ORAL DAILY
Qty: 90 TABLET | Refills: 3 | Status: SHIPPED | OUTPATIENT
Start: 2025-03-13 | End: 2026-03-08

## 2025-03-13 RX ORDER — TAMSULOSIN HYDROCHLORIDE 0.4 MG/1
0.4 CAPSULE ORAL DAILY
Qty: 90 CAPSULE | Refills: 3 | Status: SHIPPED | OUTPATIENT
Start: 2025-03-13 | End: 2026-03-08

## 2025-03-13 ASSESSMENT — LIFESTYLE VARIABLES: HOW OFTEN DO YOU HAVE A DRINK CONTAINING ALCOHOL: NEVER

## 2025-03-13 NOTE — PATIENT INSTRUCTIONS
Continue Vesicare and Flomax   PSA in 1 year  Avoid bladder irritants  Call with questions, comments, or concerns. I recommend going to the ED for further evaluation if you develop fever, chills, nausea, vomiting, chest pain, SOB, or calf pain.    The medication list included in this document is our record of what you are currently taking, including any changes that were made at today's visit.  If you find any differences when compared to your medications at home, or have any questions that were not answered at your visit, please contact the office.           Bladder irritants    For some people, certain foods may irritate the bladder, causing or making bladder symptoms worse. If symptoms are related to diet, avoiding highly acidic or spicy foods, alcohol, or carbonated drinks should bring relief after approximately 10 days. Once the symptoms have improved on a restricted diet, patients can gradually add these foods back into the diet. If one specific food does cause symptoms, it can be easily identified and avoided.     Foods that should be avoided:       Apples     Plums  Apple juice    Strawberries  [place]    Tea  Carbonated beverages  Tomatoes  Chilies/spicy foods   Vinegar  Chocolate    Vitamin B complex  Citrus fruits    Bananas  Coffee (including decaffeinated) Saccharin sweetners (Sweet'n low)  Cranberries    Soy sauce  Grapes    Alcohol  Guava     Processed meat and fish  Nutrasweet    Tofu  Mayonnaise    Yogurt  Peaches    Pineapple

## 2025-03-13 NOTE — PROGRESS NOTES
[facility] [place] [facility]  [facility] UROLOGY  [address] [facility] [address]  Dept: [phone]  Loc: [phone]    Visit Date: 3/13/2025        HPI:     HPI  Mr. Costello is a 54-year-old male that presents in follow-up.     Failed Viagra and Cialis for management of ED.      He does endorse severe lower urinary tract symptoms secondary to prostatomegaly. C/o urinary urgency and urinary frequency. He ultimately underwent a cystoscopy, TURP in 5/2021. His post-operative course was complicated by the development of a bladder neck contracture. This was treated via a cystoscopy, TURP, transurethral incision of bladder neck contracture with Dr. Frost in 11/2021. OAB symptomatology persisted, though. Mr. Costello continues to take Flomax 0.4 mg and Vesicare 10 mg daily. This has reduced nocturia from 3-4x to 1 x per night. Gemtesa was cost prohibitive.     PSA of 1.28 in 9/2024. Previous PSA of 1.62 in 10/2020. Denies having a family hx of prostate cancer.     General medical nicole is remarkable for HTN, HLD, anxiety/depression. Hx of stroke. Social hx of smoking.     Current Outpatient Medications   Medication Sig Dispense Refill    cyclobenzaprine (FLEXERIL) 5 MG tablet TAKE 1 TABLET BY MOUTH THREE TIMES A DAY AS NEEDED FOR MUSCLE SPASM 90 tablet 0    gabapentin (NEURONTIN) 300 MG capsule Take 300 mg in am (1 capsule) and 600 mg at bedtime (2 capsules) 90 capsule 0    tamsulosin (FLOMAX) 0.4 MG capsule Take 1 capsule by mouth daily 90 capsule 2    acetaminophen (TYLENOL) 500 MG tablet TAKE 2 TABLETS BY MOUTH 3 (THREE) TIMES A DAY FOR 7 DAYS.      rosuvastatin (CRESTOR) 20 MG tablet Take 1 tablet by mouth nightly      fenofibrate (TRICOR) 145 MG tablet Take 1 tablet by mouth daily      vitamin D (ERGOCALCIFEROL) 1.25 MG (56511 UT) CAPS capsule Take 1 capsule by mouth Once a week at 5 PM      [employer] VITAMIN B12 1000 MCG TBCR Take 1 tablet by mouth daily      solifenacin (VESICARE) 10 MG tablet

## 2025-04-03 ENCOUNTER — OFFICE VISIT (OUTPATIENT)
Dept: NEUROLOGY | Age: 55
End: 2025-04-03
Payer: MEDICAID

## 2025-04-03 VITALS
BODY MASS INDEX: 26.74 KG/M2 | WEIGHT: 191 LBS | OXYGEN SATURATION: 97 % | SYSTOLIC BLOOD PRESSURE: 130 MMHG | HEART RATE: 67 BPM | HEIGHT: 71 IN | DIASTOLIC BLOOD PRESSURE: 72 MMHG

## 2025-04-03 DIAGNOSIS — F01.B0 MODERATE VASCULAR DEMENTIA, UNSPECIFIED WHETHER BEHAVIORAL, PSYCHOTIC, OR MOOD DISTURBANCE OR ANXIETY (HCC): Primary | ICD-10-CM

## 2025-04-03 DIAGNOSIS — Z87.891 EX-SMOKER: ICD-10-CM

## 2025-04-03 DIAGNOSIS — M47.816 LUMBAR SPONDYLOSIS: ICD-10-CM

## 2025-04-03 DIAGNOSIS — F01.50 VASCULAR DEMENTIA, UNSPECIFIED DEMENTIA SEVERITY, UNSPECIFIED WHETHER BEHAVIORAL, PSYCHOTIC, OR MOOD DISTURBANCE OR ANXIETY (HCC): ICD-10-CM

## 2025-04-03 DIAGNOSIS — M54.16 CHRONIC LUMBAR RADICULOPATHY: ICD-10-CM

## 2025-04-03 DIAGNOSIS — G31.9 CEREBRAL ATROPHY, MILD: ICD-10-CM

## 2025-04-03 DIAGNOSIS — M48.062 SPINAL STENOSIS OF LUMBAR REGION WITH NEUROGENIC CLAUDICATION: ICD-10-CM

## 2025-04-03 DIAGNOSIS — E78.2 MIXED HYPERLIPIDEMIA: ICD-10-CM

## 2025-04-03 DIAGNOSIS — Z86.73 H/O: STROKE: ICD-10-CM

## 2025-04-03 DIAGNOSIS — R26.9 GAIT DIFFICULTY: ICD-10-CM

## 2025-04-03 DIAGNOSIS — G47.9 SLEEP DIFFICULTIES: ICD-10-CM

## 2025-04-03 DIAGNOSIS — G60.8 POLYNEUROPATHY, PERIPHERAL SENSORIMOTOR AXONAL: ICD-10-CM

## 2025-04-03 DIAGNOSIS — I10 PRIMARY HYPERTENSION: ICD-10-CM

## 2025-04-03 DIAGNOSIS — H46.9 OPTIC NEURITIS, RIGHT: ICD-10-CM

## 2025-04-03 DIAGNOSIS — F41.9 ANXIETY AND DEPRESSION: ICD-10-CM

## 2025-04-03 DIAGNOSIS — R26.89 BALANCE PROBLEM: ICD-10-CM

## 2025-04-03 DIAGNOSIS — Z91.81 H/O FALLING: ICD-10-CM

## 2025-04-03 DIAGNOSIS — I67.82 CHRONIC CEREBRAL ISCHEMIA: ICD-10-CM

## 2025-04-03 DIAGNOSIS — R41.3 MEMORY LOSS: ICD-10-CM

## 2025-04-03 DIAGNOSIS — F32.A ANXIETY AND DEPRESSION: ICD-10-CM

## 2025-04-03 PROCEDURE — G8427 DOCREV CUR MEDS BY ELIG CLIN: HCPCS | Performed by: PSYCHIATRY & NEUROLOGY

## 2025-04-03 PROCEDURE — 3017F COLORECTAL CA SCREEN DOC REV: CPT | Performed by: PSYCHIATRY & NEUROLOGY

## 2025-04-03 PROCEDURE — 99214 OFFICE O/P EST MOD 30 MIN: CPT | Performed by: PSYCHIATRY & NEUROLOGY

## 2025-04-03 PROCEDURE — 1036F TOBACCO NON-USER: CPT | Performed by: PSYCHIATRY & NEUROLOGY

## 2025-04-03 PROCEDURE — G8419 CALC BMI OUT NRM PARAM NOF/U: HCPCS | Performed by: PSYCHIATRY & NEUROLOGY

## 2025-04-03 PROCEDURE — 99215 OFFICE O/P EST HI 40 MIN: CPT | Performed by: PSYCHIATRY & NEUROLOGY

## 2025-04-03 PROCEDURE — 3078F DIAST BP <80 MM HG: CPT | Performed by: PSYCHIATRY & NEUROLOGY

## 2025-04-03 PROCEDURE — 3075F SYST BP GE 130 - 139MM HG: CPT | Performed by: PSYCHIATRY & NEUROLOGY

## 2025-04-03 RX ORDER — DONEPEZIL HYDROCHLORIDE 10 MG/1
TABLET, FILM COATED ORAL
Qty: 30 TABLET | Refills: 2 | Status: SHIPPED | OUTPATIENT
Start: 2025-04-03

## 2025-04-03 ASSESSMENT — ENCOUNTER SYMPTOMS
CONSTIPATION: 0
FACIAL SWELLING: 0
EYE ITCHING: 0
DIARRHEA: 0
VISUAL CHANGE: 1
VOICE CHANGE: 0
CHEST TIGHTNESS: 0
ABDOMINAL DISTENTION: 0
EYE PAIN: 0
SINUS PRESSURE: 0
PHOTOPHOBIA: 0
EYE REDNESS: 0
CHOKING: 0
NAUSEA: 0
EYE DISCHARGE: 0
SHORTNESS OF BREATH: 0
APNEA: 0
BACK PAIN: 1
BLOOD IN STOOL: 0
COUGH: 0
TROUBLE SWALLOWING: 0
COLOR CHANGE: 0
ABDOMINAL PAIN: 0
WHEEZING: 0
SORE THROAT: 0
VOMITING: 0

## 2025-04-03 NOTE — PROGRESS NOTES
[facility]  Neurology    [address] [id number]  Phone:[phone]   Fax: [phone]        SUBJECTIVE:       PATIENT ID:  Vernon Costello is a  RIGHT     HANDED 54 y.o. male.      Neurologic Problem  The patient's primary symptoms include clumsiness, focal sensory loss, focal weakness, a loss of balance, memory loss, a visual change and weakness. The patient's pertinent negatives include no syncope. Primary symptoms comment: PROGRESSIVE    MEMORY PROBLEMS    FOR     5  YEARS;   LIGHT HEADEDNESS;    MEMORY  LOSS,   CHRONIC LUMBAR PAIN,   BALANCE PROBLEMS;    ANXIETY DEPRESSION     RIGHT  OPTIC  NEURITIS. This is a chronic problem. The neurological problem developed gradually. Associated symptoms include back pain, confusion, light-headedness and neck pain. Pertinent negatives include no abdominal pain, chest pain, dizziness, fatigue, fever, headaches, nausea, palpitations, shortness of breath or vomiting. Past treatments include bed rest and sleep. The treatment provided no relief. His past medical history is significant for a CVA, dementia and mood changes. There is no history of a bleeding disorder, a clotting disorder, head trauma, liver disease or seizures.             History obtained from  The   PATIENT    AND   HIS  GIRL  FRIEND  ( EX WIFE)       and other  available   medical  records   were  Also  reviewed.          The  Duration,  Quality,  Severity,  Location,  Timing,  Context,  Modifying  Factors   Of   The   Chief   Complaint       And  Present  Illness  Was   Reviewed   In   Chronological   [facility].                                            PATIENT'S  MAIN  CONCERNS INCLUDE :                     1)     H/O    CHRONIC    MEMORY PROBLEMS    AND  MEMORY LOSS      FOR   5   YEARS                             H/O    WORSE      SINCE    2023      CAUSING    DIFFICULTY  WITH  DAILY  ACTIVITIES                         2)        PATIENT  AND  HIS  GIRL  FRIEND

## 2025-04-14 RX ORDER — GABAPENTIN 300 MG/1
CAPSULE ORAL
Qty: 90 CAPSULE | Refills: 0 | Status: SHIPPED | OUTPATIENT
Start: 2025-04-14 | End: 2025-05-13

## 2025-04-14 RX ORDER — CYCLOBENZAPRINE HCL 5 MG
TABLET ORAL
Qty: 90 TABLET | Refills: 0 | Status: SHIPPED | OUTPATIENT
Start: 2025-04-14

## 2025-04-14 NOTE — TELEPHONE ENCOUNTER
OARRS reviewed. UDS: + for  THC. Historic drug screen from December 2024.   Last seen: 3/4/2025. Follow-up:   Future Appointments   Date Time Provider Department Center   6/4/2025 10:20 AM Konstantin Sherwood, APRN - CNP [facility] [place]   7/10/2025 11:45 AM Alejandro Lopez MD [facility]   3/12/2026 10:00 AM Yamilet Eckert PA-C N [facility]

## 2025-04-25 RX ORDER — DONEPEZIL HYDROCHLORIDE 10 MG/1
TABLET, FILM COATED ORAL
Qty: 90 TABLET | Refills: 1 | Status: SHIPPED | OUTPATIENT
Start: 2025-04-25

## 2025-05-12 RX ORDER — GABAPENTIN 300 MG/1
CAPSULE ORAL
Qty: 90 CAPSULE | Refills: 0 | Status: SHIPPED | OUTPATIENT
Start: 2025-05-14 | End: 2025-06-04 | Stop reason: SDUPTHER

## 2025-05-12 RX ORDER — CYCLOBENZAPRINE HCL 5 MG
TABLET ORAL
Qty: 90 TABLET | Refills: 0 | Status: SHIPPED | OUTPATIENT
Start: 2025-05-13 | End: 2025-06-04 | Stop reason: SDUPTHER

## 2025-06-04 ENCOUNTER — OFFICE VISIT (OUTPATIENT)
Dept: PHYSICAL MEDICINE AND REHAB | Age: 55
End: 2025-06-04
Payer: MEDICAID

## 2025-06-04 VITALS
HEIGHT: 71 IN | SYSTOLIC BLOOD PRESSURE: 108 MMHG | DIASTOLIC BLOOD PRESSURE: 86 MMHG | BODY MASS INDEX: 26.73 KG/M2 | WEIGHT: 190.92 LBS

## 2025-06-04 DIAGNOSIS — M47.816 LUMBAR FACET ARTHROPATHY: ICD-10-CM

## 2025-06-04 DIAGNOSIS — M54.2 NECK PAIN: ICD-10-CM

## 2025-06-04 DIAGNOSIS — M51.369 ANNULAR TEAR OF LUMBAR DISC: ICD-10-CM

## 2025-06-04 DIAGNOSIS — G89.4 CHRONIC PAIN SYNDROME: ICD-10-CM

## 2025-06-04 DIAGNOSIS — M54.17 LUMBOSACRAL RADICULITIS: ICD-10-CM

## 2025-06-04 DIAGNOSIS — M79.18 CHRONIC MYOFASCIAL PAIN: ICD-10-CM

## 2025-06-04 DIAGNOSIS — M48.061 LUMBAR FORAMINAL STENOSIS: ICD-10-CM

## 2025-06-04 DIAGNOSIS — M47.812 CERVICAL SPONDYLOSIS: ICD-10-CM

## 2025-06-04 DIAGNOSIS — M47.816 LUMBAR SPONDYLOSIS: Primary | ICD-10-CM

## 2025-06-04 DIAGNOSIS — G89.29 CHRONIC MIDLINE LOW BACK PAIN WITH BILATERAL SCIATICA: ICD-10-CM

## 2025-06-04 DIAGNOSIS — M54.41 CHRONIC MIDLINE LOW BACK PAIN WITH BILATERAL SCIATICA: ICD-10-CM

## 2025-06-04 DIAGNOSIS — G89.29 CHRONIC MYOFASCIAL PAIN: ICD-10-CM

## 2025-06-04 DIAGNOSIS — M54.42 CHRONIC MIDLINE LOW BACK PAIN WITH BILATERAL SCIATICA: ICD-10-CM

## 2025-06-04 PROCEDURE — 3017F COLORECTAL CA SCREEN DOC REV: CPT | Performed by: NURSE PRACTITIONER

## 2025-06-04 PROCEDURE — 1036F TOBACCO NON-USER: CPT | Performed by: NURSE PRACTITIONER

## 2025-06-04 PROCEDURE — 99214 OFFICE O/P EST MOD 30 MIN: CPT | Performed by: NURSE PRACTITIONER

## 2025-06-04 PROCEDURE — G8427 DOCREV CUR MEDS BY ELIG CLIN: HCPCS | Performed by: NURSE PRACTITIONER

## 2025-06-04 PROCEDURE — 3079F DIAST BP 80-89 MM HG: CPT | Performed by: NURSE PRACTITIONER

## 2025-06-04 PROCEDURE — 3074F SYST BP LT 130 MM HG: CPT | Performed by: NURSE PRACTITIONER

## 2025-06-04 PROCEDURE — G8419 CALC BMI OUT NRM PARAM NOF/U: HCPCS | Performed by: NURSE PRACTITIONER

## 2025-06-04 RX ORDER — GABAPENTIN 300 MG/1
CAPSULE ORAL
Qty: 270 CAPSULE | Refills: 0 | Status: SHIPPED | OUTPATIENT
Start: 2025-06-11 | End: 2025-09-11

## 2025-06-04 RX ORDER — CYCLOBENZAPRINE HCL 5 MG
5-10 TABLET ORAL 3 TIMES DAILY PRN
Qty: 180 TABLET | Refills: 0 | Status: SHIPPED | OUTPATIENT
Start: 2025-06-11

## 2025-06-04 ASSESSMENT — ENCOUNTER SYMPTOMS
COUGH: 0
GASTROINTESTINAL NEGATIVE: 1
SHORTNESS OF BREATH: 0
BACK PAIN: 1
RESPIRATORY NEGATIVE: 1
WHEEZING: 0

## 2025-06-04 NOTE — PROGRESS NOTES
1 CAPSULE IN THE MORNING 2 CAPSULES AT BEDTIME     Dispense:  270 capsule     Refill:  0    cyclobenzaprine (FLEXERIL) 5 MG tablet     Sig: Take 1-2 tablets by mouth 3 times daily as needed for Muscle spasms     Dispense:  180 tablet     Refill:  0       Return in about 3 months (around 9/4/2025), or if symptoms worsen or fail to improve, for follow up  for medications.               Electronically signed by ANGELINA Barboza CNP on6/4/2025 at 10:28 AM

## 2025-06-12 RX ORDER — CYCLOBENZAPRINE HCL 5 MG
TABLET ORAL
Qty: 90 TABLET | OUTPATIENT
Start: 2025-06-12

## 2025-07-07 RX ORDER — CYCLOBENZAPRINE HCL 5 MG
5-10 TABLET ORAL 3 TIMES DAILY PRN
Qty: 180 TABLET | Refills: 0 | Status: SHIPPED | OUTPATIENT
Start: 2025-07-11 | End: 2025-08-10

## 2025-07-10 ENCOUNTER — OFFICE VISIT (OUTPATIENT)
Dept: NEUROLOGY | Age: 55
End: 2025-07-10
Payer: MEDICAID

## 2025-07-10 VITALS
BODY MASS INDEX: 25.45 KG/M2 | WEIGHT: 182 LBS | OXYGEN SATURATION: 95 % | HEART RATE: 71 BPM | DIASTOLIC BLOOD PRESSURE: 66 MMHG | SYSTOLIC BLOOD PRESSURE: 102 MMHG

## 2025-07-10 DIAGNOSIS — G31.9 CEREBRAL ATROPHY, MILD: ICD-10-CM

## 2025-07-10 DIAGNOSIS — R41.3 MEMORY LOSS: ICD-10-CM

## 2025-07-10 DIAGNOSIS — H46.9 OPTIC NEURITIS, RIGHT: ICD-10-CM

## 2025-07-10 DIAGNOSIS — M54.16 CHRONIC LUMBAR RADICULOPATHY: ICD-10-CM

## 2025-07-10 DIAGNOSIS — I67.82 CHRONIC CEREBRAL ISCHEMIA: ICD-10-CM

## 2025-07-10 DIAGNOSIS — G60.8 POLYNEUROPATHY, PERIPHERAL SENSORIMOTOR AXONAL: ICD-10-CM

## 2025-07-10 DIAGNOSIS — F01.B0 MODERATE VASCULAR DEMENTIA, UNSPECIFIED WHETHER BEHAVIORAL, PSYCHOTIC, OR MOOD DISTURBANCE OR ANXIETY (HCC): Primary | ICD-10-CM

## 2025-07-10 DIAGNOSIS — Z91.81 H/O FALLING: ICD-10-CM

## 2025-07-10 DIAGNOSIS — R26.9 GAIT DIFFICULTY: ICD-10-CM

## 2025-07-10 DIAGNOSIS — F01.50 VASCULAR DEMENTIA, UNSPECIFIED DEMENTIA SEVERITY, UNSPECIFIED WHETHER BEHAVIORAL, PSYCHOTIC, OR MOOD DISTURBANCE OR ANXIETY (HCC): ICD-10-CM

## 2025-07-10 DIAGNOSIS — Z86.73 H/O: STROKE: ICD-10-CM

## 2025-07-10 DIAGNOSIS — I10 PRIMARY HYPERTENSION: ICD-10-CM

## 2025-07-10 DIAGNOSIS — F41.9 ANXIETY AND DEPRESSION: ICD-10-CM

## 2025-07-10 DIAGNOSIS — R26.89 BALANCE PROBLEM: ICD-10-CM

## 2025-07-10 DIAGNOSIS — F32.A ANXIETY AND DEPRESSION: ICD-10-CM

## 2025-07-10 DIAGNOSIS — Z87.891 EX-SMOKER: ICD-10-CM

## 2025-07-10 DIAGNOSIS — M48.062 SPINAL STENOSIS OF LUMBAR REGION WITH NEUROGENIC CLAUDICATION: ICD-10-CM

## 2025-07-10 PROCEDURE — 99214 OFFICE O/P EST MOD 30 MIN: CPT | Performed by: PSYCHIATRY & NEUROLOGY

## 2025-07-10 PROCEDURE — G8427 DOCREV CUR MEDS BY ELIG CLIN: HCPCS | Performed by: PSYCHIATRY & NEUROLOGY

## 2025-07-10 PROCEDURE — 3074F SYST BP LT 130 MM HG: CPT | Performed by: PSYCHIATRY & NEUROLOGY

## 2025-07-10 PROCEDURE — 3078F DIAST BP <80 MM HG: CPT | Performed by: PSYCHIATRY & NEUROLOGY

## 2025-07-10 PROCEDURE — 1036F TOBACCO NON-USER: CPT | Performed by: PSYCHIATRY & NEUROLOGY

## 2025-07-10 PROCEDURE — 3017F COLORECTAL CA SCREEN DOC REV: CPT | Performed by: PSYCHIATRY & NEUROLOGY

## 2025-07-10 PROCEDURE — G8419 CALC BMI OUT NRM PARAM NOF/U: HCPCS | Performed by: PSYCHIATRY & NEUROLOGY

## 2025-07-10 RX ORDER — ACETAMINOPHEN 160 MG
TABLET,DISINTEGRATING ORAL DAILY
COMMUNITY
Start: 2025-06-06

## 2025-07-10 RX ORDER — DONEPEZIL HYDROCHLORIDE 10 MG/1
TABLET, FILM COATED ORAL
Qty: 90 TABLET | Refills: 1 | Status: SHIPPED | OUTPATIENT
Start: 2025-07-10

## 2025-07-10 ASSESSMENT — ENCOUNTER SYMPTOMS
TROUBLE SWALLOWING: 0
CHEST TIGHTNESS: 0
SINUS PRESSURE: 0
APNEA: 0
VOICE CHANGE: 0
FACIAL SWELLING: 0
WHEEZING: 0
SHORTNESS OF BREATH: 0
CHOKING: 0

## 2025-07-10 NOTE — PROGRESS NOTES
the health of your family.  Follow-up careis a key part of your treatment and safety. Be sure to make and go to all appointments, and call your doctor if you are having problems. It’s also a good idea to know your test results and keep a list of the medicines you take.  How can you care for yourself at home?  Do not eat too much sugar, fat, or fast foods. You can still have dessert and treats nowand then. The goal is moderation.  Start small to improve your eating habits. Pay attention to portion sizes, drink less juice and soda pop, and eat more fruits and vegetables.  Eat a healthy amount of food. A 3-ounce serving of meat, for example, is about the size of a deck of cards. Fill the rest of your plate with vegetables and whole grains.  Limit theamount of soda and sports drinks you have every day. Drink more water when you are thirsty.  Eat at least 5 servings of fruits and vegetables every day. It may seem like a lot, but it is not hard to reach this goal. Aserving or helping is 1 piece of fruit, 1 cup of vegetables, or 2 cups of leafy, raw vegetables. Have an apple or some carrot sticks as an afternoon snack instead of a candy bar. Try to have fruits and/or vegetables at everymeal.  Make exercise part of your daily routine. You may want to start with simple activities, such as walking, bicycling, or slow swimming. Try alfreda active 30 to 60 minutes every day. You do not need to do all 30 to 60 minutes all at once. For example, you can exercise 3 times a day for 10 or 20 minutes. Moderate exercise is safe for most people, but it is always agood idea to talk to your doctor before starting an exercise program.  Keep moving. Mow the lawn, work in the garden, or clean your house. Take the stairs instead of the elevator at work.  If you smoke, quit. Peoplewho smoke have an increased risk for heart attack, stroke, cancer, and other lung illnesses. Quitting is hard, but there are ways to boost your chance of quitting

## 2025-07-17 RX ORDER — GABAPENTIN 300 MG/1
CAPSULE ORAL
Qty: 270 CAPSULE | Refills: 0 | Status: SHIPPED | OUTPATIENT
Start: 2025-07-17 | End: 2025-10-16

## 2025-08-05 RX ORDER — CYCLOBENZAPRINE HCL 5 MG
5-10 TABLET ORAL 3 TIMES DAILY PRN
Qty: 180 TABLET | Refills: 0 | Status: SHIPPED | OUTPATIENT
Start: 2025-08-10 | End: 2025-09-09

## 2025-09-04 ENCOUNTER — TELEPHONE (OUTPATIENT)
Dept: PHYSICAL MEDICINE AND REHAB | Age: 55
End: 2025-09-04

## 2025-09-04 ENCOUNTER — OFFICE VISIT (OUTPATIENT)
Dept: PHYSICAL MEDICINE AND REHAB | Age: 55
End: 2025-09-04
Payer: MEDICAID

## 2025-09-04 VITALS
HEIGHT: 71 IN | SYSTOLIC BLOOD PRESSURE: 124 MMHG | DIASTOLIC BLOOD PRESSURE: 82 MMHG | BODY MASS INDEX: 25.48 KG/M2 | WEIGHT: 182 LBS

## 2025-09-04 DIAGNOSIS — M47.812 CERVICAL SPONDYLOSIS: ICD-10-CM

## 2025-09-04 DIAGNOSIS — M47.816 LUMBAR FACET ARTHROPATHY: ICD-10-CM

## 2025-09-04 DIAGNOSIS — G89.29 CHRONIC MYOFASCIAL PAIN: ICD-10-CM

## 2025-09-04 DIAGNOSIS — M54.41 CHRONIC MIDLINE LOW BACK PAIN WITH BILATERAL SCIATICA: ICD-10-CM

## 2025-09-04 DIAGNOSIS — M48.061 LUMBAR FORAMINAL STENOSIS: ICD-10-CM

## 2025-09-04 DIAGNOSIS — G89.4 CHRONIC PAIN SYNDROME: ICD-10-CM

## 2025-09-04 DIAGNOSIS — M47.816 LUMBAR SPONDYLOSIS: Primary | ICD-10-CM

## 2025-09-04 DIAGNOSIS — M54.42 CHRONIC MIDLINE LOW BACK PAIN WITH BILATERAL SCIATICA: ICD-10-CM

## 2025-09-04 DIAGNOSIS — M54.17 LUMBOSACRAL RADICULITIS: ICD-10-CM

## 2025-09-04 DIAGNOSIS — M51.369 ANNULAR TEAR OF LUMBAR DISC: ICD-10-CM

## 2025-09-04 DIAGNOSIS — M79.18 CHRONIC MYOFASCIAL PAIN: ICD-10-CM

## 2025-09-04 DIAGNOSIS — G89.29 CHRONIC MIDLINE LOW BACK PAIN WITH BILATERAL SCIATICA: ICD-10-CM

## 2025-09-04 DIAGNOSIS — M54.2 NECK PAIN: ICD-10-CM

## 2025-09-04 PROCEDURE — 3017F COLORECTAL CA SCREEN DOC REV: CPT | Performed by: NURSE PRACTITIONER

## 2025-09-04 PROCEDURE — 1036F TOBACCO NON-USER: CPT | Performed by: NURSE PRACTITIONER

## 2025-09-04 PROCEDURE — 99214 OFFICE O/P EST MOD 30 MIN: CPT | Performed by: NURSE PRACTITIONER

## 2025-09-04 PROCEDURE — 3079F DIAST BP 80-89 MM HG: CPT | Performed by: NURSE PRACTITIONER

## 2025-09-04 PROCEDURE — 3074F SYST BP LT 130 MM HG: CPT | Performed by: NURSE PRACTITIONER

## 2025-09-04 PROCEDURE — G8427 DOCREV CUR MEDS BY ELIG CLIN: HCPCS | Performed by: NURSE PRACTITIONER

## 2025-09-04 PROCEDURE — G8419 CALC BMI OUT NRM PARAM NOF/U: HCPCS | Performed by: NURSE PRACTITIONER

## 2025-09-04 RX ORDER — BACLOFEN 10 MG/1
10 TABLET ORAL NIGHTLY
Qty: 14 TABLET | Refills: 0 | Status: SHIPPED | OUTPATIENT
Start: 2025-09-04 | End: 2025-09-18

## 2025-09-04 ASSESSMENT — ENCOUNTER SYMPTOMS
COUGH: 0
SHORTNESS OF BREATH: 0
WHEEZING: 0
BACK PAIN: 1
RESPIRATORY NEGATIVE: 1
GASTROINTESTINAL NEGATIVE: 1

## (undated) DEVICE — NEEDLE SYR 18GA L1.5IN RED PLAS HUB S STL BLNT FILL W/O

## (undated) DEVICE — 6 ML SYRINGE LUER-LOCK TIP: Brand: MONOJECT

## (undated) DEVICE — HYPODERMIC SAFETY NEEDLE: Brand: MAGELLAN

## (undated) DEVICE — CYSTO PACK: Brand: MEDLINE INDUSTRIES, INC.

## (undated) DEVICE — APPLICATOR MEDICATED 26 CC SOLUTION HI LT ORNG CHLORAPREP

## (undated) DEVICE — TOWEL,OR,DSP,ST,BLUE,STD,4/PK,20PK/CS: Brand: MEDLINE

## (undated) DEVICE — GAUZE SPONGES,USP TYPE VII GAUZE, 12 PLY: Brand: CURITY

## (undated) DEVICE — NEEDLE SYRINGE 18GA L1.5IN RED PLAS HUB S STL BLNT FILL W/O

## (undated) DEVICE — Device

## (undated) DEVICE — CATHETER URETH 22FR BAL 30CC PTFE FOL 2 STAGGERED EYE INDWL

## (undated) DEVICE — SHEET,DRAPE,3/4,53X77,STERILE: Brand: MEDLINE

## (undated) DEVICE — NEEDLE SPNL 22GA L3.5IN BLK HUB S STL REG WALL FIT STYL W/

## (undated) DEVICE — GLOVE ORANGE PI 8 1/2   MSG9085

## (undated) DEVICE — SYRINGE MEDICAL 3ML CLEAR PLASTIC STANDARD NON CONTROL LUERLOCK TIP DISPOSABLE

## (undated) DEVICE — HF-RESECTION ELECTRODE PLASMALOOP LOOP, MEDIUM, 24 FR., 12°-30°, ESG TURIS: Brand: OLYMPUS

## (undated) DEVICE — SYRINGE MED 10ML LUERLOCK TIP W/O SFTY DISP

## (undated) DEVICE — LASER SURG W22XH58IN D36IN 475LB SLD STATE FREQ DOUBLED

## (undated) DEVICE — DRAINBAG,ANTI-REFLUX TOWER,L/F,2000ML,LL: Brand: MEDLINE

## (undated) DEVICE — GLOVE ORANGE PI 7 1/2   MSG9075

## (undated) DEVICE — CHLORAPREP 26ML CLEAR

## (undated) DEVICE — STRAP,CATHETER,ELASTIC,HOOK&LOOP: Brand: MEDLINE

## (undated) DEVICE — SYRINGE, LUER LOCK, 60ML: Brand: MEDLINE

## (undated) DEVICE — SC PAIN PACK: Brand: MEDLINE INDUSTRIES, INC.

## (undated) DEVICE — NEEDLE SPNL 22GA L3.5IN BLK HUB S STL REG WALL FIT STYL

## (undated) DEVICE — 3 ML SYRINGE LUER-LOCK TIP: Brand: MONOJECT

## (undated) DEVICE — SOLUTION IV IRRIG WATER 1000ML POUR BRL 2F7114

## (undated) DEVICE — SYRINGE MED 5ML STD CLR PLAS LUERLOCK TIP N CTRL DISP

## (undated) DEVICE — SOLUTION IRRIG 3000ML 0.9% SOD CHL USP UROMATIC PLAS CONT

## (undated) DEVICE — BANDAGE ADH W1XL3IN NAT FAB WVN FLX DURABLE N ADH PD SEAL

## (undated) DEVICE — SYRINGE CATH TIP 50ML

## (undated) DEVICE — TRAY NRV BLK PARACERVICAL PUDEN W/ 10ML CTRL SYR